# Patient Record
Sex: MALE | Race: WHITE | NOT HISPANIC OR LATINO | Employment: OTHER | URBAN - METROPOLITAN AREA
[De-identification: names, ages, dates, MRNs, and addresses within clinical notes are randomized per-mention and may not be internally consistent; named-entity substitution may affect disease eponyms.]

---

## 2017-02-03 DIAGNOSIS — Z00.00 MEDICARE ANNUAL WELLNESS VISIT, SUBSEQUENT: ICD-10-CM

## 2017-02-03 DIAGNOSIS — Z00.00 ENCOUNTER FOR ROUTINE ADULT HEALTH EXAMINATION WITHOUT ABNORMAL FINDINGS: ICD-10-CM

## 2017-02-03 DIAGNOSIS — Z12.11 SCREEN FOR COLON CANCER: ICD-10-CM

## 2017-02-05 LAB — HEMOCCULT STL QL IA: NEGATIVE

## 2017-02-05 PROCEDURE — 82274 ASSAY TEST FOR BLOOD FECAL: CPT | Performed by: FAMILY MEDICINE

## 2017-02-20 ENCOUNTER — DOCUMENTATION ONLY (OUTPATIENT)
Dept: VASCULAR SURGERY | Facility: CLINIC | Age: 72
End: 2017-02-20

## 2017-07-17 ENCOUNTER — OFFICE VISIT (OUTPATIENT)
Dept: FAMILY MEDICINE | Facility: CLINIC | Age: 72
End: 2017-07-17
Payer: COMMERCIAL

## 2017-07-17 VITALS
DIASTOLIC BLOOD PRESSURE: 66 MMHG | BODY MASS INDEX: 25.61 KG/M2 | WEIGHT: 169 LBS | HEART RATE: 62 BPM | HEIGHT: 68 IN | SYSTOLIC BLOOD PRESSURE: 128 MMHG | TEMPERATURE: 97.8 F | OXYGEN SATURATION: 94 %

## 2017-07-17 DIAGNOSIS — Z12.11 SCREEN FOR COLON CANCER: ICD-10-CM

## 2017-07-17 DIAGNOSIS — K58.9 IRRITABLE BOWEL SYNDROME WITHOUT DIARRHEA: ICD-10-CM

## 2017-07-17 DIAGNOSIS — M51.369 LUMBAR DEGENERATIVE DISC DISEASE: ICD-10-CM

## 2017-07-17 DIAGNOSIS — K20.0 EE (EOSINOPHILIC ESOPHAGITIS): ICD-10-CM

## 2017-07-17 DIAGNOSIS — Z00.00 ENCOUNTER FOR ROUTINE ADULT HEALTH EXAMINATION WITHOUT ABNORMAL FINDINGS: Primary | ICD-10-CM

## 2017-07-17 DIAGNOSIS — J33.9 NASAL POLYP: ICD-10-CM

## 2017-07-17 DIAGNOSIS — E78.5 HYPERLIPIDEMIA LDL GOAL <130: ICD-10-CM

## 2017-07-17 DIAGNOSIS — M47.816 LUMBAR FACET ARTHROPATHY: ICD-10-CM

## 2017-07-17 DIAGNOSIS — Z51.81 MEDICATION MONITORING ENCOUNTER: ICD-10-CM

## 2017-07-17 DIAGNOSIS — I10 HYPERTENSION GOAL BP (BLOOD PRESSURE) < 140/90: ICD-10-CM

## 2017-07-17 DIAGNOSIS — M48.061 SPINAL STENOSIS OF LUMBAR REGION AT MULTIPLE LEVELS: ICD-10-CM

## 2017-07-17 DIAGNOSIS — M43.16 SPONDYLOLISTHESIS OF LUMBAR REGION: ICD-10-CM

## 2017-07-17 DIAGNOSIS — G47.33 OBSTRUCTIVE SLEEP APNEA SYNDROME: ICD-10-CM

## 2017-07-17 DIAGNOSIS — Z12.5 SCREENING FOR PROSTATE CANCER: ICD-10-CM

## 2017-07-17 DIAGNOSIS — M48.061 LUMBAR FORAMINAL STENOSIS: ICD-10-CM

## 2017-07-17 DIAGNOSIS — J45.20 MILD INTERMITTENT ASTHMA WITHOUT COMPLICATION: ICD-10-CM

## 2017-07-17 LAB
ALBUMIN UR-MCNC: NEGATIVE MG/DL
APPEARANCE UR: CLEAR
BILIRUB UR QL STRIP: NEGATIVE
COLOR UR AUTO: YELLOW
ERYTHROCYTE [DISTWIDTH] IN BLOOD BY AUTOMATED COUNT: 12.6 % (ref 10–15)
GLUCOSE UR STRIP-MCNC: NEGATIVE MG/DL
HCT VFR BLD AUTO: 44 % (ref 40–53)
HGB BLD-MCNC: 15.3 G/DL (ref 13.3–17.7)
HGB UR QL STRIP: NEGATIVE
KETONES UR STRIP-MCNC: 15 MG/DL
LEUKOCYTE ESTERASE UR QL STRIP: NEGATIVE
MCH RBC QN AUTO: 33.3 PG (ref 26.5–33)
MCHC RBC AUTO-ENTMCNC: 34.8 G/DL (ref 31.5–36.5)
MCV RBC AUTO: 96 FL (ref 78–100)
NITRATE UR QL: NEGATIVE
PH UR STRIP: 5.5 PH (ref 5–7)
PLATELET # BLD AUTO: 277 10E9/L (ref 150–450)
RBC # BLD AUTO: 4.6 10E12/L (ref 4.4–5.9)
SP GR UR STRIP: 1.02 (ref 1–1.03)
URN SPEC COLLECT METH UR: ABNORMAL
UROBILINOGEN UR STRIP-ACNC: 0.2 EU/DL (ref 0.2–1)
WBC # BLD AUTO: 7.9 10E9/L (ref 4–11)

## 2017-07-17 PROCEDURE — 80061 LIPID PANEL: CPT | Performed by: FAMILY MEDICINE

## 2017-07-17 PROCEDURE — 81003 URINALYSIS AUTO W/O SCOPE: CPT | Performed by: FAMILY MEDICINE

## 2017-07-17 PROCEDURE — 82550 ASSAY OF CK (CPK): CPT | Performed by: FAMILY MEDICINE

## 2017-07-17 PROCEDURE — 36415 COLL VENOUS BLD VENIPUNCTURE: CPT | Performed by: FAMILY MEDICINE

## 2017-07-17 PROCEDURE — 82043 UR ALBUMIN QUANTITATIVE: CPT | Performed by: FAMILY MEDICINE

## 2017-07-17 PROCEDURE — 99397 PER PM REEVAL EST PAT 65+ YR: CPT | Performed by: FAMILY MEDICINE

## 2017-07-17 PROCEDURE — G0103 PSA SCREENING: HCPCS | Performed by: FAMILY MEDICINE

## 2017-07-17 PROCEDURE — 85027 COMPLETE CBC AUTOMATED: CPT | Performed by: FAMILY MEDICINE

## 2017-07-17 PROCEDURE — 80053 COMPREHEN METABOLIC PANEL: CPT | Performed by: FAMILY MEDICINE

## 2017-07-17 RX ORDER — LOSARTAN POTASSIUM AND HYDROCHLOROTHIAZIDE 12.5; 5 MG/1; MG/1
1 TABLET ORAL DAILY
Qty: 90 TABLET | Refills: 3 | Status: SHIPPED | OUTPATIENT
Start: 2017-07-17 | End: 2018-08-03

## 2017-07-17 RX ORDER — FLUTICASONE PROPIONATE 50 MCG
2 SPRAY, SUSPENSION (ML) NASAL DAILY
Qty: 48 G | Refills: 3 | Status: SHIPPED | OUTPATIENT
Start: 2017-07-17 | End: 2018-08-03

## 2017-07-17 RX ORDER — ALBUTEROL SULFATE 90 UG/1
2 AEROSOL, METERED RESPIRATORY (INHALATION) EVERY 6 HOURS PRN
Qty: 3 INHALER | Refills: 3 | Status: SHIPPED | OUTPATIENT
Start: 2017-07-17 | End: 2018-08-03

## 2017-07-17 NOTE — MR AVS SNAPSHOT
After Visit Summary   7/17/2017    Alexander Alfred    MRN: [id number]           Patient Information     Date Of Birth          1945        Visit Information        Provider Department      7/17/2017 1:40 PM Messi Saldana MD [facility]        Today's Diagnoses     Encounter for routine adult health examination without abnormal findings    -  1    Irritable bowel syndrome with diarrhea        Hypertension goal BP (blood pressure) < 140/90        Hyperlipidemia LDL goal <130        Obstructive sleep apnea syndrome        Mild intermittent asthma without complication        Nasal polyps        EE (eosinophilic esophagitis)        Spinal stenosis of lumbar region at multiple levels        Lumbar foraminal stenosis        Spondylolisthesis of lumbar region        Lumbar degenerative disc disease        Lumbar facet arthropathy        Screening for prostate cancer        Screen for colon cancer        Medication monitoring encounter          Care Instructions      [facility]                        To reach your care team during and after hours:   [phone]  To reach our pharmacy:        [phone]    Clinic Hours                        Our clinic hours are:    Monday   7:30 am to 7:00 pm                  Tuesday through Friday 7:30 am to 5:00 pm                             Saturday   8:00 am to 12:00 pm      Sunday   Closed      Pharmacy Hours                        Our pharmacy hours are:    Monday   8:30 am to 7:00 pm       Tuesday to Friday  8:30 am to 6:00 pm                       Saturday    9:00 am to 1:00 pm              Sunday    Closed              There is also information available at our web site:  [email or web address]    If your provider ordered any lab tests and you do not receive the results within 10 business days, please call the clinic.    If you need a medication refill please contact your pharmacy.  Please allow 2-3 business days for your refill to be  completed.    Our clinic offers telephone visits and e visits.  Please ask one of your team members to explain more.      Use MyChart (secure email communication and access to your chart) to send your primary care provider a message or make an appointment. Ask someone on your Team how to sign up for [employer].  Immunizations                      Immunization History   Administered Date(s) Administered     Influenza (High Dose) 3 valent vaccine 10/31/2011, 01/08/2013, 09/24/2014, 12/03/2015, 10/11/2016     Influenza (IIV3) 11/01/2013     Pneumococcal (PCV 13) 06/15/2015     Pneumococcal 23 valent 10/31/2011     TD (ADULT, 7+) 01/01/2004     TDAP Vaccine (Boostrix) 06/21/2012     Zoster vaccine, live 12/03/2015        Health Maintenance                         Health Maintenance Due   Topic Date Due     Asthma Control Test - every 6 months  02/22/2017           Services Typically covered by Medicare Recommended Completed   Vaccines    Pneumonoccol    Influenza    Hepatitis B (if medium/high risk)     Once for patients after age 65    Yearly  Medium/high risk factors:    End Stage Kidney Disease    Hemophiliacs who received Factor XIII or IX concentrates    Clients of institutions for developmentally disabled    Persons who live in same house as a Hepatitis B carrier    Homosexual men    Illicit injectable drug users    Health care workers     Mammogram Covered: One-time screen between age 35-39, annually for age 40+     Pap and Pelvic Exam Covered: Annually if  high risk,  or childbearing age with abnormal Pap in last 3 years.  Q24 months for all other women     Prostate Cancer Screening    Digital rectal exam    PSA Covered: Annually for all men > age 50     Corolrectal Cancer Screening Screening colonoscopy every 10 years, more often for high risk patients     Diabetes Self-Management Training Requires referral by treating physician for patient with diabetes     Diabetes Screening    Fasting blood sugar or glucose  tolerance test   Once yearly, twice yearly if prediabetic     Cardiovascular Screening Blood Tests    Total Cholesterol    HDL    Triglycerides Every 5 years     Medical Nutrition Therapy for Diabetes or Renal Disease Requires referral by treating physician for patient with diabetes or kidney disease     Glaucoma Screening Annually for patients with one of the following risk factors:    Diabetes Mellitus    Family history of Glaucoma    -American age 50 and over    -American age 65 and over     Bone Mass Measurement Every 24 months if one of the following risk factors:    Estrogen deficiency    Vertebral abnormalities on x-ray indicative of Osteoporosis, Osteopenia, or Vertebral fracture    Receiving/expected to receive the equivalent of at least 5 mg of Prednisone per day for > 3 months    Hyperparathyroidism    Patient being monitored for response to Osteoporosis Therapy     One-time AAA screen  Must be ordered as part of Medicare IPPE   Any patient with a family history of AAA    Males Age 65-75, with history of smoking at least 100 cigarettes in lifetime     Smoking Cessation Counseling Beneficiaries who use tobacco are eligible to receive 2 cessation attempts per year; each attempt includes maximum of 4 sessions     HIV Screening Annually for beneficiaries at increased risk:       Increased risk for HIV infection is defined in the  National Coverage Determinations (NCD) Manual,  Publication 100-03 Sections 190.14 (diagnostic) and 210.7 (screening). See http://www.cms.gov/manuals/downloads/kmg832r2_Ruth6.pdf and http://www.cms.gov/manuals/downloads/vdp254q2_Yjyy0.pdf on the Internet.  Three times per pregnancy for beneficiaries who are pregnant.     Future Annual Wellness Visit Annually, for all beneficiaries.       Preventive Health Recommendations:       Male Ages 65 and over    Yearly exam:             See your health care provider every year in order to  o   Review health changes.   o    Discuss preventive care.    o   Review your medicines if your doctor has prescribed any.    Talk with your health care provider about whether you should have a test to screen for prostate cancer (PSA).    Every 3 years, have a diabetes test (fasting glucose). If you are at risk for diabetes, you should have this test more often.    Every 5 years, have a cholesterol test. Have this test more often if you are at risk for high cholesterol or heart disease.     Every 10 years, have a colonoscopy. Or, have a yearly FIT test (stool test). These exams will check for colon cancer.    Talk to with your health care provider about screening for Abdominal Aortic Aneurysm if you have a family history of AAA or have a history of smoking.  Shots:     Get a flu shot each year.     Get a tetanus shot every 10 years.     Talk to your doctor about your pneumonia vaccines. There are now two you should receive - Pneumovax (PPSV 23) and Prevnar (PCV 13).    Talk to your doctor about a shingles vaccine.     Talk to your doctor about the hepatitis B vaccine.  Nutrition:     Eat at least 5 servings of fruits and vegetables each day.     Eat whole-grain bread, whole-wheat pasta and brown rice instead of white grains and rice.     Talk to your doctor about Calcium and Vitamin D.   Lifestyle    Exercise for at least 150 minutes a week (30 minutes a day, 5 days a week). This will help you control your weight and prevent disease.     Limit alcohol to one drink per day.     No smoking.     Wear sunscreen to prevent skin cancer.     See your dentist every six months for an exam and cleaning.     See your eye doctor every 1 to 2 years to screen for conditions such as glaucoma, macular degeneration and cataracts.          Follow-ups after your visit        Future tests that were ordered for you today     Open Future Orders        Priority Expected Expires Ordered    Fecal colorectal cancer screen (FIT) Routine 8/7/2017 10/9/2017 7/17/2017           "  Who to contact     If you have questions or need follow up information about today's clinic visit or your schedule please contact [facility] directly at [phone].  Normal or non-critical lab and imaging results will be communicated to you by MyChart, letter or phone within 4 business days after the clinic has received the results. If you do not hear from us within 7 days, please contact the clinic through [employer] or phone. If you have a critical or abnormal lab result, we will notify you by phone as soon as possible.  Submit refill requests through [employer] or call your pharmacy and they will forward the refill request to us. Please allow 3 business days for your refill to be completed.          Additional Information About Your Visit        [employer] Information     [employer] gives you secure access to your electronic health record. If you see a primary care provider, you can also send messages to your care team and make appointments. If you have questions, please call your primary care clinic.  If you do not have a primary care provider, please call [phone] and they will assist you.        Care EveryWhere ID     This is your Care EveryWhere ID. This could be used by other organizations to access your [place] medical records  [id number]        Your Vitals Were     Pulse Temperature Height Pulse Oximetry BMI (Body Mass Index)       62 97.8  F (36.6  C) (Oral) 5' 8\" (1.727 m) 94% 25.7 kg/m2        Blood Pressure from Last 3 Encounters:   07/17/17 128/66   10/11/16 130/58   08/26/16 164/82    Weight from Last 3 Encounters:   07/17/17 169 lb (76.7 kg)   10/11/16 179 lb 12.8 oz (81.6 kg)   08/22/16 177 lb (80.3 kg)              We Performed the Following     *UA reflex to Microscopic and Culture ([facility] [facility] (except [place] and [place])     Albumin Random Urine Quantitative     CBC with platelets     CK total     Comprehensive metabolic panel     Lipid panel reflex to direct " LDL     Prostate spec antigen screen          Where to get your medicines      These medications [facility] to [id number] - [place], [place] - [address] [facility]  [address] [place] [address]     Phone:  [phone]     albuterol 108 (90 BASE) MCG/ACT Inhaler    fluticasone 50 MCG/ACT spray    losartan-hydrochlorothiazide 50-12.5 MG per tablet          Primary Care Provider Office Phone # Fax #    Messi Saldana [phone]       [facility] [address] [address]        Equal Access to Services     JOSUE PARIKH : Hadii aad ku hadasho Soomaali, waaxda luqadaha, qaybta kaalmada adeegyada, summer cornejo. So [facility] [phone].    ATENCIÓN: Si habla español, tiene a yarbrough disposición servicios gratuitos de asistencia lingüística. Llame al [phone].    We comply with applicable federal civil rights laws and [place] laws. We do not discriminate on the basis of race, color, national origin, age, disability sex, sexual orientation or gender identity.            Thank you!     Thank you for choosing [facility]  for your care. Our goal is always to provide you with excellent care. Hearing back from our patients is one way we can continue to improve our services. Please take a few minutes to complete the written survey that you may receive in the mail after your visit with us. Thank you!             Your Updated Medication List - Protect others around you: Learn how to safely use, store and throw away your medicines at www.disposemymeds.org.          This list is accurate as of: 7/17/17  3:02 PM.  Always use your most recent med list.                   Brand Name Dispense Instructions for use Diagnosis    albuterol 108 (90 BASE) MCG/ACT Inhaler    PROAIR HFA/PROVENTIL HFA/VENTOLIN HFA    3 Inhaler    Inhale 2 puffs into the lungs every 6 hours as needed for shortness of breath / dyspnea or  wheezing    Mild intermittent asthma without complication       aspirin 81 MG chewable tablet     108 tablet    Take 1 tablet (81 mg) by mouth daily        CoQ-10 100 MG Caps      Take 100 mg by mouth daily        fluticasone 50 MCG/ACT spray    FLONASE    48 g    Spray 2 sprays into both nostrils daily    Nasal polyp, Obstructive sleep apnea syndrome       KRILL OIL PO      Take 1 capsule by mouth daily Taking MegaRed OTC. Strength unknown.        losartan-hydrochlorothiazide 50-12.5 MG per tablet    HYZAAR    90 tablet    Take 1 tablet by mouth daily    Hypertension goal BP (blood pressure) < 140/90       meclizine 25 MG tablet    ANTIVERT    30 tablet    Take 1 tablet (25 mg) by mouth every 6 hours as needed for dizziness    Vertigo       MULTIVITAMIN PO      Take 1 tablet by mouth daily

## 2017-07-17 NOTE — NURSING NOTE
"Chief Complaint   Patient presents with     Wellness Visit       Initial /70 (BP Location: Left arm, Patient Position: Chair, Cuff Size: Adult Regular)  Pulse 62  Temp 97.8  F (36.6  C) (Oral)  Ht 5' 8\" (1.727 m)  Wt 169 lb (76.7 kg)  SpO2 94%  BMI 25.7 kg/m2 Estimated body mass index is 25.7 kg/(m^2) as calculated from the following:    Height as of this encounter: 5' 8\" (1.727 m).    Weight as of this encounter: 169 lb (76.7 kg).  Medication Reconciliation: complete   Angela Brewer SMA        "

## 2017-07-17 NOTE — LETTER
[facility]  [address] [place] [address]                  [phone]   July 19, 2017    Alexander Alfred  [address]  [place] [address]      Dear Alexander,    Here is a summary of your recent test results:    Labs are overall quite good, except for increased CK (muscles).     We advise:     Balanced low cholesterol diet and regular exercise.   Recheck CK (labs) in the next next after 1 day with out a work out.     For additional lab test information, [email or web address] is an excellent reference.     Your test results are enclosed.      Please contact me if you have any questions.             Thank you very much for trusting [facility]..     Healthy regards,        Messi Saldana M.D.        Results for orders placed or performed in visit on 07/17/17   Comprehensive metabolic panel   Result Value Ref Range    Sodium 141 133 - 144 mmol/L    Potassium 4.3 3.4 - 5.3 mmol/L    Chloride 106 94 - 109 mmol/L    Carbon Dioxide 26 20 - 32 mmol/L    Anion Gap 9 3 - 14 mmol/L    Glucose 94 70 - 99 mg/dL    Urea Nitrogen 21 7 - 30 mg/dL    Creatinine 1.09 0.66 - 1.25 mg/dL    GFR Estimate 67 >60 mL/min/1.7m2    GFR Estimate If Black 81 >60 mL/min/1.7m2    Calcium 9.5 8.5 - 10.1 mg/dL    Bilirubin Total 0.6 0.2 - 1.3 mg/dL    Albumin 4.3 3.4 - 5.0 g/dL    Protein Total 7.3 6.8 - 8.8 g/dL    Alkaline Phosphatase 90 40 - 150 U/L    ALT 47 0 - 70 U/L    AST 36 0 - 45 U/L   Lipid panel reflex to direct LDL   Result Value Ref Range    Cholesterol 185 <200 mg/dL    Triglycerides 133 <150 mg/dL    HDL Cholesterol 66 >39 mg/dL    LDL Cholesterol Calculated 92 <100 mg/dL    Non HDL Cholesterol 119 <130 mg/dL   CK total   Result Value Ref Range    CK Total 535 (H) 30 - 300 U/L   CBC with platelets   Result Value Ref Range    WBC 7.9 4.0 - 11.0 10e9/L    RBC Count 4.60 4.4 - 5.9 10e12/L    Hemoglobin 15.3 13.3 - 17.7 g/dL    Hematocrit 44.0 40.0 - 53.0 %    MCV 96 78 - 100  fl    MCH 33.3 (H) 26.5 - 33.0 pg    MCHC 34.8 31.5 - 36.5 g/dL    RDW 12.6 10.0 - 15.0 %    Platelet Count 277 150 - 450 10e9/L   Prostate spec antigen screen   Result Value Ref Range    PSA 0.64 0 - 4 ug/L   Albumin Random Urine Quantitative   Result Value Ref Range    Creatinine Urine 153 mg/dL    Albumin Urine mg/L 18 mg/L    Albumin Urine mg/g Cr 11.76 0 - 17 mg/g Cr   *UA reflex to Microscopic and Culture ([facility] (except [place] and [place])   Result Value Ref Range    Color Urine Yellow     Appearance Urine Clear     Glucose Urine Negative NEG mg/dL    Bilirubin Urine Negative NEG    Ketones Urine 15 (A) NEG mg/dL    Specific Gravity Urine 1.025 1.003 - 1.035    Blood Urine Negative NEG    pH Urine 5.5 5.0 - 7.0 pH    Protein Albumin Urine Negative NEG mg/dL    Urobilinogen Urine 0.2 0.2 - 1.0 EU/dL    Nitrite Urine Negative NEG    Leukocyte Esterase Urine Negative NEG    Source Midstream Urine

## 2017-07-17 NOTE — PATIENT INSTRUCTIONS
[facility]                        To reach your care team during and after hours:   [phone]  To reach our pharmacy:        [phone]    Clinic Hours                        Our clinic hours are:    Monday   7:30 am to 7:00 pm                  Tuesday through Friday 7:30 am to 5:00 pm                             Saturday   8:00 am to 12:00 pm      Sunday   Closed      Pharmacy Hours                        Our pharmacy hours are:    Monday   8:30 am to 7:00 pm       Tuesday to Friday  8:30 am to 6:00 pm                       Saturday    9:00 am to 1:00 pm              Sunday    Closed              There is also information available at our web site:  [email or web address]    If your provider ordered any lab tests and you do not receive the results within 10 business days, please call the clinic.    If you need a medication refill please contact your pharmacy.  Please allow 2-3 business days for your refill to be completed.    Our clinic offers telephone visits and e visits.  Please ask one of your team members to explain more.      Use [employer] (secure email communication and access to your chart) to send your primary care provider a message or make an appointment. Ask someone on your Team how to sign up for [employer].  Immunizations                      Immunization History   Administered Date(s) Administered     Influenza (High Dose) 3 valent vaccine 10/31/2011, 01/08/2013, 09/24/2014, 12/03/2015, 10/11/2016     Influenza (IIV3) 11/01/2013     Pneumococcal (PCV 13) 06/15/2015     Pneumococcal 23 valent 10/31/2011     TD (ADULT, 7+) 01/01/2004     TDAP Vaccine (Boostrix) 06/21/2012     Zoster vaccine, live 12/03/2015        Health Maintenance                         Health Maintenance Due   Topic Date Due     Asthma Control Test - every 6 months  02/22/2017           Services Typically covered by Medicare Recommended Completed   Vaccines    Pneumonoccol    Influenza    Hepatitis B (if medium/high  risk)     Once for patients after age 65    Yearly  Medium/high risk factors:    End Stage Kidney Disease    Hemophiliacs who received Factor XIII or IX concentrates    Clients of institutions for developmentally disabled    Persons who live in same house as a Hepatitis B carrier    Homosexual men    Illicit injectable drug users    Health care workers     Mammogram Covered: One-time screen between age 35-39, annually for age 40+     Pap and Pelvic Exam Covered: Annually if  high risk,  or childbearing age with abnormal Pap in last 3 years.  Q24 months for all other women     Prostate Cancer Screening    Digital rectal exam    PSA Covered: Annually for all men > age 50     Corolrectal Cancer Screening Screening colonoscopy every 10 years, more often for high risk patients     Diabetes Self-Management Training Requires referral by treating physician for patient with diabetes     Diabetes Screening    Fasting blood sugar or glucose tolerance test   Once yearly, twice yearly if prediabetic     Cardiovascular Screening Blood Tests    Total Cholesterol    HDL    Triglycerides Every 5 years     Medical Nutrition Therapy for Diabetes or Renal Disease Requires referral by treating physician for patient with diabetes or kidney disease     Glaucoma Screening Annually for patients with one of the following risk factors:    Diabetes Mellitus    Family history of Glaucoma    -American age 50 and over    -American age 65 and over     Bone Mass Measurement Every 24 months if one of the following risk factors:    Estrogen deficiency    Vertebral abnormalities on x-ray indicative of Osteoporosis, Osteopenia, or Vertebral fracture    Receiving/expected to receive the equivalent of at least 5 mg of Prednisone per day for > 3 months    Hyperparathyroidism    Patient being monitored for response to Osteoporosis Therapy     One-time AAA screen  Must be ordered as part of Medicare IPPE   Any patient with a family history of  AAA    Males Age 65-75, with history of smoking at least 100 cigarettes in lifetime     Smoking Cessation Counseling Beneficiaries who use tobacco are eligible to receive 2 cessation attempts per year; each attempt includes maximum of 4 sessions     HIV Screening Annually for beneficiaries at increased risk:       Increased risk for HIV infection is defined in the  National Coverage Determinations (NCD) Manual,  Publication 100-03 Sections 190.14 (diagnostic) and 210.7 (screening). See http://www.cms.gov/manuals/downloads/mcj739r6_Xeob1.pdf and http://www.cms.gov/manuals/downloads/wrj919w8_Zfxp8.pdf on the Internet.  Three times per pregnancy for beneficiaries who are pregnant.     Future Annual Wellness Visit Annually, for all beneficiaries.       Preventive Health Recommendations:       Male Ages 65 and over    Yearly exam:             See your health care provider every year in order to  o   Review health changes.   o   Discuss preventive care.    o   Review your medicines if your doctor has prescribed any.    Talk with your health care provider about whether you should have a test to screen for prostate cancer (PSA).    Every 3 years, have a diabetes test (fasting glucose). If you are at risk for diabetes, you should have this test more often.    Every 5 years, have a cholesterol test. Have this test more often if you are at risk for high cholesterol or heart disease.     Every 10 years, have a colonoscopy. Or, have a yearly FIT test (stool test). These exams will check for colon cancer.    Talk to with your health care provider about screening for Abdominal Aortic Aneurysm if you have a family history of AAA or have a history of smoking.  Shots:     Get a flu shot each year.     Get a tetanus shot every 10 years.     Talk to your doctor about your pneumonia vaccines. There are now two you should receive - Pneumovax (PPSV 23) and Prevnar (PCV 13).    Talk to your doctor about a shingles vaccine.     Talk to your  doctor about the hepatitis B vaccine.  Nutrition:     Eat at least 5 servings of fruits and vegetables each day.     Eat whole-grain bread, whole-wheat pasta and brown rice instead of white grains and rice.     Talk to your doctor about Calcium and Vitamin D.   Lifestyle    Exercise for at least 150 minutes a week (30 minutes a day, 5 days a week). This will help you control your weight and prevent disease.     Limit alcohol to one drink per day.     No smoking.     Wear sunscreen to prevent skin cancer.     See your dentist every six months for an exam and cleaning.     See your eye doctor every 1 to 2 years to screen for conditions such as glaucoma, macular degeneration and cataracts.

## 2017-07-18 LAB
ALBUMIN SERPL-MCNC: 4.3 G/DL (ref 3.4–5)
ALP SERPL-CCNC: 90 U/L (ref 40–150)
ALT SERPL W P-5'-P-CCNC: 47 U/L (ref 0–70)
ANION GAP SERPL CALCULATED.3IONS-SCNC: 9 MMOL/L (ref 3–14)
AST SERPL W P-5'-P-CCNC: 36 U/L (ref 0–45)
BILIRUB SERPL-MCNC: 0.6 MG/DL (ref 0.2–1.3)
BUN SERPL-MCNC: 21 MG/DL (ref 7–30)
CALCIUM SERPL-MCNC: 9.5 MG/DL (ref 8.5–10.1)
CHLORIDE SERPL-SCNC: 106 MMOL/L (ref 94–109)
CHOLEST SERPL-MCNC: 185 MG/DL
CK SERPL-CCNC: 535 U/L (ref 30–300)
CO2 SERPL-SCNC: 26 MMOL/L (ref 20–32)
CREAT SERPL-MCNC: 1.09 MG/DL (ref 0.66–1.25)
CREAT UR-MCNC: 153 MG/DL
GFR SERPL CREATININE-BSD FRML MDRD: 67 ML/MIN/1.7M2
GLUCOSE SERPL-MCNC: 94 MG/DL (ref 70–99)
HDLC SERPL-MCNC: 66 MG/DL
LDLC SERPL CALC-MCNC: 92 MG/DL
MICROALBUMIN UR-MCNC: 18 MG/L
MICROALBUMIN/CREAT UR: 11.76 MG/G CR (ref 0–17)
NONHDLC SERPL-MCNC: 119 MG/DL
POTASSIUM SERPL-SCNC: 4.3 MMOL/L (ref 3.4–5.3)
PROT SERPL-MCNC: 7.3 G/DL (ref 6.8–8.8)
PSA SERPL-ACNC: 0.64 UG/L (ref 0–4)
SODIUM SERPL-SCNC: 141 MMOL/L (ref 133–144)
TRIGL SERPL-MCNC: 133 MG/DL

## 2017-07-18 ASSESSMENT — ASTHMA QUESTIONNAIRES: ACT_TOTALSCORE: 25

## 2017-11-09 ENCOUNTER — ALLIED HEALTH/NURSE VISIT (OUTPATIENT)
Dept: NURSING | Facility: CLINIC | Age: 72
End: 2017-11-09
Payer: COMMERCIAL

## 2017-11-09 DIAGNOSIS — Z23 NEED FOR PROPHYLACTIC VACCINATION AND INOCULATION AGAINST INFLUENZA: Primary | ICD-10-CM

## 2017-11-09 PROCEDURE — 90662 IIV NO PRSV INCREASED AG IM: CPT

## 2017-11-09 PROCEDURE — G0008 ADMIN INFLUENZA VIRUS VAC: HCPCS

## 2017-11-09 NOTE — PROGRESS NOTES

## 2017-11-09 NOTE — MR AVS SNAPSHOT
After Visit Summary   11/9/2017    Alexander Alfred    MRN: [id number]           Patient Information     Date Of Birth          1945        Visit Information        Provider Department      11/9/2017 8:30 AM CLAUDIA BURNETT/LPN [facility]        Today's Diagnoses     Need for prophylactic vaccination and inoculation against influenza    -  1       Follow-ups after your visit        Who to contact     If you have questions or need follow up information about today's clinic visit or your schedule please contact [facility] directly at [phone].  Normal or non-critical lab and imaging results will be communicated to you by VideoNot.eshart, letter or phone within 4 business days after the clinic has received the results. If you do not hear from us within 7 days, please contact the clinic through Ziptrt or phone. If you have a critical or abnormal lab result, we will notify you by phone as soon as possible.  Submit refill requests through Career[employer] or call your pharmacy and they will forward the refill request to us. Please allow 3 business days for your refill to be completed.          Additional Information About Your Visit        MyChart Information     [employer] gives you secure access to your electronic health record. If you see a primary care provider, you can also send messages to your care team and make appointments. If you have questions, please call your primary care clinic.  If you do not have a primary care provider, please call [phone] and they will assist you.        Care EveryWhere ID     This is your Care EveryWhere ID. This could be used by other organizations to access your [place] medical records  [id number]         Blood Pressure from Last 3 Encounters:   07/17/17 128/66   10/11/16 130/58   08/26/16 164/82    Weight from Last 3 Encounters:   07/17/17 169 lb (76.7 kg)   10/11/16 179 lb 12.8 oz (81.6 kg)   08/22/16 177 lb (80.3 kg)              We Performed the  Following     ADMIN INFLUENZA (For MEDICARE Patients ONLY) []     FLU VACCINE, INCREASED ANTIGEN, PRESV FREE, AGE 65+ [02299]     Vaccine Administration, Initial [61871]        Primary Care Provider Office Phone # Fax #    Messi Saldana [phone]       [address] [facility] [address]        Equal Access to Services     [facility] : Hadii aad ku hadasho Soomaali, waaxda luqadaha, qaybta kaalmada adeegyada, waxay idiin hayaan adeeg khdavidmeir lanaman . So [facility] [phone].    ATENCIÓN: Si habla español, tiene a yarbrough disposición servicios gratuitos de asistencia lingüística. Priscillaame al [phone].    We comply with applicable federal civil rights laws and [place] laws. We do not discriminate on the basis of race, color, national origin, age, disability, sex, sexual orientation, or gender identity.            Thank you!     Thank you for choosing [facility]  for your care. Our goal is always to provide you with excellent care. Hearing back from our patients is one way we can continue to improve our services. Please take a few minutes to complete the written survey that you may receive in the mail after your visit with us. Thank you!             Your Updated Medication List - Protect others around you: Learn how to safely use, store and throw away your medicines at www.disposemymeds.org.          This list is accurate as of: 11/9/17  9:36 AM.  Always use your most recent med list.                   Brand Name Dispense Instructions for use Diagnosis    albuterol 108 (90 BASE) MCG/ACT Inhaler    PROAIR HFA/PROVENTIL HFA/VENTOLIN HFA    3 Inhaler    Inhale 2 puffs into the lungs every 6 hours as needed for shortness of breath / dyspnea or wheezing    Mild intermittent asthma without complication       aspirin 81 MG chewable tablet     108 tablet    Take 1 tablet (81 mg) by mouth daily        CoQ-10 100 MG Caps      Take 100 mg by mouth daily        fluticasone 50 MCG/ACT  spray    FLONASE    48 g    Spray 2 sprays into both nostrils daily    Nasal polyp, Obstructive sleep apnea syndrome       KRILL OIL PO      Take 1 capsule by mouth daily Taking MegaRed OTC. Strength unknown.        losartan-hydrochlorothiazide 50-12.5 MG per tablet    HYZAAR    90 tablet    Take 1 tablet by mouth daily    Hypertension goal BP (blood pressure) < 140/90       meclizine 25 MG tablet    ANTIVERT    30 tablet    Take 1 tablet (25 mg) by mouth every 6 hours as needed for dizziness    Vertigo       MULTIVITAMIN PO      Take 1 tablet by mouth daily

## 2017-12-06 ENCOUNTER — TRANSFERRED RECORDS (OUTPATIENT)
Dept: HEALTH INFORMATION MANAGEMENT | Facility: CLINIC | Age: 72
End: 2017-12-06

## 2017-12-12 ENCOUNTER — TELEPHONE (OUTPATIENT)
Dept: FAMILY MEDICINE | Facility: CLINIC | Age: 72
End: 2017-12-12

## 2017-12-12 DIAGNOSIS — G47.30 SLEEP APNEA: Primary | ICD-10-CM

## 2017-12-12 NOTE — TELEPHONE ENCOUNTER
Reason for Call:  Other DME CPAP mask    Detailed comments: Pt walked in and wanted to leave a note for Dr. Saldana for a new CPAP mask, Pt would like it sent to [facility]   [phone] Fax [phone]    Phone Number Patient can be reached at: Home number on file [phone] (home)    Best Time:     Can we leave a detailed message on this number? YES    Call taken on 12/12/2017 at 4:13 PM by Usha Albarran

## 2017-12-13 NOTE — TELEPHONE ENCOUNTER
DME for CPAP ordered.  Put in North basket for TS signature.    Radha Olguin, BELINDA, RN, N  [facility]) [phone]

## 2018-08-03 ENCOUNTER — OFFICE VISIT (OUTPATIENT)
Dept: FAMILY MEDICINE | Facility: CLINIC | Age: 73
End: 2018-08-03
Payer: COMMERCIAL

## 2018-08-03 VITALS
HEIGHT: 68 IN | DIASTOLIC BLOOD PRESSURE: 72 MMHG | TEMPERATURE: 97.6 F | OXYGEN SATURATION: 97 % | HEART RATE: 53 BPM | SYSTOLIC BLOOD PRESSURE: 148 MMHG | BODY MASS INDEX: 27.43 KG/M2 | WEIGHT: 181 LBS

## 2018-08-03 DIAGNOSIS — M48.061 SPINAL STENOSIS OF LUMBAR REGION AT MULTIPLE LEVELS: ICD-10-CM

## 2018-08-03 DIAGNOSIS — M48.061 LUMBAR FORAMINAL STENOSIS: ICD-10-CM

## 2018-08-03 DIAGNOSIS — J33.9 NASAL POLYP: ICD-10-CM

## 2018-08-03 DIAGNOSIS — Z51.81 MEDICATION MONITORING ENCOUNTER: ICD-10-CM

## 2018-08-03 DIAGNOSIS — E78.5 HYPERLIPIDEMIA LDL GOAL <130: ICD-10-CM

## 2018-08-03 DIAGNOSIS — M51.369 LUMBAR DEGENERATIVE DISC DISEASE: ICD-10-CM

## 2018-08-03 DIAGNOSIS — Z12.11 SCREEN FOR COLON CANCER: ICD-10-CM

## 2018-08-03 DIAGNOSIS — Z00.00 ENCOUNTER FOR ROUTINE ADULT HEALTH EXAMINATION WITHOUT ABNORMAL FINDINGS: Primary | ICD-10-CM

## 2018-08-03 DIAGNOSIS — K20.0 EE (EOSINOPHILIC ESOPHAGITIS): ICD-10-CM

## 2018-08-03 DIAGNOSIS — I10 HYPERTENSION GOAL BP (BLOOD PRESSURE) < 140/90: ICD-10-CM

## 2018-08-03 DIAGNOSIS — G47.33 OBSTRUCTIVE SLEEP APNEA SYNDROME: ICD-10-CM

## 2018-08-03 DIAGNOSIS — J45.20 MILD INTERMITTENT ASTHMA WITHOUT COMPLICATION: ICD-10-CM

## 2018-08-03 DIAGNOSIS — M43.10 ACQUIRED SPONDYLOLISTHESIS: ICD-10-CM

## 2018-08-03 DIAGNOSIS — Z12.5 SCREENING FOR PROSTATE CANCER: ICD-10-CM

## 2018-08-03 LAB
ALBUMIN SERPL-MCNC: 4.2 G/DL (ref 3.4–5)
ALBUMIN UR-MCNC: NEGATIVE MG/DL
ALP SERPL-CCNC: 76 U/L (ref 40–150)
ALT SERPL W P-5'-P-CCNC: 55 U/L (ref 0–70)
ANION GAP SERPL CALCULATED.3IONS-SCNC: 7 MMOL/L (ref 3–14)
APPEARANCE UR: CLEAR
AST SERPL W P-5'-P-CCNC: 38 U/L (ref 0–45)
BILIRUB SERPL-MCNC: 0.6 MG/DL (ref 0.2–1.3)
BILIRUB UR QL STRIP: NEGATIVE
BUN SERPL-MCNC: 19 MG/DL (ref 7–30)
CALCIUM SERPL-MCNC: 8.8 MG/DL (ref 8.5–10.1)
CHLORIDE SERPL-SCNC: 107 MMOL/L (ref 94–109)
CHOLEST SERPL-MCNC: 174 MG/DL
CK SERPL-CCNC: 551 U/L (ref 30–300)
CO2 SERPL-SCNC: 28 MMOL/L (ref 20–32)
COLOR UR AUTO: YELLOW
CREAT SERPL-MCNC: 1.05 MG/DL (ref 0.66–1.25)
CREAT UR-MCNC: 94 MG/DL
ERYTHROCYTE [DISTWIDTH] IN BLOOD BY AUTOMATED COUNT: 13 % (ref 10–15)
GFR SERPL CREATININE-BSD FRML MDRD: 69 ML/MIN/1.7M2
GLUCOSE SERPL-MCNC: 95 MG/DL (ref 70–99)
GLUCOSE UR STRIP-MCNC: NEGATIVE MG/DL
HCT VFR BLD AUTO: 45.7 % (ref 40–53)
HDLC SERPL-MCNC: 52 MG/DL
HGB BLD-MCNC: 15 G/DL (ref 13.3–17.7)
HGB UR QL STRIP: NEGATIVE
KETONES UR STRIP-MCNC: NEGATIVE MG/DL
LDLC SERPL CALC-MCNC: 89 MG/DL
LEUKOCYTE ESTERASE UR QL STRIP: NEGATIVE
MCH RBC QN AUTO: 32.3 PG (ref 26.5–33)
MCHC RBC AUTO-ENTMCNC: 32.8 G/DL (ref 31.5–36.5)
MCV RBC AUTO: 98 FL (ref 78–100)
MICROALBUMIN UR-MCNC: 5 MG/L
MICROALBUMIN/CREAT UR: 5.83 MG/G CR (ref 0–17)
NITRATE UR QL: NEGATIVE
NONHDLC SERPL-MCNC: 122 MG/DL
PH UR STRIP: 6 PH (ref 5–7)
PLATELET # BLD AUTO: 253 10E9/L (ref 150–450)
POTASSIUM SERPL-SCNC: 3.9 MMOL/L (ref 3.4–5.3)
PROT SERPL-MCNC: 7.5 G/DL (ref 6.8–8.8)
PSA SERPL-ACNC: 1.18 UG/L (ref 0–4)
RBC # BLD AUTO: 4.65 10E12/L (ref 4.4–5.9)
SODIUM SERPL-SCNC: 142 MMOL/L (ref 133–144)
SOURCE: NORMAL
SP GR UR STRIP: 1.02 (ref 1–1.03)
TRIGL SERPL-MCNC: 166 MG/DL
TSH SERPL DL<=0.005 MIU/L-ACNC: 1.78 MU/L (ref 0.4–4)
UROBILINOGEN UR STRIP-ACNC: 0.2 EU/DL (ref 0.2–1)
WBC # BLD AUTO: 6.6 10E9/L (ref 4–11)

## 2018-08-03 PROCEDURE — 80053 COMPREHEN METABOLIC PANEL: CPT | Performed by: FAMILY MEDICINE

## 2018-08-03 PROCEDURE — 99397 PER PM REEVAL EST PAT 65+ YR: CPT | Performed by: FAMILY MEDICINE

## 2018-08-03 PROCEDURE — 82043 UR ALBUMIN QUANTITATIVE: CPT | Performed by: FAMILY MEDICINE

## 2018-08-03 PROCEDURE — 80061 LIPID PANEL: CPT | Performed by: FAMILY MEDICINE

## 2018-08-03 PROCEDURE — 82550 ASSAY OF CK (CPK): CPT | Performed by: FAMILY MEDICINE

## 2018-08-03 PROCEDURE — 84443 ASSAY THYROID STIM HORMONE: CPT | Performed by: FAMILY MEDICINE

## 2018-08-03 PROCEDURE — G0103 PSA SCREENING: HCPCS | Performed by: FAMILY MEDICINE

## 2018-08-03 PROCEDURE — 81003 URINALYSIS AUTO W/O SCOPE: CPT | Performed by: FAMILY MEDICINE

## 2018-08-03 PROCEDURE — 85027 COMPLETE CBC AUTOMATED: CPT | Performed by: FAMILY MEDICINE

## 2018-08-03 PROCEDURE — 36415 COLL VENOUS BLD VENIPUNCTURE: CPT | Performed by: FAMILY MEDICINE

## 2018-08-03 RX ORDER — TEA TREE OIL 100 %
OIL (ML) TOPICAL
COMMUNITY
Start: 2018-08-03 | End: 2019-08-08

## 2018-08-03 RX ORDER — LOSARTAN POTASSIUM AND HYDROCHLOROTHIAZIDE 12.5; 5 MG/1; MG/1
1 TABLET ORAL DAILY
Qty: 90 TABLET | Refills: 3 | Status: SHIPPED | OUTPATIENT
Start: 2018-08-03 | End: 2019-01-07

## 2018-08-03 RX ORDER — ALBUTEROL SULFATE 90 UG/1
2 AEROSOL, METERED RESPIRATORY (INHALATION) EVERY 6 HOURS PRN
Qty: 3 INHALER | Refills: 3 | Status: SHIPPED | OUTPATIENT
Start: 2018-08-03 | End: 2019-08-08

## 2018-08-03 RX ORDER — CHOLECALCIFEROL (VITAMIN D3) 50 MCG
CAPSULE ORAL
COMMUNITY
Start: 2018-08-03 | End: 2023-08-30

## 2018-08-03 RX ORDER — FLUTICASONE PROPIONATE 50 MCG
2 SPRAY, SUSPENSION (ML) NASAL DAILY
Qty: 48 G | Refills: 3 | Status: SHIPPED | OUTPATIENT
Start: 2018-08-03 | End: 2019-01-07

## 2018-08-03 NOTE — PROGRESS NOTES
SUBJECTIVE:   Alexander Alfred is a 73 year old male who presents for Preventive Visit.    Are you in the first 12 months of your Medicare Part B coverage?  No    Healthy Habits:    Do you get at least three servings of calcium containing foods daily (dairy, green leafy vegetables, etc.)? yes    Amount of exercise or daily activities, outside of work: 3 day(s) per week    Problems taking medications regularly No    Medication side effects: No    Have you had an eye exam in the past two years? yes    Do you see a dentist twice per year? yes    Do you have sleep apnea, excessive snoring or daytime drowsiness?uses CPAP      Ability to successfully perform activities of daily living: Yes, no assistance needed    Home safety:  none identified     Hearing impairment: Yes, tinitis in Rt ear    Fall risk:    Fallen 2 or more times in the past year?: No  Any fall with injury in the past year?: No    COGNITIVE SCREEN  1) Repeat 3 items (Leader, Season, Table)    2) Clock draw: NORMAL  3) 3 item recall: Recalls 1 object   Results: NORMAL clock, 1-2 items recalled: COGNITIVE IMPAIRMENT LESS LIKELY    Mini-CogTM Copyright S Carlie. Licensed by the author for use in [facility]; reprinted with permission (jean paul@[facility]). All rights reserved.      Reviewed and updated as needed this visit by clinical staff  Tobacco  Allergies  Meds  Med Hx  Surg Hx  Fam Hx  Soc Hx        Reviewed and updated as needed this visit by Provider        Social History   Substance Use Topics     Smoking status: Never Smoker     Smokeless tobacco: Never Used     Alcohol use 7.0 oz/week     14 Standard drinks or equivalent per week      Comment: 2 drinks per day avg       If you drink alcohol do you typically have >3 drinks per day or >7 drinks per week? No                        Today's PHQ-2 Score:   PHQ-2 ( 1999 Pfizer) 8/3/2018 7/17/2017   Q1: Little interest or pleasure in doing things 0 0   Q2: Feeling down, depressed or hopeless 0 0    PHQ-2 Score 0 0     Asthma    No issues, albuterol 3-4 times, ACT = 25    EE    No Issues    Htn    BP Readings from Last 3 Encounters:   08/03/18 148/72   07/17/17 128/66   10/11/16 130/58     Lab Results   Component Value Date    CR 1.09 07/17/2017     Lipids    Recent Labs   Lab Test  07/17/17   1436  08/22/16   0939  06/15/15   1002  06/26/13   1041   CHOL  185  177  191  176   HDL  66  56  58  52   LDL  92  84  105  94   TRIG  133  185*  140  150   CHOLHDLRATIO   --    --   3.3  3.4     LBP - Dr Caraballo    No issues, 2 ibuprofen daily controls well, watching scoliosis, at 11 degrees    Sleep Apnea    Using CPAP, no issues    Nasal polyps    Controlled with flonase    Do you feel safe in your environment - Yes    Do you have a Health Care Directive?: Yes: Advance Directive has been received and scanned.    Current providers sharing in care for this patient include:   Patient Care Team:  Messi Saldana MD as PCP - General    The following health maintenance items are reviewed in Epic and correct as of today:  Health Maintenance   Topic Date Due     ASTHMA ACTION PLAN Q1 YR  08/22/2017     ASTHMA CONTROL TEST Q6 MOS  01/17/2018     FIT Q1 YR  02/05/2018     BMP Q1 YR  07/17/2018     LIPID MONITORING Q1 YEAR  07/17/2018     MICROALBUMIN Q1 YEAR  07/17/2018     PSA Q1 YR  07/17/2018     WELLNESS VISIT Q1 YR  07/17/2018     PHQ-2 Q1 YR  07/17/2018     FALL RISK ASSESSMENT  07/17/2018     INFLUENZA VACCINE (1) 09/01/2018     COLONOSCOPY Q10 YR  01/01/2020     TETANUS Q10 YR  06/21/2022     ADVANCE DIRECTIVE PLANNING Q5 YRS  08/03/2023     PNEUMOCOCCAL  Completed     AORTIC ANEURYSM SCREENING (SYSTEM ASSIGNED)  Completed     HEPATITIS C SCREENING  Completed     Health Maintenance     Colonoscopy:  Due 2020?   FIT:  given              PSA:  done   DEXA:  NA    Health Maintenance Due   Topic Date Due     ASTHMA ACTION PLAN Q1 YR  08/22/2017     ASTHMA CONTROL TEST Q6 MOS  01/17/2018     FIT Q1 YR  02/05/2018     BMP Q1  YR  07/17/2018     LIPID MONITORING Q1 YEAR  07/17/2018     MICROALBUMIN Q1 YEAR  07/17/2018     PSA Q1 YR  07/17/2018     WELLNESS VISIT Q1 YR  07/17/2018     PHQ-2 Q1 YR 07/17/2018     FALL RISK ASSESSMENT  07/17/2018       Current Problem List    Patient Active Problem List   Diagnosis     Nasal polyps     Asthma, mild intermittent     Sleep apnea     Hyperlipidemia LDL goal <130     Hypertension goal BP (blood pressure) < 140/90     EE (eosinophilic esophagitis)     Osteoarthritis of left hip     Lumbar facet arthropathy     Lumbar degenerative disc disease     Lumbar pain     Left lumbosacral radiculopathy     Spondylolisthesis of lumbar region     Lumbar foraminal stenosis     Central spinal stenosis     Spinal stenosis of lumbar region at multiple levels     Acquired spondylolisthesis     Closed traumatic nondisplaced fracture of distal end of left fibula     Left ankle pain     Ankle fracture     Advanced directives, counseling/discussion       Past Medical History    Past Medical History:   Diagnosis Date     Asthma, mild intermittent     [place] asthma     EE (eosinophilic esophagitis) 8/13    Dr Ramirez     Hyperlipidemia LDL goal <130      Hypertension goal BP (blood pressure) < 140/90      Nasal polyps 2004    approx 2/year - increased sx from nasal polyps - prednisone 40 mg x 2 days, 20 mg x 2 days, 10mg x 10 days with excellent results     Other chronic pain      Sleep apnea 2006    moderate - CPAP       Past Surgical History    Past Surgical History:   Procedure Laterality Date     COLONOSCOPY  2010    normal - due 2020     DECOMPRESSION, FUSION LUMBAR POSTERIOR THREE + LEVELS, COMBINED N/A 10/7/2014    Dr Knight - COMBINED DECOMPRESSION, FUSION LUMBAR POSTERIOR THREE + LEVELS     ESOPHAGOSCOPY, GASTROSCOPY, DUODENOSCOPY (EGD), COMBINED  8/7/2013    Dr Ramirez - EE, non erosive gastropathy     ROTATOR CUFF REPAIR RT/LT Left 2005    Lt shoulder - dr armaan lazar     ROTATOR CUFF REPAIR RT/LT  Left 05/2009    Lt shoulder - dr armaan lazar     stress echo  7/11    normal     STRESS ECHO ([facility])  6/15    normal stress echo     SURGICAL HISTORY OF -   1982    Lt ankle CRIF     SURGICAL HISTORY OF -   4/08    blephoplasty     SURGICAL HISTORY OF -   11/16    T12-L1 - Decompression - Dr Knight -      TONSILLECTOMY  1950       Current Medications    Current Outpatient Prescriptions   Medication Sig Dispense Refill     albuterol (PROAIR HFA/PROVENTIL HFA/VENTOLIN HFA) 108 (90 Base) MCG/ACT Inhaler Inhale 2 puffs into the lungs every 6 hours as needed for shortness of breath / dyspnea or wheezing 3 Inhaler 3     aspirin 81 MG chewable tablet Take 1 tablet (81 mg) by mouth daily 108 tablet 3     Bacillus Coagulans-Inulin (PROBIOTIC-PREBIOTIC) 1-250 BILLION-MG CAPS        Coenzyme Q10 (COQ-10) 100 MG CAPS Take 100 mg by mouth daily       fluticasone (FLONASE) 50 MCG/ACT spray Spray 2 sprays into both nostrils daily 48 g 3     KRILL OIL PO Take 1 capsule by mouth daily Taking MegaRed OTC. Strength unknown.       losartan-hydrochlorothiazide (HYZAAR) 50-12.5 MG per tablet Take 1 tablet by mouth daily 90 tablet 3     meclizine (ANTIVERT) 25 MG tablet Take 1 tablet (25 mg) by mouth every 6 hours as needed for dizziness 30 tablet 1     Multiple Vitamin (MULTIVITAMIN OR) Take 1 tablet by mouth daily        order for DME Equipment being ordered: CPAP mask 1 Device 0     Probiotic Product (4X PROBIOTIC) TABS        [DISCONTINUED] albuterol (PROAIR HFA/PROVENTIL HFA/VENTOLIN HFA) 108 (90 BASE) MCG/ACT Inhaler Inhale 2 puffs into the lungs every 6 hours as needed for shortness of breath / dyspnea or wheezing 3 Inhaler 3     [DISCONTINUED] losartan-hydrochlorothiazide (HYZAAR) 50-12.5 MG per tablet Take 1 tablet by mouth daily 90 tablet 3       Allergies    Allergies   Allergen Reactions     Codeine GI Disturbance     Hydrocodone GI Disturbance     Oxycodone GI Disturbance       Immunizations    Immunization History    Administered Date(s) Administered     Influenza (High Dose) 3 valent vaccine 10/31/2011, 01/08/2013, 09/24/2014, 12/03/2015, 10/11/2016, 11/09/2017     Influenza (IIV3) PF 11/01/2013     Pneumo Conj 13-V (2010&after) 06/15/2015     Pneumococcal 23 valent 10/31/2011     TD (ADULT, 7+) 01/01/2004     TDAP Vaccine (Boostrix) 06/21/2012     Zoster vaccine, live 12/03/2015       Family History    Family History   Problem Relation Age of Onset     HEART DISEASE Father      Hypertension Father      C.A.D. Father      smoker     C.A.ZOEY. Other      cousin - at 45     HEART DISEASE Maternal Grandfather      Hypertension Maternal Grandfather      C.A.ZOEY. Maternal Grandfather      Coronary Artery Disease Maternal Uncle      Coronary Artery Disease Maternal Uncle        Social History    Social History     Social History     Marital status:      Spouse name: Valeria     Number of children: 3     Years of education: 16     Occupational History      [employer]     Social History Main Topics     Smoking status: Never Smoker     Smokeless tobacco: Never Used     Alcohol use 7.0 oz/week     14 Standard drinks or equivalent per week      Comment: 2 drinks per day avg     Drug use: No     Sexual activity: Yes     Partners: Female     Birth control/ protection: Rhythm, Male Surgical     Other Topics Concern     Parent/Sibling W/ Cabg, Mi Or Angioplasty Before 65f 55m? 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, landscaping, Mtn Bike riding     Seat Belt Yes     Social History Narrative       ROS:  CONSTITUTIONAL: NEGATIVE for fever, chills, change in weight  INTEGUMENTARY/SKIN: NEGATIVE for worrisome rashes, moles or lesions  EYES: NEGATIVE for vision changes or irritation  ENT/MOUTH: NEGATIVE for ear, mouth and throat problems  RESP: NEGATIVE for significant cough or SOB  CV: NEGATIVE for chest pain, palpitations or peripheral edema  GI: NEGATIVE for nausea, abdominal pain, heartburn, or change in bowel  "habits  : NEGATIVE for frequency, dysuria, or hematuria  MUSCULOSKELETAL: NEGATIVE for significant arthralgias or myalgia  NEURO: NEGATIVE for weakness, dizziness or paresthesias  ENDOCRINE: NEGATIVE for temperature intolerance, skin/hair changes  HEME: NEGATIVE for bleeding problems  PSYCHIATRIC: NEGATIVE for changes in mood or affect    OBJECTIVE:   /72  Pulse 53  Temp 97.6  F (36.4  C) (Oral)  Ht 5' 8\" (1.727 m)  Wt 181 lb (82.1 kg)  SpO2 97%  BMI 27.52 kg/m2 Estimated body mass index is 27.52 kg/(m^2) as calculated from the following:    Height as of this encounter: 5' 8\" (1.727 m).    Weight as of this encounter: 181 lb (82.1 kg).  EXAM:   GENERAL: healthy, alert and no distress  EYES: Eyes grossly normal to inspection, PERRL and conjunctivae and sclerae normal  HENT: ear canals and TM's normal, nose and mouth without ulcers or lesions  NECK: no adenopathy, no asymmetry, masses, or scars and thyroid normal to palpation  RESP: lungs clear to auscultation - no rales, rhonchi or wheezes  CV: regular rate and rhythm, normal S1 S2, no S3 or S4, no murmur, click or rub, no peripheral edema and peripheral pulses strong  ABDOMEN: soft, nontender, no hepatosplenomegaly, no masses and bowel sounds normal   (male): normal male genitalia without lesions or urethral discharge, no hernia  RECTAL: normal sphincter tone, no rectal masses, prostate normal size, smooth, nontender without nodules or masses  MS: no gross musculoskeletal defects noted, no edema  SKIN: no suspicious lesions or rashes  NEURO: Normal strength and tone, mentation intact and speech normal  PSYCH: mentation appears normal, affect normal/bright  LYMPH: no cervical, supraclavicular, axillary, or inguinal adenopathy    Diagnostic Test Results:    Labs pending    ASSESSMENT / PLAN:       ICD-10-CM    1. Encounter for routine adult health examination without abnormal findings Z00.00 Comprehensive metabolic panel     Lipid panel reflex to " direct LDL Fasting     CK total     CBC with platelets     TSH with free T4 reflex     Prostate spec antigen screen     Albumin Random Urine Quantitative with Creat Ratio     *UA reflex to Microscopic and Culture ([facility] (except [place] and [place])     Fecal colorectal cancer screen (FIT)   2. Hypertension goal BP (blood pressure) < 140/90 I10 Comprehensive metabolic panel     Albumin Random Urine Quantitative with Creat Ratio     *UA reflex to Microscopic and Culture ([facility] (except [place] and [place])     losartan-hydrochlorothiazide (HYZAAR) 50-12.5 MG per tablet   3. Hyperlipidemia LDL goal <130 E78.5 Comprehensive metabolic panel     Lipid panel reflex to direct LDL Fasting     CK total   4. Obstructive sleep apnea syndrome G47.33 TSH with free T4 reflex     fluticasone (FLONASE) 50 MCG/ACT spray   5. Mild intermittent asthma without complication J45.20 albuterol (PROAIR HFA/PROVENTIL HFA/VENTOLIN HFA) 108 (90 Base) MCG/ACT Inhaler   6. Nasal polyps J33.9 fluticasone (FLONASE) 50 MCG/ACT spray   7. EE (eosinophilic esophagitis) K20.0 Comprehensive metabolic panel     CBC with platelets   8. Lumbar degenerative disc disease M51.36    9. Lumbar foraminal stenosis M99.83    10. Spinal stenosis of lumbar region at multiple levels M48.061    11. Acquired spondylolisthesis M43.10    12. Screen for colon cancer Z12.11 Fecal colorectal cancer screen (FIT)   13. Screening for prostate cancer Z12.5 Prostate spec antigen screen   14. Medication monitoring encounter Z51.81 Comprehensive metabolic panel     Lipid panel reflex to direct LDL Fasting     CK total     CBC with platelets     TSH with free T4 reflex     Albumin Random Urine Quantitative with Creat Ratio     *UA reflex to Microscopic and Culture ([facility] (except [place] and [place])     Discussed treatment/modality options, including risk and benefits, he desires advised alcohol consumption  "1oz per day or less, advised aspirin 81 mg po daily, advised 1 multivitamin per day, advised calcium 9136-8700 mg/d and Vitamin D 800-1200 IU/d, advised dentist every 6 months, advised diet and exercise, advised opthalmologist every 1-2 years, advised self testicular exam q month, further health care maintenance, further lab(s) and observation. All diagnosis above reviewed and noted above, otherwise stable.  See [facility] orders for further details.      Return in about 1 year (around 8/3/2019), or if symptoms worsen or fail to improve, for Physical Exam.    Health Maintenance Due   Topic Date Due     ASTHMA ACTION PLAN Q1 YR  08/22/2017     ASTHMA CONTROL TEST Q6 MOS  01/17/2018     FIT Q1 YR  02/05/2018     BMP Q1 YR  07/17/2018     LIPID MONITORING Q1 YEAR  07/17/2018     MICROALBUMIN Q1 YEAR  07/17/2018     PSA Q1 YR  07/17/2018     WELLNESS VISIT Q1 YR  07/17/2018     PHQ-2 Q1 YR  07/17/2018     FALL RISK ASSESSMENT  07/17/2018       End of Life Planning:  Patient currently has an advanced directive: pending    COUNSELING:  Reviewed preventive health counseling, as reflected in patient instructions       Regular exercise       Healthy diet/nutrition       Vision screening       Hearing screening       Dental care       Colon cancer screening       Prostate cancer screening       Osteoporosis Prevention/Bone Health    BP Readings from Last 1 Encounters:   08/03/18 148/72     Estimated body mass index is 27.52 kg/(m^2) as calculated from the following:    Height as of this encounter: 5' 8\" (1.727 m).    Weight as of this encounter: 181 lb (82.1 kg).           reports that he has never smoked. He has never used smokeless tobacco.      Appropriate preventive services were discussed with this patient, including applicable screening as appropriate for cardiovascular disease, diabetes, osteopenia/osteoporosis, and glaucoma.  As appropriate for age/gender, discussed screening for colorectal cancer, prostate cancer, " breast cancer, and cervical cancer. Checklist reviewing preventive services available has been given to the patient.    Reviewed patients plan of care and provided an AVS. The Intermediate Care Plan ( asthma action plan, low back pain action plan, and migraine action plan) for Alexander meets the Care Plan requirement. This Care Plan has been established and reviewed with the Patient.    Counseling Resources:  ATP IV Guidelines  Pooled Cohorts Equation Calculator  Breast Cancer Risk Calculator  FRAX Risk Assessment  ICSI Preventive Guidelines  Dietary Guidelines for Americans, 2010  USDA's MyPlate  ASA Prophylaxis  Lung CA Screening    Messi Saldana MD  [facility]

## 2018-08-03 NOTE — MR AVS SNAPSHOT
After Visit Summary   8/3/2018    Alexander Alfred    MRN: [id number]           Patient Information     Date Of Birth          1945        Visit Information        Provider Department      8/3/2018 8:40 AM Messi Saldana MD [facility]        Today's Diagnoses     Encounter for routine adult health examination without abnormal findings    -  1    Hypertension goal BP (blood pressure) < 140/90        Hyperlipidemia LDL goal <130        Obstructive sleep apnea syndrome        Mild intermittent asthma without complication        EE (eosinophilic esophagitis)        Lumbar degenerative disc disease        Lumbar foraminal stenosis        Spinal stenosis of lumbar region at multiple levels        Acquired spondylolisthesis        Screen for colon cancer        Screening for prostate cancer        Medication monitoring encounter          Care Instructions    [facility]                        To reach your care team during and after hours:   [phone]  To reach our pharmacy:        [phone]    Clinic Hours                        Our clinic hours are:    Monday   7:30 am to 7:00 pm                  Tuesday through Friday 7:30 am to 5:00 pm                             Saturday   8:00 am to 12:00 pm      Sunday   Closed      Pharmacy Hours                        Our pharmacy hours are:    Monday   8:30 am to 7:00 pm       Tuesday to Friday  8:30 am to 6:00 pm                       Saturday    9:00 am to 1:00 pm              Sunday    Closed              There is also information available at our web site:  [email or web address]    If your provider ordered any lab tests and you do not receive the results within 10 business days, please call the clinic.    If you need a medication refill please contact your pharmacy.  Please allow 2-3 business days for your refill to be completed.    Our clinic offers telephone visits and e visits.  Please ask one of your team  members to explain more.      Use [employer] (secure email communication and access to your chart) to send your primary care provider a message or make an appointment. Ask someone on your Team how to sign up for Boast[employer].  Immunizations                      Immunization History   Administered Date(s) Administered     Influenza (High Dose) 3 valent vaccine 10/31/2011, 01/08/2013, 09/24/2014, 12/03/2015, 10/11/2016, 11/09/2017     Influenza (IIV3) PF 11/01/2013     Pneumo Conj 13-V (2010&after) 06/15/2015     Pneumococcal 23 valent 10/31/2011     TD (ADULT, 7+) 01/01/2004     TDAP Vaccine (Boostrix) 06/21/2012     Zoster vaccine, live 12/03/2015        Health Maintenance                         Health Maintenance Due   Topic Date Due     Asthma Action Plan - yearly  08/22/2017     Asthma Control Test - every 6 months  01/17/2018     Colon Cancer Screening - FIT Test - yearly  02/05/2018     Basic Metabolic Lab - yearly  07/17/2018     Cholesterol Lab - yearly  07/17/2018     Microalbumin Lab - yearly  07/17/2018     Prostate Test (PSA) - yearly  07/17/2018     Wellness Visit with your Primary Provider - yearly  07/17/2018     Depression Assessment 2 - yearly  07/17/2018     FALL RISK ASSESSMENT  07/17/2018           Preventive Health Recommendations:       Male Ages 65 and over    Yearly exam:             See your health care provider every year in order to  o   Review health changes.   o   Discuss preventive care.    o   Review your medicines if your doctor has prescribed any.    Talk with your health care provider about whether you should have a test to screen for prostate cancer (PSA).    Every 3 years, have a diabetes test (fasting glucose). If you are at risk for diabetes, you should have this test more often.    Every 5 years, have a cholesterol test. Have this test more often if you are at risk for high cholesterol or heart disease.     Every 10 years, have a colonoscopy. Or, have a yearly FIT test (stool test).  These exams will check for colon cancer.    Talk to with your health care provider about screening for Abdominal Aortic Aneurysm if you have a family history of AAA or have a history of smoking.  Shots:     Get a flu shot each year.     Get a tetanus shot every 10 years.     Talk to your doctor about your pneumonia vaccines. There are now two you should receive - Pneumovax (PPSV 23) and Prevnar (PCV 13).    Talk to your pharmacist about a shingles vaccine.     Talk to your doctor about the hepatitis B vaccine.  Nutrition:     Eat at least 5 servings of fruits and vegetables each day.     Eat whole-grain bread, whole-wheat pasta and brown rice instead of white grains and rice.     Get adequate Calcium and Vitamin D.   Lifestyle    Exercise for at least 150 minutes a week (30 minutes a day, 5 days a week). This will help you control your weight and prevent disease.     Limit alcohol to one drink per day.     No smoking.     Wear sunscreen to prevent skin cancer.     See your dentist every six months for an exam and cleaning.     See your eye doctor every 1 to 2 years to screen for conditions such as glaucoma, macular degeneration and cataracts.          Follow-ups after your visit        Future tests that were ordered for you today     Open Future Orders        Priority Expected Expires Ordered    Fecal colorectal cancer screen (FIT) Routine 8/24/2018 10/26/2018 8/3/2018            Who to contact     If you have questions or need follow up information about today's clinic visit or your schedule please contact [facility] directly at [phone].  Normal or non-critical lab and imaging results will be communicated to you by MyChart, letter or phone within 4 business days after the clinic has received the results. If you do not hear from us within 7 days, please contact the clinic through MyChart or phone. If you have a critical or abnormal lab result, we will notify you by phone as soon as  "possible.  Submit refill requests through Wote or call your pharmacy and they will forward the refill request to us. Please allow 3 business days for your refill to be completed.          Additional Information About Your Visit        [employer] Information     Wote gives you secure access to your electronic health record. If you see a primary care provider, you can also send messages to your care team and make appointments. If you have questions, please call your primary care clinic.  If you do not have a primary care provider, please call [phone] and they will assist you.        Care EveryWhere ID     This is your Care EveryWhere ID. This could be used by other organizations to access your [place] medical records  [id number]        Your Vitals Were     Pulse Temperature Height Pulse Oximetry BMI (Body Mass Index)       53 97.6  F (36.4  C) (Oral) 5' 8\" (1.727 m) 97% 27.52 kg/m2        Blood Pressure from Last 3 Encounters:   08/03/18 148/72   07/17/17 128/66   10/11/16 130/58    Weight from Last 3 Encounters:   08/03/18 181 lb (82.1 kg)   07/17/17 169 lb (76.7 kg)   10/11/16 179 lb 12.8 oz (81.6 kg)              We Performed the Following     *UA reflex to Microscopic and Culture ([place] and [facility] (except [place] and [place])     Albumin Random Urine Quantitative with Creat Ratio     CBC with platelets     CK total     Comprehensive metabolic panel     Lipid panel reflex to direct LDL Fasting     Prostate spec antigen screen     TSH with free T4 reflex        Primary Care Provider Office Phone # Fax #    Messi Saldana [phone]       [address] [place] [id number]        Equal Access to Services     [facility]: Hadii sally Cottrell, waaxda luqadaha, qaybta kaalmada summer jonas. So [facility] [phone].    ATENCIÓN: Si habla español, tiene a yarbrough disposición servicios gratuitos de asistencia lingüística. Llame al " [phone].    We comply with applicable federal civil rights laws and [place] laws. We do not discriminate on the basis of race, color, national origin, age, disability, sex, sexual orientation, or gender identity.            Thank you!     Thank you for choosing [facility]  for your care. Our goal is always to provide you with excellent care. Hearing back from our patients is one way we can continue to improve our services. Please take a few minutes to complete the written survey that you may receive in the mail after your visit with us. Thank you!             Your Updated Medication List - Protect others around you: Learn how to safely use, store and throw away your medicines at www.disposemymeds.org.          This list is accurate as of 8/3/18  9:25 AM.  Always use your most recent med list.                   Brand Name Dispense Instructions for use Diagnosis    4X PROBIOTIC Tabs           albuterol 108 (90 Base) MCG/ACT Inhaler    PROAIR HFA/PROVENTIL HFA/VENTOLIN HFA    3 Inhaler    Inhale 2 puffs into the lungs every 6 hours as needed for shortness of breath / dyspnea or wheezing    Mild intermittent asthma without complication       aspirin 81 MG chewable tablet     108 tablet    Take 1 tablet (81 mg) by mouth daily        CoQ-10 100 MG Caps      Take 100 mg by mouth daily        fluticasone 50 MCG/ACT spray    FLONASE    48 g    Spray 2 sprays into both nostrils daily    Nasal polyp, Obstructive sleep apnea syndrome       KRILL OIL PO      Take 1 capsule by mouth daily Taking MegaRed OTC. Strength unknown.        losartan-hydrochlorothiazide 50-12.5 MG per tablet    HYZAAR    90 tablet    Take 1 tablet by mouth daily    Hypertension goal BP (blood pressure) < 140/90       meclizine 25 MG tablet    ANTIVERT    30 tablet    Take 1 tablet (25 mg) by mouth every 6 hours as needed for dizziness    Vertigo       MULTIVITAMIN PO      Take 1 tablet by mouth daily        order for DME     1  Device    Equipment being ordered: CPAP mask    Sleep apnea       PROBIOTIC-PREBIOTIC 1-250 BILLION-MG Caps

## 2018-08-03 NOTE — PATIENT INSTRUCTIONS
[place]    [facility]                        To reach your care team during and after hours:   [phone]  To reach our pharmacy:        [phone]    Clinic Hours                        Our clinic hours are:    Monday   7:30 am to 7:00 pm                  Tuesday through Friday 7:30 am to 5:00 pm                             Saturday   8:00 am to 12:00 pm      Sunday   Closed      Pharmacy Hours                        Our pharmacy hours are:    Monday   8:30 am to 7:00 pm       Tuesday to Friday  8:30 am to 6:00 pm                       Saturday    9:00 am to 1:00 pm              Sunday    Closed              There is also information available at our web site:  [email or web address]    If your provider ordered any lab tests and you do not receive the results within 10 business days, please call the clinic.    If you need a medication refill please contact your pharmacy.  Please allow 2-3 business days for your refill to be completed.    Our clinic offers telephone visits and e visits.  Please ask one of your team members to explain more.      Use [employer] (secure email communication and access to your chart) to send your primary care provider a message or make an appointment. Ask someone on your Team how to sign up for [employer].  Immunizations                      Immunization History   Administered Date(s) Administered     Influenza (High Dose) 3 valent vaccine 10/31/2011, 01/08/2013, 09/24/2014, 12/03/2015, 10/11/2016, 11/09/2017     Influenza (IIV3) PF 11/01/2013     Pneumo Conj 13-V (2010&after) 06/15/2015     Pneumococcal 23 valent 10/31/2011     TD (ADULT, 7+) 01/01/2004     TDAP Vaccine (Boostrix) 06/21/2012     Zoster vaccine, live 12/03/2015        Health Maintenance                         Health Maintenance Due   Topic Date Due     Asthma Action Plan - yearly  08/22/2017     Asthma Control Test - every 6 months  01/17/2018     Colon Cancer Screening - FIT Test - yearly  02/05/2018      Basic Metabolic Lab - yearly  07/17/2018     Cholesterol Lab - yearly  07/17/2018     Microalbumin Lab - yearly  07/17/2018     Prostate Test (PSA) - yearly  07/17/2018     Wellness Visit with your Primary Provider - yearly  07/17/2018     Depression Assessment 2 - yearly  07/17/2018     FALL RISK ASSESSMENT  07/17/2018           Preventive Health Recommendations:       Male Ages 65 and over    Yearly exam:             See your health care provider every year in order to  o   Review health changes.   o   Discuss preventive care.    o   Review your medicines if your doctor has prescribed any.    Talk with your health care provider about whether you should have a test to screen for prostate cancer (PSA).    Every 3 years, have a diabetes test (fasting glucose). If you are at risk for diabetes, you should have this test more often.    Every 5 years, have a cholesterol test. Have this test more often if you are at risk for high cholesterol or heart disease.     Every 10 years, have a colonoscopy. Or, have a yearly FIT test (stool test). These exams will check for colon cancer.    Talk to with your health care provider about screening for Abdominal Aortic Aneurysm if you have a family history of AAA or have a history of smoking.  Shots:     Get a flu shot each year.     Get a tetanus shot every 10 years.     Talk to your doctor about your pneumonia vaccines. There are now two you should receive - Pneumovax (PPSV 23) and Prevnar (PCV 13).    Talk to your pharmacist about a shingles vaccine.     Talk to your doctor about the hepatitis B vaccine.  Nutrition:     Eat at least 5 servings of fruits and vegetables each day.     Eat whole-grain bread, whole-wheat pasta and brown rice instead of white grains and rice.     Get adequate Calcium and Vitamin D.   Lifestyle    Exercise for at least 150 minutes a week (30 minutes a day, 5 days a week). This will help you control your weight and prevent disease.     Limit alcohol to one  drink per day.     No smoking.     Wear sunscreen to prevent skin cancer.     See your dentist every six months for an exam and cleaning.     See your eye doctor every 1 to 2 years to screen for conditions such as glaucoma, macular degeneration and cataracts.

## 2018-08-07 DIAGNOSIS — R74.8 ELEVATED CK: Primary | ICD-10-CM

## 2018-08-21 PROCEDURE — 82274 ASSAY TEST FOR BLOOD FECAL: CPT | Performed by: FAMILY MEDICINE

## 2018-08-24 DIAGNOSIS — Z12.11 SCREEN FOR COLON CANCER: ICD-10-CM

## 2018-08-24 DIAGNOSIS — Z00.00 ENCOUNTER FOR ROUTINE ADULT HEALTH EXAMINATION WITHOUT ABNORMAL FINDINGS: ICD-10-CM

## 2018-08-24 LAB — HEMOCCULT STL QL IA: NEGATIVE

## 2018-10-03 ENCOUNTER — TRANSFERRED RECORDS (OUTPATIENT)
Dept: HEALTH INFORMATION MANAGEMENT | Facility: CLINIC | Age: 73
End: 2018-10-03

## 2018-10-29 ENCOUNTER — TELEPHONE (OUTPATIENT)
Dept: FAMILY MEDICINE | Facility: CLINIC | Age: 73
End: 2018-10-29

## 2018-10-29 NOTE — TELEPHONE ENCOUNTER
Constipation     Onset: Thursday of last week    Description:   Frequency of bowel movements: 24 hours.  Stool consistency: hard    Progression of Symptoms:  same    Accompanying Signs & Symptoms:  Abdominal pain (cramping?): YES  Blood in stool: no   Rectal pain: no   Nausea/vomiting: no   Weight loss or gain: no    History:   History of abdominal surgery: no     Precipitating factors:   Recent use of narcotics, anticholinergics, calcium channel blockers, antacids, or iron supplements: YES- tums almost every day  Chronic Laxative Use: no     Recent muscle strain and was on a strong nsaid and muscle relaxer and this may have come from this.     Therapies Tried and outcome: stool softeners senokot this am two yesterday, fleet enema did not help    [phone], able to LM. Advised pt to continue warm fluids this am. Advised per AVS for constipation:     Eat fresh vegetables and fruit every day.   Exercise regularly. For example, if you are able, walk for at least 30 minutes every day. Check with your healthcare provider before adding any new exercise.   Drink prune juice or eat stewed fruits at breakfast.   Drink enough liquids each day to keep your urine light yellow in color.   Increase the whole-grain fiber in your diet by eating cereals with 5 or more grams of fiber per serving (for example, shredded wheat or bran flakes).   Ask your healthcare provider about taking fiber products or laxatives or giving yourself an enema. You can take a fiber product like Metamucil or Citrucel once or twice a day for several days if you are constipated. Avoid overusing other laxatives, such as cathartics, which are products that will cause a liquid bowel movement. Cathartics, including Milk of magnesia or Epsom salt, irritate the lining of the intestines.   Drink more fluids.   Add more fiber to your diet, such as bran muffins, irina crackers, oatmeal, brown rice, whole wheat bread, fresh fruits and vegetables, and popcorn.    Get more exercise.   Make sure that you go to the bathroom whenever you feel that you need to go. Don t wait.      Pt wondered if they could get any other recommendations from TS.    Usha Larry RN  [facility]

## 2018-10-29 NOTE — TELEPHONE ENCOUNTER
Message handled by Nurse Triage with Huddle - provider name: MD LIS - Advise miralax. 1 capful with 8oz water daily. .        Shyla Blanchard RN  [facility]

## 2018-10-29 NOTE — TELEPHONE ENCOUNTER
The patient indicates understanding of these issues and agrees with the plan.  Usha Larry RN  [facility]

## 2018-11-02 ENCOUNTER — TRANSFERRED RECORDS (OUTPATIENT)
Dept: HEALTH INFORMATION MANAGEMENT | Facility: CLINIC | Age: 73
End: 2018-11-02

## 2018-11-05 ENCOUNTER — TRANSFERRED RECORDS (OUTPATIENT)
Dept: HEALTH INFORMATION MANAGEMENT | Facility: CLINIC | Age: 73
End: 2018-11-05

## 2018-11-28 ENCOUNTER — TRANSFERRED RECORDS (OUTPATIENT)
Dept: HEALTH INFORMATION MANAGEMENT | Facility: CLINIC | Age: 73
End: 2018-11-28

## 2019-01-04 DIAGNOSIS — J33.9 NASAL POLYP: ICD-10-CM

## 2019-01-04 DIAGNOSIS — G47.33 OBSTRUCTIVE SLEEP APNEA SYNDROME: ICD-10-CM

## 2019-01-04 DIAGNOSIS — I10 HYPERTENSION GOAL BP (BLOOD PRESSURE) < 140/90: ICD-10-CM

## 2019-01-04 NOTE — TELEPHONE ENCOUNTER
"Requested Prescriptions   Pending Prescriptions Disp Refills     losartan-hydrochlorothiazide (HYZAAR) 50-12.5 MG tablet 90 tablet 3      Last Written Prescription Date:  8.3.18  Last Fill Quantity: 90,  # refills: 3   Last Office Visit: 8/3/2018   Future Office Visit:      Sig: Take 1 tablet by mouth daily    Angiotensin-II Receptors Failed - 1/4/2019  2:39 PM       Failed - Blood pressure under 140/90 in past 12 months    BP Readings from Last 3 Encounters:   08/03/18 148/72   07/17/17 128/66   10/11/16 130/58                Passed - Recent (12 mo) or future (30 days) visit within the authorizing provider's specialty    Patient had office visit in the last 12 months or has a visit in the next 30 days with authorizing provider or within the authorizing provider's specialty.  See \"Patient Info\" tab in inbasket, or \"Choose Columns\" in Meds & Orders section of the refill encounter.             Passed - Medication is active on med list       Passed - Patient is age 18 or older       Passed - Normal serum creatinine on file in past 12 months    Recent Labs   Lab Test 08/03/18  0937   CR 1.05            Passed - Normal serum potassium on file in past 12 months    Recent Labs   Lab Test 08/03/18  0937   POTASSIUM 3.9                    fluticasone (FLONASE) 50 MCG/ACT nasal spray 48 g 3      Last Written Prescription Date:  8.3.18  Last Fill Quantity: 48 g,  # refills: 3   Last Office Visit: 8/3/2018   Future Office Visit:      Sig: Spray 2 sprays into both nostrils daily    Inhaled Steroids Protocol Failed - 1/4/2019  2:39 PM       Failed - Asthma control assessment score within normal limits in last 6 months    Please review ACT score.          Passed - Patient is age 12 or older       Passed - Medication is active on med list       Passed - Recent (6 mo) or future (30 days) visit within the authorizing provider's specialty    Patient had office visit in the last 6 months or has a visit in the next 30 days with " "authorizing provider or within the authorizing provider's specialty.  See \"Patient Info\" tab in inbasket, or \"Choose Columns\" in Meds & Orders section of the refill encounter.              "

## 2019-01-07 RX ORDER — FLUTICASONE PROPIONATE 50 MCG
2 SPRAY, SUSPENSION (ML) NASAL DAILY
Qty: 48 G | Refills: 3 | Status: SHIPPED | OUTPATIENT
Start: 2019-01-07 | End: 2019-08-08

## 2019-01-08 ENCOUNTER — ALLIED HEALTH/NURSE VISIT (OUTPATIENT)
Dept: NURSING | Facility: CLINIC | Age: 74
End: 2019-01-08
Payer: COMMERCIAL

## 2019-01-08 VITALS — SYSTOLIC BLOOD PRESSURE: 130 MMHG | DIASTOLIC BLOOD PRESSURE: 64 MMHG

## 2019-01-08 DIAGNOSIS — I10 HYPERTENSION GOAL BP (BLOOD PRESSURE) < 140/90: Primary | ICD-10-CM

## 2019-01-08 DIAGNOSIS — R74.8 ELEVATED CK: ICD-10-CM

## 2019-01-08 LAB — CK SERPL-CCNC: 299 U/L (ref 30–300)

## 2019-01-08 PROCEDURE — 36415 COLL VENOUS BLD VENIPUNCTURE: CPT | Performed by: FAMILY MEDICINE

## 2019-01-08 PROCEDURE — 99207 ZZC NO CHARGE NURSE ONLY: CPT

## 2019-01-08 PROCEDURE — 82550 ASSAY OF CK (CPK): CPT | Performed by: FAMILY MEDICINE

## 2019-01-08 NOTE — PROGRESS NOTES
Alexander Alfred is a 73 year old year old patient who comes in today for a Blood Pressure check because of ongoing blood pressure monitoring.    Vital Signs as repeated by /64  Patient is taking medication as prescribed  Patient is tolerating medications well.  Patient is not monitoring Blood Pressure at home.    Current complaints: none  Disposition:  patient to continue with the same medication      Patient eats a low salt diet and exercises 3x/week.    BELINDA White, RN, N  [facility]) [phone]

## 2019-01-09 RX ORDER — LOSARTAN POTASSIUM AND HYDROCHLOROTHIAZIDE 12.5; 5 MG/1; MG/1
1 TABLET ORAL DAILY
Qty: 90 TABLET | Refills: 1 | Status: SHIPPED | OUTPATIENT
Start: 2019-01-09 | End: 2019-08-08

## 2019-01-09 NOTE — TELEPHONE ENCOUNTER
BP Readings from Last 6 Encounters:   01/08/19 130/64   08/03/18 148/72   07/17/17 128/66   10/11/16 130/58   08/26/16 164/82   08/22/16 158/70     Refilled per RN Protocol.     Shyla Blanchard RN  [facility]

## 2019-01-10 ENCOUNTER — TELEPHONE (OUTPATIENT)
Dept: FAMILY MEDICINE | Facility: CLINIC | Age: 74
End: 2019-01-10

## 2019-01-10 DIAGNOSIS — G47.33 OBSTRUCTIVE SLEEP APNEA SYNDROME: Primary | ICD-10-CM

## 2019-01-10 NOTE — TELEPHONE ENCOUNTER
MD LIS received an email from patient requesting new Rx for CPAP replacement parts: mask and nasal pieces.   Patient to  script @  when complete.     Message handled by Nurse Triage with Huddle - provider name: MD LIS - OK to order.    Rx written, printed, signed by MD LIS  Placed at  for patient pick-up    Called patient @ [phone] - advised of above  Patient stated an understanding and agreed with plan.    Leaving encounter open for documentation after patient picks up.       Shyla Blanchard RN  [facility]

## 2019-04-29 ENCOUNTER — TRANSFERRED RECORDS (OUTPATIENT)
Dept: HEALTH INFORMATION MANAGEMENT | Facility: CLINIC | Age: 74
End: 2019-04-29

## 2019-05-09 ENCOUNTER — TRANSFERRED RECORDS (OUTPATIENT)
Dept: HEALTH INFORMATION MANAGEMENT | Facility: CLINIC | Age: 74
End: 2019-05-09

## 2019-06-03 ENCOUNTER — TRANSFERRED RECORDS (OUTPATIENT)
Dept: HEALTH INFORMATION MANAGEMENT | Facility: CLINIC | Age: 74
End: 2019-06-03

## 2019-06-13 ENCOUNTER — TRANSFERRED RECORDS (OUTPATIENT)
Dept: HEALTH INFORMATION MANAGEMENT | Facility: CLINIC | Age: 74
End: 2019-06-13

## 2019-07-22 ENCOUNTER — TRANSFERRED RECORDS (OUTPATIENT)
Dept: HEALTH INFORMATION MANAGEMENT | Facility: CLINIC | Age: 74
End: 2019-07-22

## 2019-08-08 ENCOUNTER — OFFICE VISIT (OUTPATIENT)
Dept: FAMILY MEDICINE | Facility: CLINIC | Age: 74
End: 2019-08-08
Payer: COMMERCIAL

## 2019-08-08 VITALS
TEMPERATURE: 98.1 F | DIASTOLIC BLOOD PRESSURE: 62 MMHG | WEIGHT: 178 LBS | HEIGHT: 68 IN | HEART RATE: 59 BPM | BODY MASS INDEX: 26.98 KG/M2 | OXYGEN SATURATION: 96 % | SYSTOLIC BLOOD PRESSURE: 128 MMHG

## 2019-08-08 DIAGNOSIS — M51.369 LUMBAR DEGENERATIVE DISC DISEASE: ICD-10-CM

## 2019-08-08 DIAGNOSIS — K20.0 EE (EOSINOPHILIC ESOPHAGITIS): ICD-10-CM

## 2019-08-08 DIAGNOSIS — R05.3 CHRONIC COUGH: ICD-10-CM

## 2019-08-08 DIAGNOSIS — G47.33 OBSTRUCTIVE SLEEP APNEA SYNDROME: ICD-10-CM

## 2019-08-08 DIAGNOSIS — E78.5 HYPERLIPIDEMIA LDL GOAL <130: ICD-10-CM

## 2019-08-08 DIAGNOSIS — Z00.00 ENCOUNTER FOR ROUTINE ADULT HEALTH EXAMINATION WITHOUT ABNORMAL FINDINGS: Primary | ICD-10-CM

## 2019-08-08 DIAGNOSIS — I10 HYPERTENSION GOAL BP (BLOOD PRESSURE) < 140/90: ICD-10-CM

## 2019-08-08 DIAGNOSIS — Z12.5 SCREENING FOR PROSTATE CANCER: ICD-10-CM

## 2019-08-08 DIAGNOSIS — M48.061 LUMBAR FORAMINAL STENOSIS: ICD-10-CM

## 2019-08-08 DIAGNOSIS — Z12.11 SCREEN FOR COLON CANCER: ICD-10-CM

## 2019-08-08 DIAGNOSIS — M48.061 SPINAL STENOSIS OF LUMBAR REGION AT MULTIPLE LEVELS: ICD-10-CM

## 2019-08-08 DIAGNOSIS — M43.10 ACQUIRED SPONDYLOLISTHESIS: ICD-10-CM

## 2019-08-08 DIAGNOSIS — Z51.81 MEDICATION MONITORING ENCOUNTER: ICD-10-CM

## 2019-08-08 DIAGNOSIS — J45.20 MILD INTERMITTENT ASTHMA WITHOUT COMPLICATION: ICD-10-CM

## 2019-08-08 DIAGNOSIS — J33.9 NASAL POLYP: ICD-10-CM

## 2019-08-08 LAB
ALBUMIN SERPL-MCNC: 3.6 G/DL (ref 3.4–5)
ALBUMIN UR-MCNC: NEGATIVE MG/DL
ALP SERPL-CCNC: 101 U/L (ref 40–150)
ALT SERPL W P-5'-P-CCNC: 45 U/L (ref 0–70)
ANION GAP SERPL CALCULATED.3IONS-SCNC: 9 MMOL/L (ref 3–14)
APPEARANCE UR: CLEAR
AST SERPL W P-5'-P-CCNC: 26 U/L (ref 0–45)
BILIRUB SERPL-MCNC: 0.4 MG/DL (ref 0.2–1.3)
BILIRUB UR QL STRIP: NEGATIVE
BUN SERPL-MCNC: 17 MG/DL (ref 7–30)
CALCIUM SERPL-MCNC: 8.7 MG/DL (ref 8.5–10.1)
CHLORIDE SERPL-SCNC: 108 MMOL/L (ref 94–109)
CHOLEST SERPL-MCNC: 158 MG/DL
CK SERPL-CCNC: 280 U/L (ref 30–300)
CO2 SERPL-SCNC: 25 MMOL/L (ref 20–32)
COLOR UR AUTO: YELLOW
CREAT SERPL-MCNC: 1 MG/DL (ref 0.66–1.25)
CREAT UR-MCNC: 170 MG/DL
ERYTHROCYTE [DISTWIDTH] IN BLOOD BY AUTOMATED COUNT: 12.9 % (ref 10–15)
GFR SERPL CREATININE-BSD FRML MDRD: 74 ML/MIN/{1.73_M2}
GLUCOSE SERPL-MCNC: 115 MG/DL (ref 70–99)
GLUCOSE UR STRIP-MCNC: NEGATIVE MG/DL
HCT VFR BLD AUTO: 42.8 % (ref 40–53)
HDLC SERPL-MCNC: 45 MG/DL
HGB BLD-MCNC: 14.4 G/DL (ref 13.3–17.7)
HGB UR QL STRIP: NEGATIVE
KETONES UR STRIP-MCNC: NEGATIVE MG/DL
LDLC SERPL CALC-MCNC: 45 MG/DL
LEUKOCYTE ESTERASE UR QL STRIP: NEGATIVE
MCH RBC QN AUTO: 32.5 PG (ref 26.5–33)
MCHC RBC AUTO-ENTMCNC: 33.6 G/DL (ref 31.5–36.5)
MCV RBC AUTO: 97 FL (ref 78–100)
MICROALBUMIN UR-MCNC: 8 MG/L
MICROALBUMIN/CREAT UR: 4.83 MG/G CR (ref 0–17)
NITRATE UR QL: NEGATIVE
NONHDLC SERPL-MCNC: 113 MG/DL
PH UR STRIP: 6 PH (ref 5–7)
PLATELET # BLD AUTO: 255 10E9/L (ref 150–450)
POTASSIUM SERPL-SCNC: 3.6 MMOL/L (ref 3.4–5.3)
PROT SERPL-MCNC: 6.9 G/DL (ref 6.8–8.8)
PSA SERPL-ACNC: 0.82 UG/L (ref 0–4)
RBC # BLD AUTO: 4.43 10E12/L (ref 4.4–5.9)
SODIUM SERPL-SCNC: 142 MMOL/L (ref 133–144)
SOURCE: NORMAL
SP GR UR STRIP: 1.02 (ref 1–1.03)
TRIGL SERPL-MCNC: 342 MG/DL
TSH SERPL DL<=0.005 MIU/L-ACNC: 2.16 MU/L (ref 0.4–4)
UROBILINOGEN UR STRIP-ACNC: 0.2 EU/DL (ref 0.2–1)
WBC # BLD AUTO: 6.9 10E9/L (ref 4–11)

## 2019-08-08 PROCEDURE — 80061 LIPID PANEL: CPT | Performed by: FAMILY MEDICINE

## 2019-08-08 PROCEDURE — G0439 PPPS, SUBSEQ VISIT: HCPCS | Performed by: FAMILY MEDICINE

## 2019-08-08 PROCEDURE — 82550 ASSAY OF CK (CPK): CPT | Performed by: FAMILY MEDICINE

## 2019-08-08 PROCEDURE — 99207 C PAF COMPLETED  NO CHARGE: CPT | Performed by: FAMILY MEDICINE

## 2019-08-08 PROCEDURE — 80053 COMPREHEN METABOLIC PANEL: CPT | Performed by: FAMILY MEDICINE

## 2019-08-08 PROCEDURE — 85027 COMPLETE CBC AUTOMATED: CPT | Performed by: FAMILY MEDICINE

## 2019-08-08 PROCEDURE — G0103 PSA SCREENING: HCPCS | Performed by: FAMILY MEDICINE

## 2019-08-08 PROCEDURE — 36415 COLL VENOUS BLD VENIPUNCTURE: CPT | Performed by: FAMILY MEDICINE

## 2019-08-08 PROCEDURE — 84443 ASSAY THYROID STIM HORMONE: CPT | Performed by: FAMILY MEDICINE

## 2019-08-08 PROCEDURE — 82043 UR ALBUMIN QUANTITATIVE: CPT | Performed by: FAMILY MEDICINE

## 2019-08-08 PROCEDURE — 81003 URINALYSIS AUTO W/O SCOPE: CPT | Performed by: FAMILY MEDICINE

## 2019-08-08 RX ORDER — FLUTICASONE PROPIONATE 50 MCG
2 SPRAY, SUSPENSION (ML) NASAL DAILY
Qty: 48 G | Refills: 3 | Status: SHIPPED | OUTPATIENT
Start: 2019-08-08 | End: 2020-09-09

## 2019-08-08 RX ORDER — ALBUTEROL SULFATE 90 UG/1
2 AEROSOL, METERED RESPIRATORY (INHALATION) EVERY 6 HOURS PRN
Qty: 3 INHALER | Refills: 3 | Status: SHIPPED | OUTPATIENT
Start: 2019-08-08 | End: 2020-09-09

## 2019-08-08 RX ORDER — LOSARTAN POTASSIUM AND HYDROCHLOROTHIAZIDE 12.5; 5 MG/1; MG/1
1 TABLET ORAL DAILY
Qty: 90 TABLET | Refills: 3 | Status: SHIPPED | OUTPATIENT
Start: 2019-08-08 | End: 2020-09-09

## 2019-08-08 ASSESSMENT — MIFFLIN-ST. JEOR: SCORE: 1526.9

## 2019-08-08 NOTE — PROGRESS NOTES
"SUBJECTIVE:   Alexander Alfred is a 74 year old male who presents for Preventive Visit    Are you in the first 12 months of your Medicare Part B coverage?  No    Physical Health:    In general, how would you rate your overall physical health? good    Outside of work, how many days during the week do you exercise? 2-3 days/week    Outside of work, approximately how many minutes a day do you exercise?30-45 minutes    If you drink alcohol do you typically have >3 drinks per day or >7 drinks per week? No    Do you usually eat at least 4 servings of fruit and vegetables a day, include whole grains & fiber and avoid regularly eating high fat or \"junk\" foods? Yes    Do you have any problems taking medications regularly?  No    Do you have any side effects from medications? none    Needs assistance for the following daily activities: no assistance needed    Which of the following safety concerns are present in your home?  none identified     Hearing impairment: No    In the past 6 months, have you been bothered by leaking of urine? no    Hypertension: Patient presents today as normotensive. Patient is currently prescribed 50-12.5 mg Losartan-hydrochlorothiazide daily for hypertension management.    BP Readings from Last 5 Encounters:   08/08/19 128/62   01/08/19 130/64   08/03/18 148/72   07/17/17 128/66   10/11/16 130/58     Creatinine   Date Value Ref Range Status   08/03/2018 1.05 0.66 - 1.25 mg/dL Final     Hyperlipidemia: Patient's hyperlipidemia is well controlled. Patient is not currently prescribed any medication for hyperlipidemia management.    Recent Labs   Lab Test 08/03/18  0937 07/17/17  1436  06/15/15  1002 06/26/13  1041   CHOL 174 185   < > 191 176   HDL 52 66   < > 58 52   LDL 89 92   < > 105 94   TRIG 166* 133   < > 140 150   CHOLHDLRATIO  --   --   --  3.3 3.4    < > = values in this interval not displayed.     Sleep Apnea: Stable. Patient is currently prescribed a CPAP machine for sleep apnea management. " Patient reports that he uses his machine every night.    Asthma: Stable. Patient had an ACT score of 25 today. Patient's asthma is well controlled. Patient is currently prescribed an Albuterol inhaler for asthma management.    Eosinophilic Esophagitis: Stable. No issues.     Cough: Patient reports he has been having a persistent cough over the last 2 months. Patient states he has an episode every 8-10 minutes and states that his cough is productive of clear sputum. Patient denies any SOB or wheezing.     Patient reports that he has had some decrease in the muscle mass of his left calf. Patient reports he was seen by Neurology for this issue who reported that the nerve was not working that controls his left calf. Patient was not recommended any treatment options. Patient would like to know what to do next. Patient denies any foot dropping.     After follow up with Dr Camacho, consider Dr Cochran,     Mental Health:    In general, how would you rate your overall mental or emotional health? good  PHQ-2 Score:      Do you feel safe in your environment? Yes    Do you have a Health Care Directive? Yes: Advance Directive has been received and scanned.    Additional concerns to address?  YES    Fall risk:  Fallen 2 or more times in the past year?: No  Any fall with injury in the past year?: No  click delete button to remove this line now  Cognitive Screenin) Repeat 3 items (Leader, Season, Table)      2) Clock draw:   NORMAL  3) 3 item recall:   Recalls 3 objects  Results: 3 items recalled: COGNITIVE IMPAIRMENT LESS LIKELY    Mini-CogTM Copyright S Carlie. Licensed by the author for use in [facility]; reprinted with permission (jean [email or web address]). All rights reserved.      Do you have sleep apnea, excessive snoring or daytime drowsiness?: has CPAP machine    Reviewed and updated as needed this visit by clinical staff  Tobacco  Allergies  Meds  Soc Hx      Reviewed and updated as needed this visit by  Provider        Social History     Tobacco Use     Smoking status: Never Smoker     Smokeless tobacco: Never Used   Substance Use Topics     Alcohol use: Yes     Alcohol/week: 7.0 oz     Types: 14 Standard drinks or equivalent per week     Comment: 2 drinks per day avg                           Current providers sharing in care for this patient include:   Patient Care Team:  Messi Saldana MD as PCP - General  Messi Saldana MD as Assigned PCP    The following health maintenance items are reviewed in Epic and correct as of today:  Health Maintenance   Topic Date Due     ZOSTER IMMUNIZATION (2 of 3) 01/28/2016     ASTHMA ACTION PLAN  08/22/2017     ASTHMA CONTROL TEST  01/17/2018     PHQ-2  01/01/2019     BMP  08/03/2019     LIPID  08/03/2019     MICROALBUMIN  08/03/2019     FALL RISK ASSESSMENT  08/03/2019     MEDICARE ANNUAL WELLNESS VISIT  08/03/2019     PSA  08/03/2019     FIT  08/21/2019     INFLUENZA VACCINE (1) 09/01/2019     DTAP/TDAP/TD IMMUNIZATION (3 - Td) 06/21/2022     ADVANCE CARE PLANNING  08/03/2023     HEPATITIS C SCREENING  Completed     PNEUMOCOCCAL IMMUNIZATION 65+ LOW/MEDIUM RISK  Completed     AORTIC ANEURYSM SCREENING (SYSTEM ASSIGNED)  Completed     IPV IMMUNIZATION  Aged Out     MENINGITIS IMMUNIZATION  Aged Out     Health Maintenance     Colonoscopy:  Not on file   FIT:  Last 8/21/2018, Due              PSA:  Last 8/3/2018, Due   DEXA:  N/A    Health Maintenance Due   Topic Date Due     ZOSTER IMMUNIZATION (2 of 3) 01/28/2016     ASTHMA ACTION PLAN  08/22/2017     ASTHMA CONTROL TEST  01/17/2018     PHQ-2  01/01/2019     BMP  08/03/2019     LIPID  08/03/2019     MICROALBUMIN  08/03/2019     FALL RISK ASSESSMENT  08/03/2019     MEDICARE ANNUAL WELLNESS VISIT  08/03/2019     PSA  08/03/2019     FIT  08/21/2019       Current Problem List    Patient Active Problem List   Diagnosis     Nasal polyps     Asthma, mild intermittent     Sleep apnea     Hyperlipidemia LDL goal <130     Hypertension goal BP  (blood pressure) < 140/90     EE (eosinophilic esophagitis)     Osteoarthritis of left hip     Lumbar facet arthropathy     Lumbar degenerative disc disease     Lumbar pain     Left lumbosacral radiculopathy     Spondylolisthesis of lumbar region     Lumbar foraminal stenosis     Central spinal stenosis     Spinal stenosis of lumbar region at multiple levels     Acquired spondylolisthesis     Closed traumatic nondisplaced fracture of distal end of left fibula     Left ankle pain     Ankle fracture     Advanced directives, counseling/discussion     Lumbar stenosis       Past Medical History    Past Medical History:   Diagnosis Date     Asthma, mild intermittent     [place] asthma     EE (eosinophilic esophagitis) 8/13    Dr Ramirez     Hyperlipidemia LDL goal <130      Hypertension goal BP (blood pressure) < 140/90      Lumbar stenosis     Dr Knight - Calf weakness/atrophy     Nasal polyps 2004    approx 2/year - increased sx from nasal polyps - prednisone 40 mg x 2 days, 20 mg x 2 days, 10mg x 10 days with excellent results     Other chronic pain      Sleep apnea 2006    moderate - CPAP - 5-15 cm       Past Surgical History    Past Surgical History:   Procedure Laterality Date     COLONOSCOPY  2010    normal - due 2020     DECOMPRESSION, FUSION LUMBAR POSTERIOR THREE + LEVELS, COMBINED N/A 10/7/2014    Dr Knight - COMBINED DECOMPRESSION, FUSION LUMBAR POSTERIOR THREE + LEVELS     ESOPHAGOSCOPY, GASTROSCOPY, DUODENOSCOPY (EGD), COMBINED  8/7/2013    Dr Ramirez - EE, non erosive gastropathy     ROTATOR CUFF REPAIR RT/LT Left 2005    Lt shoulder - dr armaan lazar     ROTATOR CUFF REPAIR RT/LT Left 05/2009    Lt shoulder - dr armaan lazar     stress echo  7/11    normal     STRESS ECHO (METRO)  6/15    normal stress echo     SURGICAL HISTORY OF -   1982    Lt ankle CRIF     SURGICAL HISTORY OF -   4/08    blephoplasty     SURGICAL HISTORY OF -   11/16    T12-L1 - Decompression - Dr Knight -      TONSILLECTOMY   1950       Current Medications    Current Outpatient Medications   Medication Sig Dispense Refill     albuterol (PROAIR HFA/PROVENTIL HFA/VENTOLIN HFA) 108 (90 Base) MCG/ACT inhaler Inhale 2 puffs into the lungs every 6 hours as needed for shortness of breath / dyspnea or wheezing 3 Inhaler 3     aspirin 81 MG chewable tablet Take 1 tablet (81 mg) by mouth daily 108 tablet 3     fluticasone (FLONASE) 50 MCG/ACT nasal spray Spray 2 sprays into both nostrils daily 48 g 3     losartan-hydrochlorothiazide (HYZAAR) 50-12.5 MG tablet Take 1 tablet by mouth daily 90 tablet 3     Coenzyme Q10 (COQ-10) 100 MG CAPS Take 100 mg by mouth daily       KRILL OIL PO Take 1 capsule by mouth daily Taking MegaRed OTC. Strength unknown.       Multiple Vitamin (MULTIVITAMIN OR) Take 1 tablet by mouth daily        order for DME Equipment being ordered: CPAP Replacement Parts - Mask and Nasal Pieces 3 each 0     order for DME Equipment being ordered: CPAP mask 1 Device 0     Probiotic Product (4X PROBIOTIC) TABS          Allergies    Allergies   Allergen Reactions     Codeine GI Disturbance     Hydrocodone GI Disturbance     Oxycodone GI Disturbance       Immunizations    Immunization History   Administered Date(s) Administered     Influenza (High Dose) 3 valent vaccine 10/31/2011, 01/08/2013, 09/24/2014, 12/03/2015, 10/11/2016, 11/09/2017     Influenza (IIV3) PF 11/01/2013     Influenza (intradermal) 10/10/2018     Pneumo Conj 13-V (2010&after) 06/15/2015     Pneumococcal 23 valent 10/31/2011     TD (ADULT, 7+) 01/01/2004     TDAP Vaccine (Boostrix) 06/21/2012     Zoster vaccine, live 12/03/2015       Family History    Family History   Problem Relation Age of Onset     Hypertension Father      C.A.D. Father         smoker     C.A.ZOEY. Cousin         cousin - at 45     Hypertension Maternal Grandfather      JOSEPH. Maternal Grandfather      Coronary Artery Disease Maternal Uncle      Coronary Artery Disease Maternal Uncle        Social  History    Social History     Socioeconomic History     Marital status:      Spouse name: Valeria     Number of children: 3     Years of education: 16     Highest education level: Not on file   Occupational History     Employer: [employer]   Social Needs     Financial resource strain: Not on file     Food insecurity:     Worry: Not on file     Inability: Not on file     Transportation needs:     Medical: Not on file     Non-medical: Not on file   Tobacco Use     Smoking status: Never Smoker     Smokeless tobacco: Never Used   Substance and Sexual Activity     Alcohol use: Yes     Alcohol/week: 7.0 oz     Types: 14 Standard drinks or equivalent per week     Comment: 2 drinks per day avg     Drug use: No     Sexual activity: Yes     Partners: Female     Birth control/protection: Rhythm, Male Surgical   Lifestyle     Physical activity:     Days per week: Not on file     Minutes per session: Not on file     Stress: Not on file   Relationships     Social connections:     Talks on phone: Not on file     Gets together: Not on file     Attends Confucianist service: Not on file     Active member of club or organization: Not on file     Attends meetings of clubs or organizations: Not on file     Relationship status: Not on file     Intimate partner violence:     Fear of current or ex partner: Not on file     Emotionally abused: Not on file     Physically abused: Not on file     Forced sexual activity: Not on file   Other Topics Concern     Parent/sibling w/ CABG, MI or angioplasty before 65F 55M? Yes      Service Not Asked     Blood Transfusions Not Asked     Caffeine Concern Yes     Comment: 2-3 cups daily     Occupational Exposure Not Asked     Hobby Hazards Not Asked     Sleep Concern Not Asked     Stress Concern Not Asked     Weight Concern Not Asked     Special Diet Not Asked     Back Care Not Asked     Exercise Yes     Comment: 3+/wk, landscaping, Mtn Bike riding     Bike Helmet Not Asked     Seat Belt Yes      "Self-Exams Not Asked   Social History Narrative     Not on file       ROS:  Constitutional, HEENT, cardiovascular, pulmonary, GI, , musculoskeletal, neuro, skin, endocrine and psych systems are negative, except as otherwise noted.    OBJECTIVE:   /62   Pulse 59   Temp 98.1  F (36.7  C) (Oral)   Ht 1.727 m (5' 8\")   Wt 80.7 kg (178 lb)   SpO2 96%   BMI 27.06 kg/m   Estimated body mass index is 27.06 kg/m  as calculated from the following:    Height as of this encounter: 1.727 m (5' 8\").    Weight as of this encounter: 80.7 kg (178 lb).  EXAM:   GENERAL: healthy, alert and no distress  EYES: Eyes grossly normal to inspection  HENT:ear canals and TM's normal upon viewing with otoscope, nose and mouth without ulcers or lesions upon viewing with otoscope  NECK: no adenopathy, no asymmetry, masses, or scars and thyroid normal to palpation  RESP: lungs clear to auscultation - no rales, rhonchi or wheezes  CV: regular rate and rhythm, normal S1 S2, no S3 or S4, no murmur, click or rub, no peripheral edema and peripheral pulses strong  ABDOMEN: soft, nontender, no hepatosplenomegaly, no masses and bowel sounds normal   (male): normal male genitalia without lesions or urethral discharge, no hernia  RECTAL: normal sphincter tone, no rectal masses, prostate 1+, smooth, nontender without nodules or masses  MS: no gross musculoskeletal defects noted, no edema, left calf atrophy  SKIN: no suspicious lesions or rashes  NEURO: Normal strength and tone, mentation intact and speech normal  PSYCH: mentation appears normal, affect normal/bright  BACK: no CVA tenderness, no paralumbar tenderness    Diagnostic Test Results:  Results for orders placed or performed in visit on 08/08/19 (from the past 24 hour(s))   CBC with platelets   Result Value Ref Range    WBC 6.9 4.0 - 11.0 10e9/L    RBC Count 4.43 4.4 - 5.9 10e12/L    Hemoglobin 14.4 13.3 - 17.7 g/dL    Hematocrit 42.8 40.0 - 53.0 %    MCV 97 78 - 100 fl    MCH 32.5 " 26.5 - 33.0 pg    MCHC 33.6 31.5 - 36.5 g/dL    RDW 12.9 10.0 - 15.0 %    Platelet Count 255 150 - 450 10e9/L   UA reflex to Microscopic and Culture   Result Value Ref Range    Color Urine Yellow     Appearance Urine Clear     Glucose Urine Negative NEG^Negative mg/dL    Bilirubin Urine Negative NEG^Negative    Ketones Urine Negative NEG^Negative mg/dL    Specific Gravity Urine 1.025 1.003 - 1.035    Blood Urine Negative NEG^Negative    pH Urine 6.0 5.0 - 7.0 pH    Protein Albumin Urine Negative NEG^Negative mg/dL    Urobilinogen Urine 0.2 0.2 - 1.0 EU/dL    Nitrite Urine Negative NEG^Negative    Leukocyte Esterase Urine Negative NEG^Negative    Source Midstream Urine        Labs Pending    ASSESSMENT / PLAN:       ICD-10-CM    1. Encounter for routine adult health examination without abnormal findings Z00.00 PAF COMPLETED     Comprehensive metabolic panel     Lipid panel reflex to direct LDL Fasting     CK total     CBC with platelets     TSH with free T4 reflex     UA reflex to Microscopic and Culture     Albumin Random Urine Quantitative with Creat Ratio     Prostate spec antigen screen     Fecal colorectal cancer screen FIT   2. Obstructive sleep apnea syndrome G47.33 TSH with free T4 reflex     fluticasone (FLONASE) 50 MCG/ACT nasal spray   3. Chronic cough R05 CT Chest w/o & w Contrast     General PFT Lab (Please always keep checked)     Pulmonary Function Test   4. Mild intermittent asthma without complication J45.20 TSH with free T4 reflex     albuterol (PROAIR HFA/PROVENTIL HFA/VENTOLIN HFA) 108 (90 Base) MCG/ACT inhaler     CT Chest w/o & w Contrast     General PFT Lab (Please always keep checked)     Pulmonary Function Test   5. Hypertension goal BP (blood pressure) < 140/90 I10 Comprehensive metabolic panel     TSH with free T4 reflex     UA reflex to Microscopic and Culture     Albumin Random Urine Quantitative with Creat Ratio     losartan-hydrochlorothiazide (HYZAAR) 50-12.5 MG tablet   6. EE  (eosinophilic esophagitis) K20.0 CBC with platelets     TSH with free T4 reflex   7. Hyperlipidemia LDL goal <130 E78.5 Comprehensive metabolic panel     Lipid panel reflex to direct LDL Fasting     CK total     TSH with free T4 reflex   8. Lumbar degenerative disc disease M51.36 TSH with free T4 reflex   9. Lumbar foraminal stenosis M99.83 TSH with free T4 reflex   10. Spinal stenosis of lumbar region at multiple levels M48.061 TSH with free T4 reflex   11. Acquired spondylolisthesis M43.10 TSH with free T4 reflex   12. Nasal polyps J33.9 fluticasone (FLONASE) 50 MCG/ACT nasal spray   13. Screen for colon cancer Z12.11 TSH with free T4 reflex     Fecal colorectal cancer screen FIT   14. Screening for prostate cancer Z12.5 TSH with free T4 reflex     Prostate spec antigen screen   15. Medication monitoring encounter Z51.81 PAF COMPLETED     Comprehensive metabolic panel     Lipid panel reflex to direct LDL Fasting     CK total     CBC with platelets     TSH with free T4 reflex     UA reflex to Microscopic and Culture     Albumin Random Urine Quantitative with Creat Ratio     Discussed treatment/modality options, including risk and benefits, he desires advised aspirin 81 mg po daily, advised 1 multivitamin per day, advised dentist every 6 months, advised diet and exercise and advised opthalmologist every 1-2 years. All diagnosis above reviewed and noted above, otherwise stable.  See [facility] orders for further details.      1) Patient presents today as normotensive. Patient is currently prescribed 50-12.5 mg Losartan-hydrochlorothiazide daily for hypertension management. Advised continued use.     2) Patient is currently prescribed a CPAP machine for sleep apnea management. Advised continued use.     3) Patient had an ACT score of 25 today. Patient's asthma is well controlled. Patient is currently prescribed an Albuterol inhaler for asthma management. Advised continued use.     4) Prescriptions refilled today.     5)  "Recommend Shingrix vaccine.     6) Recommend follow up with Neurology for further management of decreased calf muscle mass of left calf, Dr Camacho, Consider Dr Cochran,     7) Chest CT and Pulmonary Function test ordered today for further evaluation of persistent cough, consider pulmonary.     8) Follow up in 1 year for complete physical exam.     Health Maintenance Due   Topic Date Due     ZOSTER IMMUNIZATION (2 of 3) 01/28/2016     ASTHMA ACTION PLAN  08/22/2017     ASTHMA CONTROL TEST  01/17/2018     PHQ-2  01/01/2019     BMP  08/03/2019     LIPID  08/03/2019     MICROALBUMIN  08/03/2019     FALL RISK ASSESSMENT  08/03/2019     MEDICARE ANNUAL WELLNESS VISIT  08/03/2019     PSA  08/03/2019     FIT  08/21/2019       End of Life Planning:  Patient currently has an advanced directive: Yes.  Practitioner is supportive of decision.    COUNSELING:  Reviewed preventive health counseling, as reflected in patient instructions    Estimated body mass index is 27.06 kg/m  as calculated from the following:    Height as of this encounter: 1.727 m (5' 8\").    Weight as of this encounter: 80.7 kg (178 lb).     reports that he has never smoked. He has never used smokeless tobacco.    Appropriate preventive services were discussed with this patient, including applicable screening as appropriate for cardiovascular disease, diabetes, osteopenia/osteoporosis, and glaucoma.  As appropriate for age/gender, discussed screening for colorectal cancer, prostate cancer, breast cancer, and cervical cancer. Checklist reviewing preventive services available has been given to the patient.    Reviewed patients plan of care and provided an AVS. The Basic Care Plan (routine screening as documented in Health Maintenance) for Alexander meets the Care Plan requirement. This Care Plan has been established and reviewed with the Patient.    Counseling Resources:  ATP IV Guidelines  Pooled Cohorts Equation Calculator  Breast Cancer Risk Calculator  FRAX " Risk Assessment  ICSI Preventive Guidelines  Dietary Guidelines for Americans, 2010  USDA's MyPlate  ASA Prophylaxis  Lung CA Screening    This document serves as a record of the services and decisions personally performed and made by Messi Saldana MD. It was created on his behalf by Omi Zambrano, a trained medical scribe. The creation of this document is based on the provider's statements to the medical scribe.  Omi Zambrano August 8, 2019 8:01 AM     The information in this document, created by the medical scribe for me, accurately reflects the services I personally performed and the decisions made by me. I have reviewed and approved this document for accuracy prior to leaving the patient care area.  August 8, 2019          Messi Saldana MD, FAAFP    [address]  [address] [phone] Fax

## 2019-08-08 NOTE — PATIENT INSTRUCTIONS
Recommend Shingrix vaccine for shingles. Check with insurance to see where the Shingrix vaccine is the cheapest. Follow up 2-6 months after receiving the first shot for the second shot.    [facility]                        To reach your care team during and after hours:   [phone]  To reach our pharmacy:        [phone]    Clinic Hours                        Our clinic hours are:    Monday   7:30 am to 7:00 pm                  Tuesday through Friday 7:30 am to 5:00 pm                             Saturday   8:00 am to 12:00 pm      Sunday   Closed      Pharmacy Hours                        Our pharmacy hours are:    Monday   8:30 am to 7:00 pm       Tuesday to Friday  8:30 am to 6:00 pm                       Saturday    9:00 am to 1:00 pm              Sunday    Closed              There is also information available at our web site:  [email or web address]    If your provider ordered any lab tests and you do not receive the results within 10 business days, please call the clinic.    If you need a medication refill please contact your pharmacy.  Please allow 2-3 business days for your refill to be completed.    Our clinic offers telephone visits and e visits.  Please ask one of your team members to explain more.      Use [employer] (secure email communication and access to your chart) to send your primary care provider a message or make an appointment. Ask someone on your Team how to sign up for [employer].  Immunizations                      Immunization History   Administered Date(s) Administered     Influenza (High Dose) 3 valent vaccine 10/31/2011, 01/08/2013, 09/24/2014, 12/03/2015, 10/11/2016, 11/09/2017     Influenza (IIV3) PF 11/01/2013     Influenza (intradermal) 10/10/2018     Pneumo Conj 13-V (2010&after) 06/15/2015     Pneumococcal 23 valent 10/31/2011     TD (ADULT, 7+) 01/01/2004     TDAP Vaccine (Boostrix) 06/21/2012     Zoster vaccine, live 12/03/2015        Health Maintenance                          Health Maintenance Due   Topic Date Due     Zoster (Shingles) Vaccine (2 of 3) 01/28/2016     Asthma Action Plan - yearly  08/22/2017     Asthma Control Test  01/17/2018     PHQ-2  01/01/2019     Basic Metabolic Panel  08/03/2019     Cholesterol Lab  08/03/2019     Kidney Microalbumin Urine Test  08/03/2019     FALL RISK ASSESSMENT  08/03/2019     Annual Wellness Visit  08/03/2019     Prostate Test  08/03/2019     FIT Test  08/21/2019       Patient Education   Personalized Prevention Plan  You are due for the preventive services outlined below.  Your care team is available to assist you in scheduling these services.  If you have already completed any of these items, please share that information with your care team to update in your medical record.  Health Maintenance Due   Topic Date Due     Zoster (Shingles) Vaccine (2 of 3) 01/28/2016     Asthma Action Plan - yearly  08/22/2017     Asthma Control Test  01/17/2018     PHQ-2  01/01/2019     Basic Metabolic Panel  08/03/2019     Cholesterol Lab  08/03/2019     Kidney Microalbumin Urine Test  08/03/2019     FALL RISK ASSESSMENT  08/03/2019     Annual Wellness Visit  08/03/2019     Prostate Test  08/03/2019     FIT Test  08/21/2019

## 2019-08-09 ASSESSMENT — ASTHMA QUESTIONNAIRES: ACT_TOTALSCORE: 25

## 2019-08-16 DIAGNOSIS — E78.5 HYPERLIPIDEMIA LDL GOAL <130: Primary | ICD-10-CM

## 2019-08-16 DIAGNOSIS — R73.03 PREDIABETES: ICD-10-CM

## 2019-08-20 DIAGNOSIS — Z12.11 SCREEN FOR COLON CANCER: ICD-10-CM

## 2019-08-20 DIAGNOSIS — Z00.00 ENCOUNTER FOR ROUTINE ADULT HEALTH EXAMINATION WITHOUT ABNORMAL FINDINGS: ICD-10-CM

## 2019-08-20 PROCEDURE — 82274 ASSAY TEST FOR BLOOD FECAL: CPT | Performed by: FAMILY MEDICINE

## 2019-08-22 ENCOUNTER — HOSPITAL ENCOUNTER (OUTPATIENT)
Dept: RESPIRATORY THERAPY | Facility: CLINIC | Age: 74
Discharge: HOME | End: 2019-08-22
Attending: FAMILY MEDICINE | Admitting: FAMILY MEDICINE
Payer: COMMERCIAL

## 2019-08-22 DIAGNOSIS — R05.3 CHRONIC COUGH: ICD-10-CM

## 2019-08-22 DIAGNOSIS — J45.20 MILD INTERMITTENT ASTHMA WITHOUT COMPLICATION: ICD-10-CM

## 2019-08-22 LAB
DLCOCOR-%PRED-PRE: 126 %
DLCOCOR-PRE: 28.91 ML/MIN/MMHG
DLCOUNC-%PRED-PRE: 126 %
DLCOUNC-PRE: 28.74 ML/MIN/MMHG
DLCOUNC-PRED: 22.79 ML/MIN/MMHG
ERV-%PRED-PRE: 44 %
ERV-PRE: 0.33 L
ERV-PRED: 0.74 L
EXPTIME-PRE: 7.3 SEC
FEF2575-%PRED-PRE: 91 %
FEF2575-PRE: 1.95 L/SEC
FEF2575-PRED: 2.12 L/SEC
FEFMAX-%PRED-PRE: 99 %
FEFMAX-PRE: 7.25 L/SEC
FEFMAX-PRED: 7.28 L/SEC
FEV1-%PRED-PRE: 100 %
FEV1-PRE: 2.78 L
FEV1FEV6-PRE: 72 %
FEV1FEV6-PRED: 77 %
FEV1FVC-PRE: 72 %
FEV1FVC-PRED: 76 %
FEV1SVC-PRE: 65 %
FEV1SVC-PRED: 68 %
FIFMAX-PRE: 6.09 L/SEC
FRCPLETH-%PRED-PRE: 99 %
FRCPLETH-PRE: 3.49 L
FRCPLETH-PRED: 3.52 L
FVC-%PRED-PRE: 105 %
FVC-PRE: 3.86 L
FVC-PRED: 3.66 L
GAW-%PRED-PRE: 55 %
GAW-PRE: 0.57 L/S/CMH2O
GAW-PRED: 1.03 L/S/CMH2O
HEMOCCULT STL QL IA: NEGATIVE
IC-%PRED-PRE: 118 %
IC-PRE: 3.95 L
IC-PRED: 3.34 L
RVPLETH-%PRED-PRE: 122 %
RVPLETH-PRE: 3.16 L
RVPLETH-PRED: 2.59 L
SGAW-%PRED-PRE: 180 %
SGAW-PRE: 0.15 1/CMH2O*S
SGAW-PRED: 0.08 1/CMH2O*S
SRAW-%PRED-PRE: 140 %
SRAW-PRE: 6.68 CMH2O*S
SRAW-PRED: 4.76 CMH2O*S
TLCPLETH-%PRED-PRE: 115 %
TLCPLETH-PRE: 7.44 L
TLCPLETH-PRED: 6.42 L
VA-%PRED-PRE: 112 %
VA-PRE: 6.3 L
VC-%PRED-PRE: 104 %
VC-PRE: 4.28 L
VC-PRED: 4.08 L

## 2019-08-22 PROCEDURE — 94729 DIFFUSING CAPACITY: CPT

## 2019-08-22 PROCEDURE — 94726 PLETHYSMOGRAPHY LUNG VOLUMES: CPT

## 2019-08-22 PROCEDURE — 94010 BREATHING CAPACITY TEST: CPT

## 2019-08-22 NOTE — RESULT ENCOUNTER NOTE
Rupert  I have reviewed your recent labs. Here are the results:    -FIT test (screening test for colon cancer) was normal. ADVISE: rechecking this test in 1 year.     If you have any questions please do not hesitate to contact our office via phone ([phone]) or [employer].    Toshia Saba, MS, PA-C  [facility]

## 2019-08-22 NOTE — PROGRESS NOTES
PFT Note: Patient completed pulmonary function testing with spirometry, lung volumes and diffusion. Good patient effort and cooperation. The results of this test met the ATS standards for acceptability and repeatability. Hgb result of 14.4 was used from 8/8/2019 for DLCO.

## 2019-08-24 NOTE — TELEPHONE ENCOUNTER
Reason for Disposition   General information question, no triage required and triager able to answer question    Protocols used: INFORMATION ONLY CALL-A-AH    Question regarding scheduled procedure. Pt confirming instructions given to her by Nurse/Staff today.   Pt Is not on Flonase for asthma ok to fill     Pt due for BP check for further refills     Please call pt to schedule this     Thank you       Dianna Geronimo RN, BSN  [place] [facility]

## 2019-08-26 NOTE — PROCEDURES
Procedure Date: 2019      Please see medical chart for graphs and statistics related to this report.       REFERRING PHYSICIAN:   Messi Saldana                                        TECHNICIAN:   Heike Lim      DIAGNOSIS:   Chronic cough, AMBER                                                                 HEIGHT:   66.50 inches                                                                    WEIGHT:   177.00 Lbs.      DYSPNEA:   After any exertion                                                                COUGH:   Non-Productive   WHEEZE:   Rare                                                                      TOBACCO PROD:   Never smoked      MEDICATIONS:   Albuterol prn       POST-TEST COMMENTS:   Good patient effort and cooperation.  The results of testing meet ATS criteria for acceptability and repeatability.  Current hemoglobin unavailable for correction of the DLCO, results from 2019 used for DLCO.         INTERPRETATION:      1.  Normal mechanics   2.  Air trapping   3.  Hyperinflation   4.  Normal gas transfer         LANA GOODRICH MD             D: 2019   T: 2019   MT: GALILEA      Name:     TONY LEI   MRN:      [id number]        Account:        [id number]   :      1945           Procedure Date: 2019      Document: [id number]       [facility]        Messi Saldana MD

## 2019-08-27 DIAGNOSIS — R05.3 CHRONIC COUGH: Primary | ICD-10-CM

## 2019-08-27 NOTE — PROGRESS NOTES
Pulmonary medicine referral placed for follow up, eval/treat chronic cough per   Message handled by Nurse Triage with [facility] - provider name: ERIC Gibson, SHEKHAR   [place] [facility]

## 2019-11-06 ENCOUNTER — TRANSFERRED RECORDS (OUTPATIENT)
Dept: HEALTH INFORMATION MANAGEMENT | Facility: CLINIC | Age: 74
End: 2019-11-06

## 2019-11-20 ENCOUNTER — TRANSFERRED RECORDS (OUTPATIENT)
Dept: HEALTH INFORMATION MANAGEMENT | Facility: CLINIC | Age: 74
End: 2019-11-20

## 2019-12-04 ENCOUNTER — TRANSFERRED RECORDS (OUTPATIENT)
Dept: HEALTH INFORMATION MANAGEMENT | Facility: CLINIC | Age: 74
End: 2019-12-04

## 2019-12-04 ENCOUNTER — TRANSCRIBE ORDERS (OUTPATIENT)
Dept: OTHER | Age: 74
End: 2019-12-04

## 2019-12-04 DIAGNOSIS — R29.898 LEFT LEG WEAKNESS: Primary | ICD-10-CM

## 2019-12-10 ENCOUNTER — PRE VISIT (OUTPATIENT)
Dept: NEUROLOGY | Facility: CLINIC | Age: 74
End: 2019-12-10

## 2019-12-10 NOTE — TELEPHONE ENCOUNTER
FUTURE VISIT INFORMATION      FUTURE VISIT INFORMATION:    Date: 1/27/2020    Time: 8AM    [facility]  REFERRAL INFORMATION:    Referring provider:  Dr. Camacho     Referring providers clinic:  [facility]     Reason for visit/diagnosis  Left leg Weakness     RECORDS REQUESTED FROM:       Clinic name Comments Records Status Imaging Status   [facility]  Care Everywhere N/A    [facility]  Requested most recent EMG and Imaging to be faxed ASAP  Scanned to Media Tab  Requested                              Action 2/4/20 MV 10am   Action Taken EMG report received from N via mail. Sent to scanning

## 2020-01-27 ENCOUNTER — OFFICE VISIT (OUTPATIENT)
Dept: NEUROLOGY | Facility: CLINIC | Age: 75
End: 2020-01-27
Payer: COMMERCIAL

## 2020-01-27 VITALS
BODY MASS INDEX: 27.34 KG/M2 | DIASTOLIC BLOOD PRESSURE: 91 MMHG | HEIGHT: 68 IN | SYSTOLIC BLOOD PRESSURE: 161 MMHG | HEART RATE: 63 BPM | WEIGHT: 180.4 LBS | RESPIRATION RATE: 16 BRPM | OXYGEN SATURATION: 96 %

## 2020-01-27 DIAGNOSIS — M54.10 POLYRADICULOPATHY: ICD-10-CM

## 2020-01-27 DIAGNOSIS — R29.898 LEFT LEG WEAKNESS: Primary | ICD-10-CM

## 2020-01-27 ASSESSMENT — MIFFLIN-ST. JEOR: SCORE: 1537.79

## 2020-01-27 ASSESSMENT — PAIN SCALES - GENERAL: PAINLEVEL: NO PAIN (0)

## 2020-01-27 NOTE — PROGRESS NOTES
Service Date: 2020      Víctor Camacho MD   [facility]   [address], [facility] [address]   [place]  [address]      RE: Alexander Alfred   MRN: [id number]   : 1945      Dear Dr. Camacho:      Thank you for referring Alexander Alfred.  He is a 74-year-old male here for an opinion concerning the possibility of motor neuron disease.  His major complaint is inability to plantar flex his left foot.      The patient in  developed low back pain and noted at that time he was unable to plantar flex his left foot.  He describes having weakness in his left leg and calf.  He did see Dr. Knight concerning this and ultimately had lumbar spinal surgery done.  This was a fusion at L3-4.  He tells me the plantar flexor weakness improved after the surgery as did his back pain.      In , he again began experiencing low back pain but not a lot of pain in his leg.  Following this he had a decompression from T12-L1 and he indicates his symptoms did improve.      In 2018, he began experiencing more low back pain but no leg pain per se.  He again noted weakness with left ankle plantar flexion.      I did have access to a lumbar MRI scan done on 2018.  It looked like Dr. Ramin Knight ordered this.  It was done at [facility].  There are no specific findings that might account for left S1 and S2 radiculopathy.  There was some foraminal stenosis of a moderate degree on the left at L5-S1.  There was no high-grade central canal stenosis that I can see.  There is evidence of prior spinal fusion at L3-L4.      He subsequently had EMG examination done on 2019 that I reviewed.  It did show multilevel lower extremity changes, more pronounced on the left than on the right.  He had normal sensory nerve potentials.  He had chronic and active denervation in LEFT  L5 and S1 innervated muscles, although the gluteus medius was spared.  There were also some chronic changes in the left vastus lateralis and  tibialis anterior.  There were also some chronic changes in the right lower extremity muscles that were studied but no active denervation.  A limited study of the left upper extremity (abductor pollicis brevis and first dorsal interosseous) did not reveal any abnormalities.      Overall, the findings were most suggestive of a polyradiculopathy, which was worse on the left.  It was not indicative of a polyneuropathy.  It was noted that similar findings could be seen in a primary motor process such as motor neuron disease.      The patient tells me he has since had a brain MRI scan, possibly another lumbar MRI scan and MRI of his left leg done at your clinic.  I do not have those results but I am trying to get them.      I do note that one of your concerns was the possibility of motor neuron disease accounting for this isolated left leg weakness.  He really does not describe more widespread symptomatology.  Specifically, he denies any right lower extremity or upper extremity symptoms.  He denies any bulbar symptoms.  He has not appreciated any fasciculations.  He has not appreciated any cramps.  He reports some low back stiffness but no pain in the legs any longer.  He denies any sensory symptoms.      PAST MEDICAL HISTORY:  Notable for hypertension and sleep apnea.      CURRENT MEDICATIONS:   1.  Albuterol.   2.  Aspirin 81 mg.   3.  Coenzyme Q.   4.  Flonase.   5.  Losartan/hydrochlorothiazide.   6.  Probiotic.      ALLERGIES:  Codeine, hydrocodone and oxycodone.      SOCIAL HISTORY:  He is retired.  He does not smoke and has 1 alcoholic drink a day.      FAMILY HISTORY:  There is no family history of neuromuscular disease that he is aware of.      PHYSICAL EXAMINATION:  Examination reveals the patient's heart rate is 63.  Blood pressure 161/91.      Pupils are equal, round and react well to light.  He has full extraocular motility.  Facial strength is normal.  His speech is clear.  Palate moves in the midline.   Tongue is normal.  No atrophy or fasciculation of the tongue is appreciated.      Motor examination reveals normal neck strength, upper extremity strength and right lower extremity strength.      He has restricted weakness of left ankle plantar flexion in the 4 range.  He has difficulty toe walking on the left.  Otherwise, he has excellent left lower extremity strength.  He does have atrophy of the left calf.  No fasciculations are appreciated.      Sensory examination is notable only for a relative decrease in vibratory sense of the great toe on the left.  Cerebellar function is intact.  Gait is notable for a slight difficulty pushing off of the left foot.      He is generally hyporeflexic with absent ankle jerks.  Plantar responses are flexor.      IMPRESSION:   1.  Left calf atrophy with plantar flexion weakness.   2.  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   3.  No findings or symptoms suggesting more widespread motor neuron disease on examination today.      PLAN:  I advised the patient I suspect the weakness in his left ankle plantar flexion is part of a polyradiculopathy.  He has had 2 spinal surgeries.  I do not think he has motor neuron disease.      I did tell him I would attempt to get imaging studies done in your clinic for my review.  I will contact him when I have had a chance to do so.     ADDENDUM 1/31/20: Received additional imaging reports from [facility] and discussed with patient. He had MRI s of lumbosacral plexus and thighs/sciatic nerves. Both studies negative. He also had normal head MRI. At this point nothing additional to recommend other than follow up with either Dr Camacho or me if worsening or new weakness.      Sincerely,      Aquiles Zuñiga MD      cc:   [place] [address]         AQUILES ZUÑIGA MD             D: 01/27/2020   T: 01/27/2020   MT: nic      Name:     TONY LEI   MRN:       [id number]        Account:      [id number]   :      1945           Service Date: 2020      Document: [id number]

## 2020-01-27 NOTE — LETTER
2020       RE: Alexander Alfred  [address] [facility] [address]     Dear Colleague,    Thank you for referring your patient, Alexander Alfred, to the [facility] at [facility] Please see a copy of my visit note below.    Service Date: 2020      Víctor Camacho MD   [facility]   [address] [facility]   [place]  [address]      RE: Alexander Alfred   MRN: [id number]   : 1945      Dear Dr. Camacho:      Thank you for referring Alexander Alfred.  He is a 74-year-old male here for an opinion concerning the possibility of motor neuron disease.  His major complaint is inability to plantar flex his left foot.      The patient in  developed low back pain and noted at that time he was unable to plantar flex his left foot.  He describes having weakness in his left leg and calf.  He did see Dr. Knight concerning this and ultimately had lumbar spinal surgery done.  This was a fusion at L3-4.  He tells me the plantar flexor weakness improved after the surgery as did his back pain.      In , he again began experiencing low back pain but not a lot of pain in his leg.  Following this he had a decompression from T12-L1 and he indicates his symptoms did improve.      In 2018, he began experiencing more low back pain but no leg pain per se.  He again noted weakness with left ankle plantar flexion.      I did have access to a lumbar MRI scan done on 2018.  It looked like Dr. Ramin Knight ordered this.  It was done at [facility].  There are no specific findings that might account for left S1 and S2 radiculopathy.  There was some foraminal stenosis of a moderate degree on the left at L5-S1.  There was no high-grade central canal stenosis that I can see.  There is evidence of prior spinal fusion at L3-L4.      He subsequently had EMG examination done on 2019 that I reviewed.  It did show multilevel lower extremity changes, more  pronounced on the left than on the right.  He had normal sensory nerve potentials.  He had chronic and active denervation in LEFT  L5 and S1 innervated muscles, although the gluteus medius was spared.  There were also some chronic changes in the left vastus lateralis and tibialis anterior.  There were also some chronic changes in the right lower extremity muscles that were studied but no active denervation.  A limited study of the left upper extremity (abductor pollicis brevis and first dorsal interosseous) did not reveal any abnormalities.      Overall, the findings were most suggestive of a polyradiculopathy, which was worse on the left.  It was not indicative of a polyneuropathy.  It was noted that similar findings could be seen in a primary motor process such as motor neuron disease.      The patient tells me he has since had a brain MRI scan, possibly another lumbar MRI scan and MRI of his left leg done at your clinic.  I do not have those results but I am trying to get them.      I do note that one of your concerns was the possibility of motor neuron disease accounting for this isolated left leg weakness.  He really does not describe more widespread symptomatology.  Specifically, he denies any right lower extremity or upper extremity symptoms.  He denies any bulbar symptoms.  He has not appreciated any fasciculations.  He has not appreciated any cramps.  He reports some low back stiffness but no pain in the legs any longer.  He denies any sensory symptoms.      PAST MEDICAL HISTORY:  Notable for hypertension and sleep apnea.      CURRENT MEDICATIONS:   1.  Albuterol.   2.  Aspirin 81 mg.   3.  Coenzyme Q.   4.  Flonase.   5.  Losartan/hydrochlorothiazide.   6.  Probiotic.      ALLERGIES:  Codeine, hydrocodone and oxycodone.      SOCIAL HISTORY:  He is retired.  He does not smoke and has 1 alcoholic drink a day.      FAMILY HISTORY:  There is no family history of neuromuscular disease that he is aware of.       PHYSICAL EXAMINATION:  Examination reveals the patient's heart rate is 63.  Blood pressure 161/91.      Pupils are equal, round and react well to light.  He has full extraocular motility.  Facial strength is normal.  His speech is clear.  Palate moves in the midline.  Tongue is normal.  No atrophy or fasciculation of the tongue is appreciated.      Motor examination reveals normal neck strength, upper extremity strength and right lower extremity strength.      He has restricted weakness of left ankle plantar flexion in the 4 range.  He has difficulty toe walking on the left.  Otherwise, he has excellent left lower extremity strength.  He does have atrophy of the left calf.  No fasciculations are appreciated.      Sensory examination is notable only for a relative decrease in vibratory sense of the great toe on the left.  Cerebellar function is intact.  Gait is notable for a slight difficulty pushing off of the left foot.      He is generally hyporeflexic with absent ankle jerks.  Plantar responses are flexor.      IMPRESSION:   1.  Left calf atrophy with plantar flexion weakness.   2.  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   3.  No findings or symptoms suggesting more widespread motor neuron disease on examination today.      PLAN:  I advised the patient I suspect the weakness in his left ankle plantar flexion is part of a polyradiculopathy.  He has had 2 spinal surgeries.  I do not think he has motor neuron disease.      I did tell him I would attempt to get imaging studies done in your clinic for my review.  I will contact him when I have had a chance to do so.      Sincerely,      Aquiles Ortiz MD      cc:   Messi Saldana MD   [address]  [address]                  D: 2020   T: 2020   MT: nic      Name:     TONY LEI   MRN:      [id number]        Account:      [id number]   :      1945           Service Date: 2020      Document:  [id number]

## 2020-02-07 NOTE — PROGRESS NOTES
SUBJECTIVE:   Alexander Alfred is a 72 year old male who presents for Preventive Visit.    Are you in the first 12 months of your Medicare Part B coverage?  No    Healthy Habits:    Do you get at least three servings of calcium containing foods daily (dairy, green leafy vegetables, etc.)? yes    Amount of exercise or daily activities, outside of work: 6 day(s) per week    Problems taking medications regularly No    Medication side effects: No    Have you had an eye exam in the past two years? yes    Do you see a dentist twice per year? yes    Do you have sleep apnea, excessive snoring or daytime drowsiness?yes-- CPAP machine     COGNITIVE SCREEN  1) Repeat 3 items (Banana, Sunrise, Chair)    2) Clock draw: NORMAL  3) 3 item recall: Recalls 3 objects  Results: 3 items recalled: COGNITIVE IMPAIRMENT LESS LIKELY    Mini-CogTM Copyright S Carlie. Licensed by the author for use in [facility]; reprinted with permission ([email or web address]). All rights reserved.      Hypertension Follow-up    Outpatient blood pressures are not being checked.    Low Salt Diet: no added salt    Losartan-HCTZ 50-12.5 mg.     BP Readings from Last 3 Encounters:   07/17/17 128/66   10/11/16 130/58   08/26/16 164/82       Nasal Polyps -- Present in sinuses, he feels improved with use of Flonase.     Back Pain -- No concerns.     Asthma -- Albuterol inhaler every 6 hours as needed, Flonase 2 sprays each nostril daily. ACT score 25.     Eosinophilic Esophagitis -- He reported that he periodic issues with sensations of food getting caught, but this is unchanged since onset.     Lipids -- Aspirin 81 mg.     Recent Labs   Lab Test  08/22/16   0939  06/15/15   1002  06/26/13   1041   CHOL  177  191  176   HDL  56  58  52   LDL  84  105  94   TRIG  185*  140  150   CHOLHDLRATIO   --   3.3  3.4     Stool urgency once daily - normal brown stool - has to go within a few minutes - then good for the rest of the day.    Past/recent records reviewed and  discussed for -- family hx, social hx, surgical hx, medications, immunizations, allergies.      Reviewed and updated as needed this visit by clinical staff  Tobacco  Allergies  Meds  Med Hx  Surg Hx  Fam Hx  Soc Hx      Reviewed and updated as needed this visit by Provider  Allergies        Social History   Substance Use Topics     Smoking status: Never Smoker     Smokeless tobacco: Never Used     Alcohol use 7.0 oz/week     14 Standard drinks or equivalent per week      Comment: 2 drinks per day avg       The patient does not drink >3 drinks per day nor >7 drinks per week.    Today's PHQ-2 Score:   PHQ-2 ( 1999 Pfizer) 7/17/2017 8/22/2016   Q1: Little interest or pleasure in doing things 0 0   Q2: Feeling down, depressed or hopeless 0 0   PHQ-2 Score 0 0       Do you feel safe in your environment - Yes    Do you have a Health Care Directive?: No: Advance care planning reviewed with patient; information given to patient to review.    Current providers sharing in care for this patient include:   Patient Care Team:  Messi Saldana MD as PCP - General      Hearing impairment: Yes, has ringing in right ear    Ability to successfully perform activities of daily living: Yes, no assistance needed     Fall risk:  Fallen 2 or more times in the past year?: No  Any fall with injury in the past year?: No    Home safety:  none identified      The following health maintenance items are reviewed in Epic and correct as of today:  Health Maintenance   Topic Date Due     ASTHMA CONTROL TEST Q6 MOS  02/22/2017     ASTHMA ACTION PLAN Q1 YR  08/22/2017     FALL RISK ASSESSMENT  08/22/2017     LIPID MONITORING Q1 YEAR  08/22/2017     MICROALBUMIN Q1 YEAR  08/22/2017     PSA Q1 YR  08/22/2017     INFLUENZA VACCINE (SYSTEM ASSIGNED)  09/01/2017     BMP Q1 YR  10/11/2017     WELLNESS VISIT Q1 YR  02/03/2018     COLONOSCOPY Q10 YR  01/01/2020     ADVANCE DIRECTIVE PLANNING Q5 YRS  08/22/2021     TETANUS Q10 YR  06/21/2022      PNEUMOCOCCAL  Completed     AORTIC ANEURYSM SCREENING (SYSTEM ASSIGNED)  Completed     HEPATITIS C SCREENING  Completed     Health Maintenance     Colonoscopy:  10 years, due 8/23 (last 8/13)   FIT:  Yearly, due              PSA:  Yearly, due (last 8/16)   DEXA:  n/a    Health Maintenance Due   Topic Date Due     ASTHMA CONTROL TEST Q6 MOS  02/22/2017       Current Problem List    Patient Active Problem List   Diagnosis     Nasal polyps     Asthma, mild intermittent     Sleep apnea     Hyperlipidemia LDL goal <130     Hypertension goal BP (blood pressure) < 140/90     EE (eosinophilic esophagitis)     Osteoarthritis of left hip     Lumbar facet arthropathy     Lumbar degenerative disc disease     Lumbar pain     Left lumbosacral radiculopathy     Spondylolisthesis of lumbar region     Lumbar foraminal stenosis     Central spinal stenosis     Spinal stenosis of lumbar region at multiple levels     Acquired spondylolisthesis     Closed traumatic nondisplaced fracture of distal end of left fibula     Left ankle pain     Ankle fracture     Advanced directives, counseling/discussion       Past Medical History    Past Medical History:   Diagnosis Date     Asthma, mild intermittent     [place] asthma     EE (eosinophilic esophagitis) 8/13    Dr Ramirez     Hyperlipidemia LDL goal <130      Hypertension goal BP (blood pressure) < 140/90      Nasal polyps 2004    approx 2/year - increased sx from nasal polyps - prednisone 40 mg x 2 days, 20 mg x 2 days, 10mg x 10 days with excellent results     Other chronic pain      Sleep apnea 2006    moderate - CPAP       Past Surgical History    Past Surgical History:   Procedure Laterality Date     COLONOSCOPY  2010    normal - due 2020     DECOMPRESSION, FUSION LUMBAR POSTERIOR THREE + LEVELS, COMBINED N/A 10/7/2014    Dr Knight - COMBINED DECOMPRESSION, FUSION LUMBAR POSTERIOR THREE + LEVELS     ESOPHAGOSCOPY, GASTROSCOPY, DUODENOSCOPY (EGD), COMBINED  8/7/2013    Dr Ramirez  - EE, non erosive gastropathy     ROTATOR CUFF REPAIR RT/LT  2005    Lt shoulder - dr armaan lazar     ROTATOR CUFF REPAIR RT/LT  5/09    Lt shoulder - dr armaan lazar     stress echo  7/11    normal     STRESS ECHO ([facility])  6/15    normal stress echo     SURGICAL HISTORY OF -   1982    Lt ankle CRIF     SURGICAL HISTORY OF -   4/08    blephoplasty     SURGICAL HISTORY OF -   11/16    T12-L1 - Decompression - Dr Knight -      TONSILLECTOMY  1950       Current Medications    Current Outpatient Prescriptions   Medication Sig Dispense Refill     losartan-hydrochlorothiazide (HYZAAR) 50-12.5 MG per tablet Take 1 tablet by mouth daily 90 tablet 3     albuterol (PROAIR HFA/PROVENTIL HFA/VENTOLIN HFA) 108 (90 BASE) MCG/ACT Inhaler Inhale 2 puffs into the lungs every 6 hours as needed for shortness of breath / dyspnea or wheezing 3 Inhaler 3     fluticasone (FLONASE) 50 MCG/ACT spray Spray 2 sprays into both nostrils daily 48 g 3     aspirin 81 MG chewable tablet Take 1 tablet (81 mg) by mouth daily 108 tablet 3     meclizine (ANTIVERT) 25 MG tablet Take 1 tablet (25 mg) by mouth every 6 hours as needed for dizziness 30 tablet 1     Coenzyme Q10 (COQ-10) 100 MG CAPS Take 100 mg by mouth daily       KRILL OIL PO Take 1 capsule by mouth daily Taking MegaRed OTC. Strength unknown.       Multiple Vitamin (MULTIVITAMIN OR) Take 1 tablet by mouth daily        [DISCONTINUED] losartan-hydrochlorothiazide (HYZAAR) 50-12.5 MG per tablet Take 1 tablet by mouth daily 90 tablet 3     [DISCONTINUED] albuterol (PROAIR HFA, PROVENTIL HFA, VENTOLIN HFA) 108 (90 BASE) MCG/ACT inhaler Inhale 2 puffs into the lungs every 6 hours as needed for shortness of breath / dyspnea or wheezing 1 Inhaler 1       Allergies    No Known Allergies    Immunizations    Immunization History   Administered Date(s) Administered     Influenza (High Dose) 3 valent vaccine 10/31/2011, 01/08/2013, 09/24/2014, 12/03/2015, 10/11/2016     Influenza (IIV3) 11/01/2013  "    Pneumococcal (PCV 13) 06/15/2015     Pneumococcal 23 valent 10/31/2011     TD (ADULT, 7+) 01/01/2004     TDAP Vaccine (Boostrix) 06/21/2012     Zoster vaccine, live 12/03/2015       Family History    Family History   Problem Relation Age of Onset     HEART DISEASE Father      Hypertension Father      C.A.D. Father      smoker     C.A.D. Other      cousin - at 45     HEART DISEASE Maternal Grandfather      Hypertension Maternal Grandfather      C.A.D. Maternal Grandfather      Coronary Artery Disease Maternal Uncle      Coronary Artery Disease Maternal Uncle        Social History    Social History     Social History     Marital status:      Spouse name: Valeria     Number of children: 3     Years of education: 16     Occupational History      [employer]     Social History Main Topics     Smoking status: Never Smoker     Smokeless tobacco: Never Used     Alcohol use 7.0 oz/week     14 Standard drinks or equivalent per week      Comment: 2 drinks per day avg     Drug use: No     Sexual activity: Yes     Partners: Female     Birth control/ protection: Rhythm, Male Surgical     Other Topics Concern     Parent/Sibling W/ Cabg, Mi Or Angioplasty Before 65f 55m? 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    This document serves as a record of the services and decisions personally performed and made by Messi Saldana MD [facility]. It was created on their behalf by Corona Marion, a trained medical scribe. The creation of this document is based the provider's statements to the medical scribe.  Corona Marion July 17, 2017 2:10 PM    OBJECTIVE:   /66  Pulse 62  Temp 97.8  F (36.6  C) (Oral)  Ht 5' 8\" (1.727 m)  Wt 169 lb (76.7 kg)  SpO2 94%  BMI 25.7 " "kg/m2 Estimated body mass index is 25.7 kg/(m^2) as calculated from the following:    Height as of this encounter: 5' 8\" (1.727 m).    Weight as of this encounter: 169 lb (76.7 kg).  EXAM:   GENERAL: healthy, alert and no distress  EYES: Eyes grossly normal to inspection, PERRL and conjunctivae and sclerae normal  HENT: ear canals and TM's normal upon viewing with otoscope, nose and mouth without ulcers or lesions upon viewing with otoscope  RESP: lungs clear to auscultation - no rales, rhonchi or wheezes  CV: regular rate and rhythm, normal S1 S2, no S3 or S4, no murmur, click or rub, no peripheral edema and peripheral pulses strong  ABDOMEN: soft, nontender, no hepatosplenomegaly, no masses and bowel sounds normal   (male): normal male genitalia without lesions or urethral discharge, no hernia  RECTAL: normal sphincter tone, no rectal masses, prostate 1-2+ in size, smooth, nontender without nodules or masses  MS: no gross musculoskeletal defects noted, no edema  SKIN: no suspicious lesions or rashes to visible skin  NEURO: mentation intact and speech normal  PSYCH: mentation appears normal, affect normal/bright    ASSESSMENT / PLAN:       ICD-10-CM    1. Encounter for routine adult health examination without abnormal findings Z00.00 Comprehensive metabolic panel     Lipid panel reflex to direct LDL     CK total     CBC with platelets     Prostate spec antigen screen     Albumin Random Urine Quantitative     *UA reflex to Microscopic and Culture ([place] [facility] (except [place] and [place])     Fecal colorectal cancer screen (FIT)   2. Irritable bowel syndrome without diarrhea K58.9    3. Hypertension goal BP (blood pressure) < 140/90 I10 Comprehensive metabolic panel     Albumin Random Urine Quantitative     *UA reflex to Microscopic and Culture ([place] [facility] (except [place] and [place])     losartan-hydrochlorothiazide (HYZAAR) 50-12.5 MG per tablet   4. Hyperlipidemia LDL goal " <130 E78.5 Comprehensive metabolic panel     Lipid panel reflex to direct LDL     CK total   5. Obstructive sleep apnea syndrome G47.33 fluticasone (FLONASE) 50 MCG/ACT spray   6. Mild intermittent asthma without complication J45.20 albuterol (PROAIR HFA/PROVENTIL HFA/VENTOLIN HFA) 108 (90 BASE) MCG/ACT Inhaler   7. Nasal polyps J33.9 fluticasone (FLONASE) 50 MCG/ACT spray   8. EE (eosinophilic esophagitis) K20.0 CBC with platelets   9. Spinal stenosis of lumbar region at multiple levels M48.06    10. Lumbar foraminal stenosis M99.83    11. Spondylolisthesis of lumbar region M43.16    12. Lumbar degenerative disc disease M51.36    13. Lumbar facet arthropathy M12.88    14. Screening for prostate cancer Z12.5 Prostate spec antigen screen   15. Screen for colon cancer Z12.11 Fecal colorectal cancer screen (FIT)   16. Medication monitoring encounter Z51.81 Comprehensive metabolic panel     Lipid panel reflex to direct LDL     CK total     CBC with platelets     Albumin Random Urine Quantitative     *UA reflex to Microscopic and Culture ([place] [facility] (except [place] and [place])     Discussed treatment/modality options, including risk and benefits, he desires advised alcohol consumption 1oz per day or less, advised aspirin 81 mg po daily, advised 1 multivitamin per day, advised calcium 2934-3723 mg/d and Vitamin D 800-1200 IU/d, advised dentist every 6 months, advised diet and exercise, advised opthalmologist every 1-2 years, advised self testicular exam q month, further diagnostic(s), further health care maintenance, further lab(s), medication refill(s), OTC meds and observation. All diagnosis above reviewed and noted above, otherwise stable.  [facility] for further details.  Follow up in 1 year(s) and as needed.    Health Maintenance Due   Topic Date Due     ASTHMA CONTROL TEST Q6 MOS  02/22/2017     End of Life Planning:  Patient currently has an advanced directive: none on  "file    COUNSELING:  Reviewed preventive health counseling, as reflected in patient instructions    Estimated body mass index is 25.7 kg/(m^2) as calculated from the following:    Height as of this encounter: 5' 8\" (1.727 m).    Weight as of this encounter: 169 lb (76.7 kg).     reports that he has never smoked. He has never used smokeless tobacco.    Appropriate preventive services were discussed with this patient, including applicable screening as appropriate for cardiovascular disease, diabetes, osteopenia/osteoporosis, and glaucoma.  As appropriate for age/gender, discussed screening for colorectal cancer, prostate cancer, breast cancer, and cervical cancer. Checklist reviewing preventive services available has been given to the patient.    Reviewed patients plan of care and provided an AVS. The Basic Care Plan (routine screening as documented in Health Maintenance) for Alexander meets the Care Plan requirement. This Care Plan has been established and reviewed with the Patient.    Counseling Resources:  ATP IV Guidelines  Pooled Cohorts Equation Calculator  Breast Cancer Risk Calculator  FRAX Risk Assessment  ICSI Preventive Guidelines  Dietary Guidelines for Americans, 2010  USDA's MyPlate  ASA Prophylaxis  Lung CA Screening    The information in this document, created by the medical scribe for me, accurately reflects the services I personally performed and the decisions made by me. I have reviewed and approved this document for accuracy.   Messi Saldana MD FAAFP            Messi Saldana MD, FAAFP    [address]  [phone] Fax    " Not Applicable

## 2020-02-16 ENCOUNTER — HEALTH MAINTENANCE LETTER (OUTPATIENT)
Age: 75
End: 2020-02-16

## 2020-06-03 ENCOUNTER — VIRTUAL VISIT (OUTPATIENT)
Dept: FAMILY MEDICINE | Facility: CLINIC | Age: 75
End: 2020-06-03
Payer: COMMERCIAL

## 2020-06-03 DIAGNOSIS — Z20.828 CONTACT WITH AND (SUSPECTED) EXPOSURE TO OTHER VIRAL COMMUNICABLE DISEASES: Primary | ICD-10-CM

## 2020-06-03 PROCEDURE — 99213 OFFICE O/P EST LOW 20 MIN: CPT | Mod: 95 | Performed by: PHYSICIAN ASSISTANT

## 2020-06-03 NOTE — PATIENT INSTRUCTIONS
Patient Education     Understanding COVID-19 (Coronavirus Disease 2019)  COVID-19 is an illness of the lungs. It causes fever, coughing and trouble breathing. The illness is caused by a new virus in the coronavirus family called SARS-CoV-2.  First seen in late 2019, this virus has spread to many cities and countries around the world. Scientists are working to understand it better.   For the latest information, visit the CDC website at www.cdc.gov/coronavirus/2019-ncov. Or call 600-EYK-ZTBV ([phone]).  How does COVID-19 spread?   The virus seems to spread and infect people fairly easily. It may spread by:    Breathing in droplets of fluid that someone coughs or sneezes into the air.    Touching your eyes, nose or mouth after touching an infected surface. (The virus can live on handles, objects, and other surfaces.)  What are the symptoms of COVID-19?  Some people have no symptoms or only mild symptoms. Symptoms can vary from person to person. As experts learn more about COVID-19, new symptoms are being reported.  Symptoms may appear 2 to 14 days after contact with the virus. They include:    Fever    Coughing    Sore throat    Runny nose    Trouble breathing or feeling short of breath    Headache and body aches    Chills or repeated shaking with chills    Fatigue (feeling very tired)    Loss of appetite    Nausea or vomiting (feeling sick to your stomach or throwing up)    Diarrhea (loose, watery poops)    Abdominal (belly) pain    Loss of smell and taste  You can check your symptoms with the [facility] s Coronavirus Self-.  What are possible problems from COVID-19?  In many cases, this virus can cause infection (pneumonia) in both lungs. This can make a person very ill, and it can even cause death.  Your chances of severe illness are higher if:    You re an older adult    You have a serious health issue, such as heart or lung disease, diabetes or kidney disease    You have a health problem (or take a  medicine) that suppresses the immune system  Rarely, some children develop a severe problem called MIS-C. This is similar to [place] disease, which causes blood vessels and body organs to be inflamed. We don t yet know if MIS-C happens only in children, or if adults are also at risk. We also don t know if it's related to COVID-19--many children with MIS-C test positive for the virus, but not all.  Experts continue to study MIS-C. The [facility] has asked hospitals to report any person under 21 who has all of the following:    A fever over 100.4 F (38.0 C) for more than 24 hours and either a positive COVID-19 test or exposure to the virus in the last 4 weeks    Inflammation in at least 2 organs such as the heart, lungs or kidneys, with lab tests that show inflammation    No other diagnoses besides COVID-19 explain the child s symptoms  How is COVID-19 diagnosed?  Your care team will ask about your symptoms, recent travel and contact with sick people.  Not everyone will be tested for the virus. If you need to be tested, we will use a cotton swab to take a sample of cells from your nose and throat.    Antibody test (blood test). This test may show if you ve had the virus in the past--it won t tell us if you have it right now. (It takes a few weeks for the antibodies to show up in your blood). If you ve had the virus, you may have immunity (protection from the virus) in the future. We don t know yet how long you would be immune. Antibody tests aren t always accurate.    Sputum test (we may collect mucus that you have coughed up from your lungs)    Chest X-ray or CT scan  How is COVID-19 treated?  At this time, there is no medicine to prevent or treat COVID-19. Experts are testing different medicines, trying to find one that works.  So far, the most proven treatment is to support your body while it fights the virus.    Get extra rest to help your body fight the illness.    Drink extra fluids (6 to 8 glasses of liquids each  day), unless a doctor has told you not to. Ask your care team which fluids are best for you. Avoid fluids that contain caffeine or alcohol.    Take over-the-counter (OTC) medicine to reduce pain and fever. Your care team will tell you which medicine to use.  If you are very sick, you may need to stay in the hospital.    We may give you fluids through a vein to keep you hydrated (IV fluids).    To make sure your body gets enough oxygen, we may give you an oxygen mask or a breathing machine (ventilator).    We may turn you onto your stomach (called  prone positioning ). This will increase the oxygen in your lungs.  Those who have recovered from COVID-19 may be asked to donate plasma. (This is called COVID-19 convalescent plasma donation.) The plasma may contain antibodies to help fight the virus in others. Scientists are testing whether this might be a treatment in the future. For more information, talk to your care team.  Are you at risk for COVID-19?  Some studies suggest that people without symptoms may spread COVID-19.  You re at risk for getting COVID-19 if:    You live in (or recently traveled to) an area with a COVID-19 outbreak.    You had close contact with someone who has or may have COVID-19. Close contact means being within 6 feet, living in the same house, or visiting.  Last modified date: 5/20/2020  This information has been modified by your health care provider with permission from the publisher.    3430-3346 [address], [place] [address]. All rights reserved. This information is not intended as a substitute for professional medical care. Always follow your healthcare professional's instructions. This information has been modified by your health care provider with permission from the publisher.           Patient Education     Coronavirus Disease 2019 (COVID-19): Prevention  The best way to prevent COVID-19 is to avoid contact with the virus. There is no vaccine yet.  Cancel travel  "and other outings  Stay informed about COVID-19 in your area. School, sporting events and other activities may be cancelled. Follow local instructions. For example, you may need to:     Stay at least 6 feet away from others. This is called \"social distancing.\"    Stay at home and isolate yourself. You may hear terms like \"self-isolate, \"quarantine,\"  stay at home,   shelter in place,  and  lockdown.   Don t travel to areas with a COVID-19 outbreak. Don t go on a cruise. It s best to cancel any non-essential travel right now.  For the most up-to-date travel advisories, visit the CDC website at www.cdc.gov/coronavirus/2019-ncov/travelers.  When you re at home    Wash your hands often. Use soap and clean, running water for at least 20 seconds. Or, use hand  that has at least 60% alcohol.    Don't touch your eyes, nose, or mouth unless you have clean hands.    Don t kiss someone who is sick.    If you need to cough or sneeze, do it into a tissue. Then, throw the tissue into the trash. If you don't have tissues, cough or sneeze into the bend of your elbow.    Try to avoid \"high-touch\" surfaces, like doorknobs, handles, light switches, desks, printers, phones, kitchen counters, tables and bathroom surfaces. Clean these often with disinfectant (read the label for instructions). For cleaning tips, go to www.cdc.gov/coronavirus/2019-ncov/prepare/cleaning-disinfection.html.    Check your home supplies. Try to keep a 2-week supply of medicine, food, and household items.    Make a plan for childcare, work, and ways to stay in touch with others. Know who will help you if you get sick.    Don't be around people who are sick.    Don t share eating or drinking utensils with sick people.    Wash your hands after touching animals. Don't touch animals that may be sick.  If you leave home    Stay at least 6 feet away from all people.    Try to avoid \"high-touch\" public surfaces, like doorknobs, handles, and light switches. If " you need to touch these, clean them first with a disinfecting wipe. Or, touch them using a tissue or paper towel.    Use hand  often. Make sure it has at least 60% alcohol.    Don't touch your eyes, nose, or mouth unless you have clean hands.    If you need to cough or sneeze, do it into a tissue. Then throw the tissue into the trash. If you don't have tissues, cough or sneeze into the bend of your elbow.    Avoid public gatherings.    Wear a cloth face mask in public. It should cover both your nose and mouth. You may need to make your own mask using a bandana, T-shirt, or other cloth. See the CDC s instructions on how to make a mask.  If you re at a work site    Follow all of the instructions above.    If you feel sick in any way, go home and stay home.    Tell your manager if you are well but live with someone who has COVID-19.    Wash your hands often with soap and clean, running water for at least 20 seconds. Or, use hand  with at least 60% alcohol.    Don't shake hands. Don t have in-person meetings. (Meet over phone or video.)    Don't use other people's desks, offices, phones or equipment (pens, keyboards, eating or drinking utensils, etc.), if possible.    Use office glo one person at a time. Don t share coffee, tea or food in the office. Don t have meals in groups.    Wear a mask over your nose and mouth.    Clean work surfaces often with disinfectant. These include desks, photocopiers, printers, phones, kitchen counters, fridge door handles, bathroom surfaces, and others.  If you ve been around someone with COVID-19  In the past 14 days, if you've been around someone who has (or may have) COVID-19:    Contact your care team to ask for advice. Follow all instructions. You may need to stay home and limit your activities for up to 2 weeks.    Take your temperature every morning and evening for at least 14 days. This is to check for fever. Keep a record of the readings.    Watch for  symptoms of the virus. (See the CDC's symptom .) If you have symptoms, contact your care team.  When to contact your care team  Call your care team if you think you have COVID-19 symptoms. These can include fever, cough, trouble breathing, body aches, headaches, chills or repeated shaking with chills, sore throat, loss of tasted or smell, or diarrhea (loose, watery poops).  Don t go to a clinic or hospital before speaking to your care team.  Last modified date: 5/8/2020  This information has been modified by your health care provider with permission from the publisher.      1418-7107 [facility], [address] [place] [address], [place] [address]. All rights reserved. This information is not intended as a substitute for professional medical care. Always follow your healthcare professional's instructions. This information has been modified by your health care provider with permission from the publisher.

## 2020-06-03 NOTE — PROGRESS NOTES
"Alexander Alfred is a 74 year old male who is being evaluated via a billable telephone visit.      The patient has been notified of following:     \"This telephone visit will be conducted via a call between you and your physician/provider. We have found that certain health care needs can be provided without the need for a physical exam.  This service lets us provide the care you need with a short phone conversation.  If a prescription is necessary we can send it directly to your pharmacy.  If lab work is needed we can place an order for that and you can then stop by our lab to have the test done at a later time.    Telephone visits are billed at different rates depending on your insurance coverage. During this emergency period, for some insurers they may be billed the same as an in-person visit.  Please reach out to your insurance provider with any questions.    If during the course of the call the physician/provider feels a telephone visit is not appropriate, you will not be charged for this service.\"    Patient has given verbal consent for Telephone visit?  Yes    What phone number would you like to be contacted at? [phone]    How would you like to obtain your AVS?     Subjective     Alexander Alfred is a 74 year old male who presents via phone visit today for the following health issues:    HPI  Concern - States he was with a friend on Saturday who tested positive today.   Exposure was just over 72 hours ago (not within 48 hours of friend's positive test).  Friend was asymptomatic.  [place] Was not within 6 feet for more than 15 minutes. Not household contact. Neither was wearing a mask.  Denies any symptoms.  Co-morbidities include asthma (estimates inhaler use only once every few months; does not bother him regularly and currently not causing any issues), HTN.  Sochx: \"wife has a compromised immune system\", but he does not state what this is. Is concerned because wife is also in her 70's " as he is.  Just wants to make sure he shouldn't be tested.    Onset:     Description:    C/O No Smptoms  What action should he take ?    Intensity:     Progression of Symptoms:  none    Accompanying Signs & Symptoms:none      Previous history of similar problem:   none    Precipitating factors:   Worsened by: none    Alleviating factors:  Improved by: none    Therapies Tried and outcome: none           Patient Active Problem List   Diagnosis     Nasal polyps     Asthma, mild intermittent     Sleep apnea     Hyperlipidemia LDL goal <130     Hypertension goal BP (blood pressure) < 140/90     EE (eosinophilic esophagitis)     Osteoarthritis of left hip     Lumbar facet arthropathy     Lumbar degenerative disc disease     Lumbar pain     Left lumbosacral radiculopathy     Spondylolisthesis of lumbar region     Lumbar foraminal stenosis     Central spinal stenosis     Spinal stenosis of lumbar region at multiple levels     Acquired spondylolisthesis     Closed traumatic nondisplaced fracture of distal end of left fibula     Left ankle pain     Ankle fracture     Advanced directives, counseling/discussion     Lumbar stenosis     Abnormal joint on examination     Allergic rhinitis     Carpal tunnel syndrome     HNP (herniated nucleus pulposus), thoracic     Memory loss     Ostium secundum type atrial septal defect     Past Surgical History:   Procedure Laterality Date     COLONOSCOPY  2010    normal - due 2020     DECOMPRESSION, FUSION LUMBAR POSTERIOR THREE + LEVELS, COMBINED N/A 10/7/2014    Dr Knight - COMBINED DECOMPRESSION, FUSION LUMBAR POSTERIOR THREE + LEVELS     ESOPHAGOSCOPY, GASTROSCOPY, DUODENOSCOPY (EGD), COMBINED  8/7/2013    Dr Ramirez - EE, non erosive gastropathy     ROTATOR CUFF REPAIR RT/LT Left 2005    Lt shoulder - dr armaan lazar     ROTATOR CUFF REPAIR RT/LT Left 05/2009    Lt shoulder - dr armaan lazar     stress echo  7/11    normal     STRESS ECHO (METRO)  6/15    normal stress echo      SURGICAL HISTORY OF -   1982    Lt ankle CRIF     SURGICAL HISTORY OF -   4/08    blephoplasty     SURGICAL HISTORY OF -   11/16    T12-L1 - Decompression - Dr Knight -      TONSILLECTOMY  1950       Social History     Tobacco Use     Smoking status: Never Smoker     Smokeless tobacco: Never Used   Substance Use Topics     Alcohol use: Yes     Alcohol/week: 11.7 standard drinks     Types: 14 Standard drinks or equivalent per week     Comment: 2 drinks per day avg     Family History   Problem Relation Age of Onset     Hypertension Father      C.A.D. Father         smoker     C.A.D. Cousin         cousin - at 45     Hypertension Maternal Grandfather      C.A.D. Maternal Grandfather      Coronary Artery Disease Maternal Uncle      Coronary Artery Disease Maternal Uncle          Current Outpatient Medications   Medication Sig Dispense Refill     aspirin 81 MG chewable tablet Take 1 tablet (81 mg) by mouth daily 108 tablet 3     fluticasone (FLONASE) 50 MCG/ACT nasal spray Spray 2 sprays into both nostrils daily 48 g 3     losartan-hydrochlorothiazide (HYZAAR) 50-12.5 MG tablet Take 1 tablet by mouth daily 90 tablet 3     Multiple Vitamin (MULTIVITAMIN OR) Take 1 tablet by mouth daily        albuterol (PROAIR HFA/PROVENTIL HFA/VENTOLIN HFA) 108 (90 Base) MCG/ACT inhaler Inhale 2 puffs into the lungs every 6 hours as needed for shortness of breath / dyspnea or wheezing 3 Inhaler 3     Coenzyme Q10 (COQ-10) 100 MG CAPS Take 100 mg by mouth daily       KRILL OIL PO Take 1 capsule by mouth daily Taking MegaRed OTC. Strength unknown.       order for DME Equipment being ordered: CPAP mask 1 Device 0     Probiotic Product (4X PROBIOTIC) TABS        Allergies   Allergen Reactions     Codeine GI Disturbance     Hydrocodone GI Disturbance     Oxycodone GI Disturbance       Reviewed and updated as needed this visit by Provider  Tobacco  Allergies  Meds  Problems  Med Hx  Surg Hx  Fam Hx  Soc Hx          Review of Systems    Constitutional, HEENT, cardiovascular, pulmonary, gi and gu systems are negative, except as otherwise noted.       Objective   Reported vitals:  There were no vitals taken for this visit.   alert and cooperative  PSYCH: Alert and oriented times 3; coherent speech, normal   rate and volume, able to articulate logical thoughts, able   to abstract reason, no tangential thoughts, no hallucinations   or delusions  His affect is normal  RESP: No cough, no audible wheezing, able to talk in full sentences  Remainder of exam unable to be completed due to telephone visits    Diagnostic Test Results:  none        Assessment/Plan:  1. Contact with and (suspected) exposure to other viral communicable diseases  Pt reported exposure to friend with + COVID19 testing.  Consulted with [facility]. Does not meet criteria for screening as he is asymptomatic and there was no exposure of <6ft for >15 min within 48 hours of his friend's test.  Educated on sign/sx of COVID and advised to follow current guidelines per [facility]/CDC. Resources provided.    Return if you develop symptoms of COVID-19 or have further concerns.    Phone call duration:  20 minutes    Britta García PA-C

## 2020-06-05 ENCOUNTER — TELEPHONE (OUTPATIENT)
Dept: FAMILY MEDICINE | Facility: CLINIC | Age: 75
End: 2020-06-05

## 2020-06-05 NOTE — TELEPHONE ENCOUNTER
Called # [phone]     Pt advised of COVID 19 Nurse Triage Plan/Patient Instructions    Please be aware that novel coronavirus (COVID-19) may be circulating in the community. If you develop symptoms such as fever, cough, or SOB or if you have concerns about the presence of another infection including coronavirus (COVID-19), please contact your health care provider or visit [email or web address].     Disposition/Instructions    Patient to stay at home and follow home care protocol based instructions.     Thank you for taking steps to prevent the spread of this virus.  o Limit your contact with others.  o Wear a simple mask to cover your cough.  o Wash your hands well and often.    Resources    [facility]: About COVID-19: [email or web address]    CDC: What to Do If You're Sick: www.cdc.gov/coronavirus/2019-ncov/about/steps-when-sick.html    CDC: Ending Home Isolation: www.cdc.gov/coronavirus/2019-ncov/hcp/disposition-in-home-patients.html     CDC: Caring for Someone: www.cdc.gov/coronavirus/2019-ncov/if-you-are-sick/care-for-someone.html     [facility]: Interim Guidance for Hospital Discharge to Home: www.health.[facility].[place]/diseases/coronavirus/hcp/hospdischarge.pdf    [facility] clinical trials (COVID-19 research studies): clinicalaffairs.[facility]-clinical-trials     Below are the COVID-19 hotlines at the [facility] ([facility]). Interpreters are available.   o For health questions: Call 031-618-6428 or 1-986.350.8708 (7 a.m. to 7 p.m.)  o For questions about schools and childcare: Call [phone] or 1-263.102.3493 (7 a.m. to 7 p.m.)         Patient stated an understanding and agreed with plan.  Pt advised to call back if symptoms do develop      Farhat Gibson RN   [facility] Triage

## 2020-06-05 NOTE — TELEPHONE ENCOUNTER
Reason for Call:  Other call back    Detailed comments: Patient calling, states he did a virtual visit 6/3/2020 regarding coronavirus exposure. Patient would like to mention that the friend who tested positive did have symptoms of a dry cough and scratchy throat starting from 06/01/2020. Patient would like to know if this changes anything.    Phone Number Patient can be reached at: Home number on file [phone] (home)    Best Time: anytime    Can we leave a detailed message on this number? NO    Call taken on 6/5/2020 at 10:31 AM by Alejandrina GARCIA

## 2020-09-09 ENCOUNTER — OFFICE VISIT (OUTPATIENT)
Dept: FAMILY MEDICINE | Facility: CLINIC | Age: 75
End: 2020-09-09
Payer: COMMERCIAL

## 2020-09-09 VITALS
SYSTOLIC BLOOD PRESSURE: 150 MMHG | TEMPERATURE: 96.7 F | OXYGEN SATURATION: 97 % | BODY MASS INDEX: 26.67 KG/M2 | HEIGHT: 68 IN | WEIGHT: 176 LBS | DIASTOLIC BLOOD PRESSURE: 72 MMHG | HEART RATE: 57 BPM

## 2020-09-09 DIAGNOSIS — G47.33 OBSTRUCTIVE SLEEP APNEA SYNDROME: ICD-10-CM

## 2020-09-09 DIAGNOSIS — R73.03 PREDIABETES: ICD-10-CM

## 2020-09-09 DIAGNOSIS — M51.369 LUMBAR DEGENERATIVE DISC DISEASE: ICD-10-CM

## 2020-09-09 DIAGNOSIS — Z51.81 MEDICATION MONITORING ENCOUNTER: ICD-10-CM

## 2020-09-09 DIAGNOSIS — E78.5 HYPERLIPIDEMIA LDL GOAL <130: ICD-10-CM

## 2020-09-09 DIAGNOSIS — J45.20 MILD INTERMITTENT ASTHMA WITHOUT COMPLICATION: ICD-10-CM

## 2020-09-09 DIAGNOSIS — M43.16 SPONDYLOLISTHESIS OF LUMBAR REGION: ICD-10-CM

## 2020-09-09 DIAGNOSIS — M48.00 CENTRAL SPINAL STENOSIS: ICD-10-CM

## 2020-09-09 DIAGNOSIS — Z00.00 ENCOUNTER FOR MEDICARE ANNUAL WELLNESS EXAM: ICD-10-CM

## 2020-09-09 DIAGNOSIS — J33.9 NASAL POLYP: ICD-10-CM

## 2020-09-09 DIAGNOSIS — M48.061 LUMBAR FORAMINAL STENOSIS: ICD-10-CM

## 2020-09-09 DIAGNOSIS — Z12.11 SCREEN FOR COLON CANCER: ICD-10-CM

## 2020-09-09 DIAGNOSIS — Z00.00 ENCOUNTER FOR ROUTINE ADULT HEALTH EXAMINATION WITHOUT ABNORMAL FINDINGS: Primary | ICD-10-CM

## 2020-09-09 DIAGNOSIS — Z12.5 SCREENING FOR PROSTATE CANCER: ICD-10-CM

## 2020-09-09 DIAGNOSIS — M47.816 LUMBAR FACET ARTHROPATHY: ICD-10-CM

## 2020-09-09 DIAGNOSIS — I10 HYPERTENSION GOAL BP (BLOOD PRESSURE) < 140/90: ICD-10-CM

## 2020-09-09 DIAGNOSIS — M54.17 LEFT LUMBOSACRAL RADICULOPATHY: ICD-10-CM

## 2020-09-09 DIAGNOSIS — M48.061 SPINAL STENOSIS OF LUMBAR REGION AT MULTIPLE LEVELS: ICD-10-CM

## 2020-09-09 LAB
ALBUMIN SERPL-MCNC: 3.8 G/DL (ref 3.4–5)
ALBUMIN UR-MCNC: NEGATIVE MG/DL
ALP SERPL-CCNC: 79 U/L (ref 40–150)
ALT SERPL W P-5'-P-CCNC: 38 U/L (ref 0–70)
ANION GAP SERPL CALCULATED.3IONS-SCNC: 6 MMOL/L (ref 3–14)
APPEARANCE UR: CLEAR
AST SERPL W P-5'-P-CCNC: 26 U/L (ref 0–45)
BILIRUB SERPL-MCNC: 0.6 MG/DL (ref 0.2–1.3)
BILIRUB UR QL STRIP: NEGATIVE
BUN SERPL-MCNC: 20 MG/DL (ref 7–30)
CALCIUM SERPL-MCNC: 9 MG/DL (ref 8.5–10.1)
CHLORIDE SERPL-SCNC: 107 MMOL/L (ref 94–109)
CHOLEST SERPL-MCNC: 161 MG/DL
CK SERPL-CCNC: 259 U/L (ref 30–300)
CO2 SERPL-SCNC: 25 MMOL/L (ref 20–32)
COLOR UR AUTO: YELLOW
CREAT SERPL-MCNC: 1.15 MG/DL (ref 0.66–1.25)
CREAT UR-MCNC: 81 MG/DL
ERYTHROCYTE [DISTWIDTH] IN BLOOD BY AUTOMATED COUNT: 13 % (ref 10–15)
GFR SERPL CREATININE-BSD FRML MDRD: 62 ML/MIN/{1.73_M2}
GLUCOSE SERPL-MCNC: 93 MG/DL (ref 70–99)
GLUCOSE UR STRIP-MCNC: NEGATIVE MG/DL
HBA1C MFR BLD: 5.3 % (ref 0–5.6)
HCT VFR BLD AUTO: 43.2 % (ref 40–53)
HDLC SERPL-MCNC: 58 MG/DL
HGB BLD-MCNC: 14.3 G/DL (ref 13.3–17.7)
HGB UR QL STRIP: NEGATIVE
KETONES UR STRIP-MCNC: NEGATIVE MG/DL
LDLC SERPL CALC-MCNC: 79 MG/DL
LEUKOCYTE ESTERASE UR QL STRIP: NEGATIVE
MCH RBC QN AUTO: 31.9 PG (ref 26.5–33)
MCHC RBC AUTO-ENTMCNC: 33.1 G/DL (ref 31.5–36.5)
MCV RBC AUTO: 96 FL (ref 78–100)
MICROALBUMIN UR-MCNC: 5 MG/L
MICROALBUMIN/CREAT UR: 6.51 MG/G CR (ref 0–17)
NITRATE UR QL: NEGATIVE
NONHDLC SERPL-MCNC: 103 MG/DL
PH UR STRIP: 5.5 PH (ref 5–7)
PLATELET # BLD AUTO: 250 10E9/L (ref 150–450)
POTASSIUM SERPL-SCNC: 3.8 MMOL/L (ref 3.4–5.3)
PROT SERPL-MCNC: 7.3 G/DL (ref 6.8–8.8)
RBC # BLD AUTO: 4.48 10E12/L (ref 4.4–5.9)
SODIUM SERPL-SCNC: 138 MMOL/L (ref 133–144)
SOURCE: NORMAL
SP GR UR STRIP: 1.02 (ref 1–1.03)
TRIGL SERPL-MCNC: 120 MG/DL
TSH SERPL DL<=0.005 MIU/L-ACNC: 2.61 MU/L (ref 0.4–4)
UROBILINOGEN UR STRIP-ACNC: 0.2 EU/DL (ref 0.2–1)
WBC # BLD AUTO: 6.8 10E9/L (ref 4–11)

## 2020-09-09 PROCEDURE — 82550 ASSAY OF CK (CPK): CPT | Performed by: FAMILY MEDICINE

## 2020-09-09 PROCEDURE — 84443 ASSAY THYROID STIM HORMONE: CPT | Performed by: FAMILY MEDICINE

## 2020-09-09 PROCEDURE — 90662 IIV NO PRSV INCREASED AG IM: CPT | Performed by: FAMILY MEDICINE

## 2020-09-09 PROCEDURE — 99397 PER PM REEVAL EST PAT 65+ YR: CPT | Mod: 25 | Performed by: FAMILY MEDICINE

## 2020-09-09 PROCEDURE — 80053 COMPREHEN METABOLIC PANEL: CPT | Performed by: FAMILY MEDICINE

## 2020-09-09 PROCEDURE — 80061 LIPID PANEL: CPT | Performed by: FAMILY MEDICINE

## 2020-09-09 PROCEDURE — 83036 HEMOGLOBIN GLYCOSYLATED A1C: CPT | Performed by: FAMILY MEDICINE

## 2020-09-09 PROCEDURE — G0103 PSA SCREENING: HCPCS | Performed by: FAMILY MEDICINE

## 2020-09-09 PROCEDURE — 85027 COMPLETE CBC AUTOMATED: CPT | Performed by: FAMILY MEDICINE

## 2020-09-09 PROCEDURE — 81003 URINALYSIS AUTO W/O SCOPE: CPT | Performed by: FAMILY MEDICINE

## 2020-09-09 PROCEDURE — 82043 UR ALBUMIN QUANTITATIVE: CPT | Performed by: FAMILY MEDICINE

## 2020-09-09 PROCEDURE — 36415 COLL VENOUS BLD VENIPUNCTURE: CPT | Performed by: FAMILY MEDICINE

## 2020-09-09 PROCEDURE — G0008 ADMIN INFLUENZA VIRUS VAC: HCPCS | Performed by: FAMILY MEDICINE

## 2020-09-09 RX ORDER — ALBUTEROL SULFATE 90 UG/1
2 AEROSOL, METERED RESPIRATORY (INHALATION) EVERY 6 HOURS PRN
Qty: 3 INHALER | Refills: 3 | Status: SHIPPED | OUTPATIENT
Start: 2020-09-09 | End: 2021-08-16

## 2020-09-09 RX ORDER — LOSARTAN POTASSIUM AND HYDROCHLOROTHIAZIDE 12.5; 5 MG/1; MG/1
1 TABLET ORAL DAILY
Qty: 90 TABLET | Refills: 3 | Status: SHIPPED | OUTPATIENT
Start: 2020-09-09 | End: 2021-08-16

## 2020-09-09 RX ORDER — FLUTICASONE PROPIONATE 50 MCG
2 SPRAY, SUSPENSION (ML) NASAL DAILY
Qty: 48 G | Refills: 3 | Status: SHIPPED | OUTPATIENT
Start: 2020-09-09 | End: 2021-08-16

## 2020-09-09 ASSESSMENT — MIFFLIN-ST. JEOR: SCORE: 1512.83

## 2020-09-09 ASSESSMENT — ACTIVITIES OF DAILY LIVING (ADL): CURRENT_FUNCTION: NO ASSISTANCE NEEDED

## 2020-09-09 NOTE — LETTER
[facility]  [address] [place] [address]  [phone]                    September 11, 2020    Alexander Alfred  [address]  [place] [address]      Dear Alexander,    Here is a summary of your recent test results:    They showed:     Labs are overall quite good     We advise:     Continue current cares.   Balanced low cholesterol diet.   Regular exercise.       For additional lab test information, [email or web address] is an excellent reference.     Your test results are enclosed.      Please contact me if you have any questions.    In addition, here is a list of due or overdue Health Maintenance reminders.    Health Maintenance Due   Topic Date Due     Zoster (Shingles) Vaccine (2 of 3) 01/28/2016     Asthma Action Plan - yearly  08/22/2017     Colorectal Cancer Screening  08/20/2020       Please call us at [phone] (or use [employer]) to address the above recommendations.            Thank you very much for trusting [facility]..     Healthy regards,          Messi Saldana M.D.        Results for orders placed or performed in visit on 09/09/20   Comprehensive metabolic panel     Status: None   Result Value Ref Range    Sodium 138 133 - 144 mmol/L    Potassium 3.8 3.4 - 5.3 mmol/L    Chloride 107 94 - 109 mmol/L    Carbon Dioxide 25 20 - 32 mmol/L    Anion Gap 6 3 - 14 mmol/L    Glucose 93 70 - 99 mg/dL    Urea Nitrogen 20 7 - 30 mg/dL    Creatinine 1.15 0.66 - 1.25 mg/dL    GFR Estimate 62 >60 mL/min/[1.73_m2]    GFR Estimate If Black 72 >60 mL/min/[1.73_m2]    Calcium 9.0 8.5 - 10.1 mg/dL    Bilirubin Total 0.6 0.2 - 1.3 mg/dL    Albumin 3.8 3.4 - 5.0 g/dL    Protein Total 7.3 6.8 - 8.8 g/dL    Alkaline Phosphatase 79 40 - 150 U/L    ALT 38 0 - 70 U/L    AST 26 0 - 45 U/L   Lipid panel reflex to direct LDL Fasting     Status: None   Result Value Ref Range    Cholesterol 161 <200 mg/dL    Triglycerides 120 <150 mg/dL    HDL Cholesterol 58 >39 mg/dL    LDL  Cholesterol Calculated 79 <100 mg/dL    Non HDL Cholesterol 103 <130 mg/dL   CK total     Status: None   Result Value Ref Range    CK Total 259 30 - 300 U/L   CBC with platelets     Status: None   Result Value Ref Range    WBC 6.8 4.0 - 11.0 10e9/L    RBC Count 4.48 4.4 - 5.9 10e12/L    Hemoglobin 14.3 13.3 - 17.7 g/dL    Hematocrit 43.2 40.0 - 53.0 %    MCV 96 78 - 100 fl    MCH 31.9 26.5 - 33.0 pg    MCHC 33.1 31.5 - 36.5 g/dL    RDW 13.0 10.0 - 15.0 %    Platelet Count 250 150 - 450 10e9/L   TSH with free T4 reflex     Status: None   Result Value Ref Range    TSH 2.61 0.40 - 4.00 mU/L   UA reflex to Microscopic and Culture     Status: None    Specimen: Midstream Urine   Result Value Ref Range    Color Urine Yellow     Appearance Urine Clear     Glucose Urine Negative NEG^Negative mg/dL    Bilirubin Urine Negative NEG^Negative    Ketones Urine Negative NEG^Negative mg/dL    Specific Gravity Urine 1.020 1.003 - 1.035    Blood Urine Negative NEG^Negative    pH Urine 5.5 5.0 - 7.0 pH    Protein Albumin Urine Negative NEG^Negative mg/dL    Urobilinogen Urine 0.2 0.2 - 1.0 EU/dL    Nitrite Urine Negative NEG^Negative    Leukocyte Esterase Urine Negative NEG^Negative    Source Midstream Urine    Albumin Random Urine Quantitative with Creat Ratio     Status: None   Result Value Ref Range    Creatinine Urine 81 mg/dL    Albumin Urine mg/L 5 mg/L    Albumin Urine mg/g Cr 6.51 0 - 17 mg/g Cr   Prostate spec antigen screen     Status: None   Result Value Ref Range    PSA 0.83 0 - 4 ug/L   Hemoglobin A1c     Status: None   Result Value Ref Range    Hemoglobin A1C 5.3 0 - 5.6 %

## 2020-09-09 NOTE — PROGRESS NOTES
"[facility]    Alexander Alfred is a 75 year old male who presents for Preventive Visit.    Are you in the first 12 months of your Medicare coverage?  No    Healthy Habits:    In general, how would you rate your overall health?  Very good    Frequency of exercise:  2-3 days/week    Duration of exercise:  30-45 minutes    Do you usually eat at least 4 servings of fruit and vegetables a day, include whole grains    & fiber and avoid regularly eating high fat or \"junk\" foods?  Yes    Taking medications regularly:  Yes    Barriers to taking medications:  None    Medication side effects:  None    Ability to successfully perform activities of daily living:  No assistance needed    Home Safety:  No safety concerns identified    Hearing impairment symptoms: right ear has ringing.    In the past 6 months, have you been bothered by leaking of urine?  No    In general, how would you rate your overall mental or emotional health?  Very good      PHQ-2 Total Score:    Additional concerns today:  No    Do you feel safe in your environment? Yes    Have you ever done Advance Care Planning? (For example, a Health Directive, POLST, or a discussion with a medical provider or your loved ones about your wishes): No, advance care planning information given to patient to review.  Advanced care planning was discussed at today's visit.      Fall risk  Fallen 2 or more times in the past year?: No  Any fall with injury in the past year?: No    Cognitive Screening   1) Repeat 3 items (Leader, Season, Table)    2) Clock draw: NORMAL  3) 3 item recall: Recalls 3 objects  Results: 3 items recalled: COGNITIVE IMPAIRMENT LESS LIKELY    Mini-CogTM Copyright ANITHA Sexton. Licensed by the author for use in [facility]; reprinted with permission ([email or web address] [email or web address]). All rights reserved.      Do you have sleep apnea, excessive snoring or daytime drowsiness?: yes uses cpap machine    Reviewed and updated as needed this " visit by clinical staff  Tobacco  Allergies  Meds  Med Hx  Surg Hx  Fam Hx  Soc Hx        Reviewed and updated as needed this visit by Provider        Social History     Tobacco Use     Smoking status: Never Smoker     Smokeless tobacco: Never Used   Substance Use Topics     Alcohol use: Yes     Alcohol/week: 11.7 standard drinks     Types: 14 Standard drinks or equivalent per week     Comment: 2 drinks per day avg     If you drink alcohol do you typically have >3 drinks per day or >7 drinks per week? No    Alcohol Use 9/9/2020   Prescreen: >3 drinks/day or >7 drinks/week? No     Hypertension Follow-up      Do you check your blood pressure regularly outside of the clinic? Yes     Are you following a low salt diet? Yes    Are your blood pressures ever more than 140 on the top number (systolic) OR more   than 90 on the bottom number (diastolic), for example 140/90? Yes     BP Readings from Last 3 Encounters:   09/09/20 (!) 150/72   01/27/20 (!) 161/91   08/08/19 128/62     Creatinine   Date Value Ref Range Status   08/08/2019 1.00 0.66 - 1.25 mg/dL Final     Asthma    ACT = 25    Prediabetes/Htn/Lipids    Glucose   Date Value Ref Range Status   08/08/2019 115 (H) 70 - 99 mg/dL Final     Comment:     Fasting specimen   08/03/2018 95 70 - 99 mg/dL Final     Comment:     Fasting specimen   07/17/2017 94 70 - 99 mg/dL Final   10/11/2016 119 (H) 70 - 99 mg/dL Final   08/22/2016 91 70 - 99 mg/dL Final     Lab Results   Component Value Date    A1C 5.3 09/09/2020    A1C 5.3 06/15/2015     BP Readings from Last 3 Encounters:   09/09/20 (!) 150/72   01/27/20 (!) 161/91   08/08/19 128/62     Creatinine   Date Value Ref Range Status   08/08/2019 1.00 0.66 - 1.25 mg/dL Final     AMBER    Using CPAP    Cervical and Lumbar spine     Doing well with ibuprofen/stretching ion am, residual loss of left calf strength    Current providers sharing in care for this patient include:   Patient Care Team:  Messi Saldana MD as PCP -  Britta Allen PA-C as Assigned PCP    The following health maintenance items are reviewed in Epic and correct as of today:  Health Maintenance   Topic Date Due     ZOSTER IMMUNIZATION (2 of 3) 01/28/2016     ASTHMA ACTION PLAN  08/22/2017     ASTHMA CONTROL TEST  02/08/2020     MEDICARE ANNUAL WELLNESS VISIT  08/08/2020     BMP  08/08/2020     LIPID  08/08/2020     MICROALBUMIN  08/08/2020     PSA  08/08/2020     FALL RISK ASSESSMENT  08/08/2020     COLORECTAL CANCER SCREENING  08/20/2020     INFLUENZA VACCINE (1) 09/01/2020     DTAP/TDAP/TD IMMUNIZATION (3 - Td) 06/21/2022     ADVANCE CARE PLANNING  09/09/2025     HEPATITIS C SCREENING  Completed     PHQ-2  Completed     PNEUMOCOCCAL IMMUNIZATION 65+ LOW/MEDIUM RISK  Completed     AORTIC ANEURYSM SCREENING (SYSTEM ASSIGNED)  Completed     IPV IMMUNIZATION  Aged Out     MENINGITIS IMMUNIZATION  Aged Out     HEPATITIS B IMMUNIZATION  Aged Out       Health Maintenance     Colonoscopy:  due   FIT:  given              PSA:  pending   DEXA:  NA    Health Maintenance Due   Topic Date Due     ZOSTER IMMUNIZATION (2 of 3) 01/28/2016     ASTHMA ACTION PLAN  08/22/2017     ASTHMA CONTROL TEST  02/08/2020     MEDICARE ANNUAL WELLNESS VISIT  08/08/2020     BMP  08/08/2020     LIPID  08/08/2020     MICROALBUMIN  08/08/2020     PSA  08/08/2020     FALL RISK ASSESSMENT  08/08/2020     COLORECTAL CANCER SCREENING  08/20/2020     INFLUENZA VACCINE (1) 09/01/2020       Current Problem List    Patient Active Problem List   Diagnosis     Nasal polyps     Asthma, mild intermittent     Sleep apnea     Hyperlipidemia LDL goal <130     Hypertension goal BP (blood pressure) < 140/90     EE (eosinophilic esophagitis)     Osteoarthritis of left hip     Lumbar facet arthropathy     Lumbar degenerative disc disease     Lumbar pain     Left lumbosacral radiculopathy     Spondylolisthesis of lumbar region     Lumbar foraminal stenosis     Central spinal stenosis     Spinal  stenosis of lumbar region at multiple levels     Acquired spondylolisthesis     Closed traumatic nondisplaced fracture of distal end of left fibula     Left ankle pain     Ankle fracture     Advanced directives, counseling/discussion     Lumbar stenosis     Abnormal joint on examination     Allergic rhinitis     Carpal tunnel syndrome     HNP (herniated nucleus pulposus), thoracic     Memory loss     Ostium secundum type atrial septal defect       Past Medical History    Past Medical History:   Diagnosis Date     Asthma, mild intermittent     [place] asthma     EE (eosinophilic esophagitis) 8/13    Dr Ramirez     Hyperlipidemia LDL goal <130      Hypertension goal BP (blood pressure) < 140/90      Lumbar stenosis     Dr Knight - Calf weakness/atrophy     Nasal polyps 2004    approx 2/year - increased sx from nasal polyps - prednisone 40 mg x 2 days, 20 mg x 2 days, 10mg x 10 days with excellent results     Other chronic pain      Sleep apnea 2006    moderate - CPAP - 5-15 cm       Past Surgical History    Past Surgical History:   Procedure Laterality Date     COLONOSCOPY  2010    normal - due 2020     DECOMPRESSION, FUSION LUMBAR POSTERIOR THREE + LEVELS, COMBINED N/A 10/7/2014    Dr Knight - COMBINED DECOMPRESSION, FUSION LUMBAR POSTERIOR THREE + LEVELS     ESOPHAGOSCOPY, GASTROSCOPY, DUODENOSCOPY (EGD), COMBINED  8/7/2013    Dr Ramirez - EE, non erosive gastropathy     ROTATOR CUFF REPAIR RT/LT Left 2005    Lt shoulder - dr armaan lazar     ROTATOR CUFF REPAIR RT/LT Left 05/2009    Lt shoulder - dr armaan lazar     stress echo  7/11    normal     STRESS ECHO (METRO)  6/15    normal stress echo     SURGICAL HISTORY OF -   1982    Lt ankle CRIF     SURGICAL HISTORY OF -   4/08    blephoplasty     SURGICAL HISTORY OF -   11/16    T12-L1 - Decompression - Dr Knight -      TONSILLECTOMY  1950       Current Medications    Current Outpatient Medications   Medication Sig Dispense Refill     albuterol (PROAIR  HFA/PROVENTIL HFA/VENTOLIN HFA) 108 (90 Base) MCG/ACT inhaler Inhale 2 puffs into the lungs every 6 hours as needed for shortness of breath / dyspnea or wheezing 3 Inhaler 3     aspirin 81 MG chewable tablet Take 1 tablet (81 mg) by mouth daily 108 tablet 3     Coenzyme Q10 (COQ-10) 100 MG CAPS Take 100 mg by mouth daily       fluticasone (FLONASE) 50 MCG/ACT nasal spray Spray 2 sprays into both nostrils daily 48 g 3     KRILL OIL PO Take 1 capsule by mouth daily Taking MegaRed OTC. Strength unknown.       losartan-hydrochlorothiazide (HYZAAR) 50-12.5 MG tablet Take 1 tablet by mouth daily 90 tablet 3     Multiple Vitamin (MULTIVITAMIN OR) Take 1 tablet by mouth daily        Probiotic Product (4X PROBIOTIC) TABS          Allergies    Allergies   Allergen Reactions     Codeine GI Disturbance     Hydrocodone GI Disturbance     Oxycodone GI Disturbance       Immunizations    Immunization History   Administered Date(s) Administered     Influenza (High Dose) 3 valent vaccine 10/31/2011, 01/08/2013, 09/24/2014, 12/03/2015, 10/11/2016, 11/09/2017     Influenza (IIV3) PF 11/01/2013     Influenza (intradermal) 10/10/2018     Influenza, Quad, High Dose, Pf, 65yr + 09/09/2020     Pneumo Conj 13-V (2010&after) 06/15/2015     Pneumococcal 23 valent 10/31/2011     TD (ADULT, 7+) 01/01/2004     TDAP Vaccine (Boostrix) 06/21/2012     Zoster vaccine, live 12/03/2015       Family History    Family History   Problem Relation Age of Onset     Hypertension Father      C.A.D. Father         smoker     C.A.ZOEY. Cousin         cousin - at 45     Hypertension Maternal Grandfather      SHAYLA.A.ZOEY. Maternal Grandfather      Coronary Artery Disease Maternal Uncle      Coronary Artery Disease Maternal Uncle        Social History    Social History     Socioeconomic History     Marital status:      Spouse name: Valeria     Number of children: 3     Years of education: 16     Highest education level: Not on file   Occupational History     Employer:  [employer]   Social Needs     Financial resource strain: Not on file     Food insecurity     Worry: Not on file     Inability: Not on file     Transportation needs     Medical: Not on file     Non-medical: Not on file   Tobacco Use     Smoking status: Never Smoker     Smokeless tobacco: Never Used   Substance and Sexual Activity     Alcohol use: Yes     Alcohol/week: 11.7 standard drinks     Types: 14 Standard drinks or equivalent per week     Comment: 2 drinks per day avg     Drug use: No     Sexual activity: Yes     Partners: Female     Birth control/protection: Rhythm, Male Surgical   Lifestyle     Physical activity     Days per week: Not on file     Minutes per session: Not on file     Stress: Not on file   Relationships     Social connections     Talks on phone: Not on file     Gets together: Not on file     Attends [employer] service: Not on file     Active member of club or organization: Not on file     Attends meetings of clubs or organizations: Not on file     Relationship status: Not on file     Intimate partner violence     Fear of current or ex partner: Not on file     Emotionally abused: Not on file     Physically abused: Not on file     Forced sexual activity: Not on file   Other Topics Concern     Parent/sibling w/ CABG, MI or angioplasty before 65F 55M? Yes      Service Not Asked     Blood Transfusions Not Asked     Caffeine Concern Yes     Comment: 2-3 cups daily     Occupational Exposure Not Asked     Hobby Hazards Not Asked     Sleep Concern Not Asked     Stress Concern Not Asked     Weight Concern Not Asked     Special Diet Not Asked     Back Care Not Asked     Exercise Yes     Comment: 3+/wk, landscaping, Mtn Bike riding     Bike Helmet Not Asked     Seat Belt Yes     Self-Exams Not Asked   Social History Narrative     Not on file       ROS    CONSTITUTIONAL: NEGATIVE for fever, chills, change in weight  INTEGUMENTARY/SKIN: NEGATIVE for worrisome rashes, moles or lesions  EYES: NEGATIVE for  "vision changes or irritation  ENT/MOUTH: NEGATIVE for ear, mouth and throat problems  RESP: NEGATIVE for significant cough or SOB  CV: NEGATIVE for chest pain, palpitations or peripheral edema  GI: NEGATIVE for nausea, abdominal pain, heartburn, or change in bowel habits  : NEGATIVE for frequency, dysuria, or hematuria  MUSCULOSKELETAL: NEGATIVE for significant arthralgias or myalgia  NEURO: NEGATIVE for weakness, dizziness or paresthesias  ENDOCRINE: NEGATIVE for temperature intolerance, skin/hair changes  HEME: NEGATIVE for bleeding problems  PSYCHIATRIC: NEGATIVE for changes in mood or affect    OBJECTIVE    BP (!) 150/72   Pulse 57   Temp 96.7  F (35.9  C) (Tympanic)   Ht 1.727 m (5' 8\")   Wt 79.8 kg (176 lb)   SpO2 97%   BMI 26.76 kg/m   Estimated body mass index is 26.76 kg/m  as calculated from the following:    Height as of this encounter: 1.727 m (5' 8\").    Weight as of this encounter: 79.8 kg (176 lb).  EXAM:   GENERAL: healthy, alert and no distress  EYES: Eyes grossly normal to inspection, PERRL and conjunctivae and sclerae normal  HENT: ear canals and TM's normal, nose and mouth without ulcers or lesions  NECK: no adenopathy, no asymmetry, masses, or scars and thyroid normal to palpation  RESP: lungs clear to auscultation - no rales, rhonchi or wheezes  CV: regular rate and rhythm, normal S1 S2, no S3 or S4, no murmur, click or rub, no peripheral edema and peripheral pulses strong  ABDOMEN: soft, nontender, no hepatosplenomegaly, no masses and bowel sounds normal   (male): testicles normal without atrophy or masses, no hernias and penis normal without urethral discharge  RECTAL: normal sphincter tone, no rectal masses and prostate of normal size for age, smooth, nontender without masses/nodules  MS: no gross musculoskeletal defects noted, no edema  NEURO: Normal strength and tone, mentation intact and speech normal  PSYCH: mentation appears normal, affect normal/bright  LYMPH: no cervical, " supraclavicular, axillary, or inguinal adenopathy    DIAGNOSTICS/PROCEDURES    Pending    ASSESSMENT      ICD-10-CM    1. Encounter for routine adult health examination without abnormal findings  Z00.00 Comprehensive metabolic panel     Lipid panel reflex to direct LDL Fasting     CK total     CBC with platelets     TSH with free T4 reflex     UA reflex to Microscopic and Culture     Albumin Random Urine Quantitative with Creat Ratio     Prostate spec antigen screen     Fecal colorectal cancer screen FIT     Hemoglobin A1c     HC FLU VACCINE, INCREASED ANTIGEN, PRESV FREE [[id number]]     GASTROENTEROLOGY ADULT REF PROCEDURE ONLY   2. Encounter for Medicare annual wellness exam  Z00.00 Comprehensive metabolic panel     Lipid panel reflex to direct LDL Fasting     CK total     CBC with platelets     TSH with free T4 reflex     UA reflex to Microscopic and Culture     Albumin Random Urine Quantitative with Creat Ratio     Prostate spec antigen screen     Fecal colorectal cancer screen FIT     Hemoglobin A1c     HC FLU VACCINE, INCREASED ANTIGEN, PRESV FREE [[id number]]     GASTROENTEROLOGY ADULT REF PROCEDURE ONLY   3. Mild intermittent asthma without complication  J45.20 albuterol (PROAIR HFA/PROVENTIL HFA/VENTOLIN HFA) 108 (90 Base) MCG/ACT inhaler   4. Nasal polyps  J33.9 fluticasone (FLONASE) 50 MCG/ACT nasal spray   5. Prediabetes  R73.03 Comprehensive metabolic panel     Lipid panel reflex to direct LDL Fasting     TSH with free T4 reflex     UA reflex to Microscopic and Culture     Albumin Random Urine Quantitative with Creat Ratio     Hemoglobin A1c   6. Hypertension goal BP (blood pressure) < 140/90  I10 Comprehensive metabolic panel     UA reflex to Microscopic and Culture     Albumin Random Urine Quantitative with Creat Ratio     losartan-hydrochlorothiazide (HYZAAR) 50-12.5 MG tablet   7. Hyperlipidemia LDL goal <130  E78.5 Comprehensive metabolic panel     Lipid panel reflex to direct LDL Fasting     CK total   8.  Obstructive sleep apnea syndrome  G47.33 TSH with free T4 reflex     fluticasone (FLONASE) 50 MCG/ACT nasal spray   9. Spinal stenosis of lumbar region at multiple levels  M48.061    10. Lumbar degenerative disc disease  M51.36    11. Lumbar facet arthropathy  M47.816    12. Left lumbosacral radiculopathy  M54.17    13. Spondylolisthesis of lumbar region  M43.16    14. Lumbar foraminal stenosis  M48.061    15. Central spinal stenosis  M48.00    16. Screen for colon cancer  Z12.11 Fecal colorectal cancer screen FIT     GASTROENTEROLOGY ADULT REF PROCEDURE ONLY   17. Screening for prostate cancer  Z12.5 Prostate spec antigen screen   18. Medication monitoring encounter  Z51.81 Comprehensive metabolic panel     Lipid panel reflex to direct LDL Fasting     CK total     CBC with platelets     TSH with free T4 reflex     UA reflex to Microscopic and Culture     Albumin Random Urine Quantitative with Creat Ratio     Hemoglobin A1c     HC FLU VACCINE, INCREASED ANTIGEN, PRESV FREE [[id number]]     GASTROENTEROLOGY ADULT REF PROCEDURE ONLY       PLAN    Discussed treatment/modality options, including risk and benefits, he desires:    advised alcohol consumption 1oz per day or less, advised aspirin 81 mg po daily, advised 1 multivitamin per day, advised calcium 5597-0172 mg/d and Vitamin D 800-1200 IU/d, advised dentist every 6 months, advised diet and exercise, advised opthalmologist every 1-2 years, advised self testicular exam q month, further health care maintenance, further lab(s), immunization(s), ACT, Asthma Action Plan, completed and explained, SIMÓN 7, completed and reviewed, PHQ-9, Depression Action Plan, completed and reviewed and observation    All diagnosis above reviewed and noted above, otherwise stable.      See [facility] orders for further details.      1) med refill    2) labs pending    3) fku vaccine    4) consider shingrix    Return in about 6 months (around 3/9/2021), or if symptoms worsen or fail to improve,  "for Annual Wellness Visit, Medication Recheck Visit, Follow Up Chronic, Follow Up Acute.    Health Maintenance Due   Topic Date Due     ZOSTER IMMUNIZATION (2 of 3) 01/28/2016     ASTHMA ACTION PLAN  08/22/2017     ASTHMA CONTROL TEST  02/08/2020     MEDICARE ANNUAL WELLNESS VISIT  08/08/2020     BMP  08/08/2020     LIPID  08/08/2020     MICROALBUMIN  08/08/2020     PSA  08/08/2020     FALL RISK ASSESSMENT  08/08/2020     COLORECTAL CANCER SCREENING  08/20/2020     INFLUENZA VACCINE (1) 09/01/2020       End of Life Planning:  Patient currently has an advanced directive: pending    COUNSELING    Reviewed preventive health counseling, as reflected in patient instructions    Estimated body mass index is 26.76 kg/m  as calculated from the following:    Height as of this encounter: 1.727 m (5' 8\").    Weight as of this encounter: 79.8 kg (176 lb).         reports that he has never smoked. He has never used smokeless tobacco.      Appropriate preventive services were discussed with this patient, including applicable screening as appropriate for cardiovascular disease, diabetes, osteopenia/osteoporosis, and glaucoma.  As appropriate for age/gender, discussed screening for colorectal cancer, prostate cancer, breast cancer, and cervical cancer. Checklist reviewing preventive services available has been given to the patient.    Reviewed patients plan of care and provided an AVS. The Intermediate Care Plan ( asthma action plan, low back pain action plan, and migraine action plan) for Alexander meets the Care Plan requirement. This Care Plan has been established and reviewed with the Patient.         Messi Saldana MD, FAAFP     [facility]  [address] [place] [id number]  [email or web address]   Office: [phone]  Fax: [phone]  Pager: [phone]       "

## 2020-09-09 NOTE — PATIENT INSTRUCTIONS
Patient Education   Personalized Prevention Plan  You are due for the preventive services outlined below.  Your care team is available to assist you in scheduling these services.  If you have already completed any of these items, please share that information with your care team to update in your medical record.  Health Maintenance Due   Topic Date Due     Zoster (Shingles) Vaccine (2 of 3) 01/28/2016     Asthma Action Plan - yearly  08/22/2017     Asthma Control Test  02/08/2020     Annual Wellness Visit  08/08/2020     Basic Metabolic Panel  08/08/2020     Cholesterol Lab  08/08/2020     Kidney Microalbumin Urine Test  08/08/2020     Prostate Test  08/08/2020     FALL RISK ASSESSMENT  08/08/2020     Colorectal Cancer Screening  08/20/2020     Flu Vaccine (1) 09/01/2020     Preventive Health Recommendations  See your health care provider every year to    Review health changes.     Discuss preventive care.      Review your medicines if your doctor has prescribed any.    Talk with your health care provider about whether you should have a test to screen for prostate cancer (PSA).    Every 3 years, have a diabetes test (fasting glucose). If you are at risk for diabetes, you should have this test more often.    Every 5 years, have a cholesterol test. Have this test more often if you are at risk for high cholesterol or heart disease.     Every 10 years, have a colonoscopy. Or, have a yearly FIT test (stool test). These exams will check for colon cancer.    Talk to with your health care provider about screening for Abdominal Aortic Aneurysm if you have a family history of AAA or have a history of smoking.    Shots:     Get a flu shot each year.     Get a tetanus shot every 10 years.     Talk to your doctor about your pneumonia vaccines. There are now two you should receive - Pneumovax (PPSV 23) and Prevnar (PCV 13).    Talk to your pharmacist about a shingles vaccine.     Talk to your doctor about the hepatitis B  vaccine.    Nutrition:     Eat at least 5 servings of fruits and vegetables each day.     Eat whole-grain bread, whole-wheat pasta and brown rice instead of white grains and rice.     Get adequate Calcium and Vitamin D.     Lifestyle    Exercise for at least 150 minutes a week (30 minutes a day, 5 days a week). This will help you control your weight and prevent disease.     Limit alcohol to one drink per day.     No smoking.     Wear sunscreen to prevent skin cancer.     See your dentist every six months for an exam and cleaning.     See your eye doctor every 1 to 2 years to screen for conditions such as glaucoma, macular degeneration and cataracts.    Personalized Prevention Plan  You are due for the preventive services outlined below.  Your care team is available to assist you in scheduling these services.  If you have already completed any of these items, please share that information with your care team to update in your medical record.  Health Maintenance   Topic Date Due     ZOSTER IMMUNIZATION (2 of 3) 01/28/2016     ASTHMA ACTION PLAN  08/22/2017     ASTHMA CONTROL TEST  02/08/2020     MEDICARE ANNUAL WELLNESS VISIT  08/08/2020     BMP  08/08/2020     LIPID  08/08/2020     MICROALBUMIN  08/08/2020     PSA  08/08/2020     FALL RISK ASSESSMENT  08/08/2020     COLORECTAL CANCER SCREENING  08/20/2020     INFLUENZA VACCINE (1) 09/01/2020     DTAP/TDAP/TD IMMUNIZATION (3 - Td) 06/21/2022     ADVANCE CARE PLANNING  09/09/2025     HEPATITIS C SCREENING  Completed     PHQ-2  Completed     PNEUMOCOCCAL IMMUNIZATION 65+ LOW/MEDIUM RISK  Completed     AORTIC ANEURYSM SCREENING (SYSTEM ASSIGNED)  Completed     IPV IMMUNIZATION  Aged Out     MENINGITIS IMMUNIZATION  Aged Out     HEPATITIS B IMMUNIZATION  Aged Out

## 2020-09-10 LAB — PSA SERPL-ACNC: 0.83 UG/L (ref 0–4)

## 2020-09-10 ASSESSMENT — ASTHMA QUESTIONNAIRES: ACT_TOTALSCORE: 25

## 2020-09-29 ENCOUNTER — ALLIED HEALTH/NURSE VISIT (OUTPATIENT)
Dept: NURSING | Facility: CLINIC | Age: 75
End: 2020-09-29
Payer: COMMERCIAL

## 2020-09-29 VITALS
HEART RATE: 60 BPM | OXYGEN SATURATION: 96 % | DIASTOLIC BLOOD PRESSURE: 52 MMHG | RESPIRATION RATE: 18 BRPM | SYSTOLIC BLOOD PRESSURE: 128 MMHG

## 2020-09-29 DIAGNOSIS — Z00.00 ENCOUNTER FOR ROUTINE ADULT HEALTH EXAMINATION WITHOUT ABNORMAL FINDINGS: ICD-10-CM

## 2020-09-29 DIAGNOSIS — Z12.11 SCREEN FOR COLON CANCER: ICD-10-CM

## 2020-09-29 DIAGNOSIS — Z01.30 BP CHECK: ICD-10-CM

## 2020-09-29 DIAGNOSIS — Z00.00 ENCOUNTER FOR MEDICARE ANNUAL WELLNESS EXAM: ICD-10-CM

## 2020-09-29 DIAGNOSIS — I10 HYPERTENSION GOAL BP (BLOOD PRESSURE) < 140/90: Primary | ICD-10-CM

## 2020-09-29 PROCEDURE — 82274 ASSAY TEST FOR BLOOD FECAL: CPT | Performed by: FAMILY MEDICINE

## 2020-09-29 NOTE — PROGRESS NOTES
Alexander Alfred is being followed for Blood Pressure management.      BP Readings from Last 3 Encounters:   09/29/20 128/52   09/09/20 (!) 150/72   01/27/20 (!) 161/91       Wt Readings from Last 2 Encounters:   09/09/20 79.8 kg (176 lb)   01/27/20 81.8 kg (180 lb 6.4 oz)       Is pulse 55 or greater? - Yes    Pulse Readings from Last 1 Encounters:   09/29/20 60       Current blood pressure medication(s):  Current Outpatient Medications   Medication Sig Dispense Refill     albuterol (PROAIR HFA/PROVENTIL HFA/VENTOLIN HFA) 108 (90 Base) MCG/ACT inhaler Inhale 2 puffs into the lungs every 6 hours as needed for shortness of breath / dyspnea or wheezing 3 Inhaler 3     aspirin 81 MG chewable tablet Take 1 tablet (81 mg) by mouth daily 108 tablet 3     Coenzyme Q10 (COQ-10) 100 MG CAPS Take 100 mg by mouth daily       fluticasone (FLONASE) 50 MCG/ACT nasal spray Spray 2 sprays into both nostrils daily 48 g 3     KRILL OIL PO Take 1 capsule by mouth daily Taking MegaRed OTC. Strength unknown.       losartan-hydrochlorothiazide (HYZAAR) 50-12.5 MG tablet Take 1 tablet by mouth daily 90 tablet 3     Multiple Vitamin (MULTIVITAMIN OR) Take 1 tablet by mouth daily        Probiotic Product (4X PROBIOTIC) TABS             1. Follow up instructions include:     Next Provider visit: Follow up in 6 months..      SUBJECTIVE:                                                    The patient is taking medication as prescribed and is tolerating well.   Patient is monitoring Blood Pressure at home.   Last 3 home readings   Has not done At home BP in a while.        Out of the following complicating factors: Cough, Headache, Lightheadedness, Shortness of breath, Fatigue, Nausea, Sexual Dysfunction, New onset of swelling or edema, Weakness and New onset of Chest Pain, the patient reports:  None    OBJECTIVE:                                                      Today's BP completed using cuff size: regular on left side  arm.      Potassium    Date Value Ref Range Status   09/09/2020 3.8 3.4 - 5.3 mmol/L Final     Creatinine   Date Value Ref Range Status   09/09/2020 1.15 0.66 - 1.25 mg/dL Final     Urea Nitrogen   Date Value Ref Range Status   09/09/2020 20 7 - 30 mg/dL Final     GFR Estimate   Date Value Ref Range Status   09/09/2020 62 >60 mL/min/[1.73_m2] Final     Comment:     Non  GFR Calc  Starting 12/18/2018, serum creatinine based estimated GFR (eGFR) will be   calculated using the Chronic Kidney Disease Epidemiology Collaboration   (CKD-EPI) equation.                 Education:  general discussion/verbal explanation  Ways to help improve BP/HTN:   Medications  Lose weight  Diet low in fat and rich in fruits, vegetables and low fat dairy products  Reduce salt in diet  Do something active for at least 30 minutes a day on most days of the week  Cut down on alcohol (if you drink more than 2 drinks per day)  Decrease stress (exercise, read, yoga, meditation, time for self, etc.)   Patient was given an opportunity to ask questions.    Patient verbalized understanding of this plan and is agreeable.    Farhat Gibson RN

## 2020-10-04 LAB — HEMOCCULT STL QL IA: NEGATIVE

## 2020-10-05 DIAGNOSIS — Z11.59 ENCOUNTER FOR SCREENING FOR OTHER VIRAL DISEASES: Primary | ICD-10-CM

## 2020-10-19 ENCOUNTER — AMBULATORY - HEALTHEAST (OUTPATIENT)
Dept: LAB | Facility: CLINIC | Age: 75
End: 2020-10-19

## 2020-10-19 DIAGNOSIS — Z11.59 SCREENING FOR VIRAL DISEASE: ICD-10-CM

## 2020-10-21 ENCOUNTER — HOSPITAL ENCOUNTER (OUTPATIENT)
Facility: CLINIC | Age: 75
Discharge: HOME | End: 2020-10-21
Attending: INTERNAL MEDICINE | Admitting: INTERNAL MEDICINE
Payer: COMMERCIAL

## 2020-10-21 ENCOUNTER — COMMUNICATION - HEALTHEAST (OUTPATIENT)
Dept: SCHEDULING | Facility: CLINIC | Age: 75
End: 2020-10-21

## 2020-10-21 VITALS
DIASTOLIC BLOOD PRESSURE: 69 MMHG | RESPIRATION RATE: 14 BRPM | SYSTOLIC BLOOD PRESSURE: 140 MMHG | OXYGEN SATURATION: 94 % | WEIGHT: 170 LBS | BODY MASS INDEX: 25.76 KG/M2 | HEIGHT: 68 IN | HEART RATE: 48 BPM

## 2020-10-21 LAB — COLONOSCOPY: NORMAL

## 2020-10-21 PROCEDURE — 45378 DIAGNOSTIC COLONOSCOPY: CPT | Performed by: INTERNAL MEDICINE

## 2020-10-21 PROCEDURE — 250N000011 HC RX IP 250 OP 636: Performed by: INTERNAL MEDICINE

## 2020-10-21 PROCEDURE — G0500 MOD SEDAT ENDO SERVICE >5YRS: HCPCS | Performed by: INTERNAL MEDICINE

## 2020-10-21 PROCEDURE — G0121 COLON CA SCRN NOT HI RSK IND: HCPCS | Performed by: INTERNAL MEDICINE

## 2020-10-21 RX ORDER — FLUMAZENIL 0.1 MG/ML
0.2 INJECTION, SOLUTION INTRAVENOUS
Status: DISCONTINUED | OUTPATIENT
Start: 2020-10-21 | End: 2020-10-21 | Stop reason: HOSPADM

## 2020-10-21 RX ORDER — NALOXONE HYDROCHLORIDE 0.4 MG/ML
.1-.4 INJECTION, SOLUTION INTRAMUSCULAR; INTRAVENOUS; SUBCUTANEOUS
Status: DISCONTINUED | OUTPATIENT
Start: 2020-10-21 | End: 2020-10-21 | Stop reason: HOSPADM

## 2020-10-21 RX ORDER — ONDANSETRON 2 MG/ML
4 INJECTION INTRAMUSCULAR; INTRAVENOUS EVERY 6 HOURS PRN
Status: DISCONTINUED | OUTPATIENT
Start: 2020-10-21 | End: 2020-10-21 | Stop reason: HOSPADM

## 2020-10-21 RX ORDER — PROCHLORPERAZINE MALEATE 5 MG
5 TABLET ORAL EVERY 6 HOURS PRN
Status: DISCONTINUED | OUTPATIENT
Start: 2020-10-21 | End: 2020-10-21 | Stop reason: HOSPADM

## 2020-10-21 RX ORDER — ONDANSETRON 4 MG/1
4 TABLET, ORALLY DISINTEGRATING ORAL EVERY 6 HOURS PRN
Status: DISCONTINUED | OUTPATIENT
Start: 2020-10-21 | End: 2020-10-21 | Stop reason: HOSPADM

## 2020-10-21 RX ORDER — LIDOCAINE 40 MG/G
CREAM TOPICAL
Status: DISCONTINUED | OUTPATIENT
Start: 2020-10-21 | End: 2020-10-21 | Stop reason: HOSPADM

## 2020-10-21 RX ORDER — ONDANSETRON 2 MG/ML
4 INJECTION INTRAMUSCULAR; INTRAVENOUS
Status: DISCONTINUED | OUTPATIENT
Start: 2020-10-21 | End: 2020-10-21 | Stop reason: HOSPADM

## 2020-10-21 RX ORDER — FENTANYL CITRATE 50 UG/ML
INJECTION, SOLUTION INTRAMUSCULAR; INTRAVENOUS PRN
Status: DISCONTINUED | OUTPATIENT
Start: 2020-10-21 | End: 2020-10-21 | Stop reason: HOSPADM

## 2020-10-21 ASSESSMENT — MIFFLIN-ST. JEOR: SCORE: 1485.61

## 2020-10-21 NOTE — LETTER
October 6, 2020      Alexander Alfred  [address]  [place] [address]         Please be aware that coverage of these services is subject to the terms and limitations of your health insurance plan.  Call member services at your health plan with any benefit or coverage questions.    Thank you for choosing [facility]. You are scheduled for the following service(s):    Date:  Wednesday October 21, 2020             Procedure:  COLONOSCOPY  Doctor:        Dr Ramirez   Arrival Time:  1100  *Enter and check in at the Main Hospital Entrance*  Procedure Time:  1130      Location:   [facility]        Endoscopy Department, First Floor         [address] [place], [place] [address]      [phone] or [phone] () to reschedule      MIRALAX -GATORADE  PREP  Colonoscopy is the most accurate test to detect colon polyps and colon cancer; and the only test where polyps can be removed. During this procedure, a doctor examines the lining of your large intestine and rectum through a flexible tube.   Transportation  You must arrange for a ride for the day of your procedure with a responsible adult. A taxi , Uber, etc, is not an option unless you are accompanied by a responsible adult. If you fail to arrange transportation with a responsible adult, your procedure will be cancelled and rescheduled.    Purchase the  following supplies at your local pharmacy:  - 2 (two) bisacodyl tablets: each tablet contains 5 mg.  (Dulcolax  laxative NOT Dulcolax  stool softener)   - 1 (one) 8.3 oz bottle of Polyethylene Glycol (PEG) 3350 Powder   (MiraLAX , Smooth LAX , ClearLAX  or equivalent)  - 64 oz Gatorade    Regular Gatorade, Gatorade G2 , Powerade , Powerade Zero  or Pedialyte  is acceptable. Red colored flavors are not allowed; all other colors (yellow, green, orange, purple and blue) are okay. It is also okay to buy two 2.12 oz packets of powdered Gatorade that can be mixed  with water to a total volume of 64 oz of liquid.  - 1 (one) 10 oz bottle of Magnesium Citrate (Red colored flavors are not allowed)  It is also okay for you to use a 0.5 oz package of powdered magnesium citrate (17 g) mixed with 10 oz of water.      PREPARATION FOR COLONOSCOPY    7 days before:    Discontinue fiber supplements and medications containing iron. This includes Metamucil  and Fibercon ; and multivitamins with iron.    3 days before:    Begin a low-fiber diet. A low-fiber diet helps making the cleanout more effective.     Examples of a low-fiber diet include (but are not limited to): white bread, white rice, pasta, crackers, fish, chicken, eggs, ground beef, creamy peanut butter, cooked/steamed/boiled vegetables, canned fruit, bananas, melons, milk, plain yogurt cheese, salad dressing and other condiments.     The following are not allowed on a low-fiber diet: seeds, nuts, popcorn, bran, whole wheat, corn, quinoa, raw fruits and vegetables, berries and dried fruit, beans and lentils.    For additional details on low-fiber diet, please refer to the table on the last page.    2 days before:    Continue the low-fiber diet.     Drink at least 8 glasses of water throughout the day.     Stop eating solid foods at 11:45 pm.    1 day before:    In the morning: begin a clear liquid diet (liquids you can see through).     Examples of a clear liquid diet include: water, clear broth or bouillon, Gatorade, Pedialyte or Powerade, carbonated and non-carbonated soft drinks (Sprite , 7-Up , ginger ale), strained fruit juices without pulp (apple, white grape, white cranberry), Jell-O  and popsicles.     The following are not allowed on a clear liquid diet: red liquids, alcoholic beverages, dairy products (milk, creamer, and yogurt), protein shakes, creamy broths, juice with pulp and chewing tobacco.    At noon: take 2 (two) bisacodyl tablets     At 4 (and no later than 6pm): start drinking the Miralax-Gatorade preparation  (8.3 oz of Miralax mixed with 64 oz of Gatorade in a large pitcher). Drink 1(one) 8 oz glass every 15 minutes thereafter, until the mixture is gone.    COLON CLEANSING TIPS: drink adequate amounts of fluids before and after your colon cleansing to prevent dehydration. Stay near a toilet because you will have diarrhea. Even if you are sitting on the toilet, continue to drink the cleansing solution every 15 minutes. If you feel nauseous or vomit, rinse your mouth with water, take a 15 to 30-minute-break and then continue drinking the solution. You will be uncomfortable until the stool has flushed from your colon (in about 2 to 4 hours). You may feel chilled.    Day of your procedure  You may take all of your morning medications including blood pressure medications, blood thinners (if you have not been instructed to stop these by our office), methadone, anti-seizure medications with sips of water 3 hours prior to your procedure or earlier. Do not take insulin or vitamins prior to your procedure. Continue the clear liquid diet.       4 hours prior: drink 10 oz of magnesium citrate. It may be easier to drink it with a straw.    STOP consuming all liquids after that.     Do not take anything by mouth during this time.     Allow extra time to travel to your procedure as you may need to stop and use a restroom along the way.    You are ready for the procedure, if you followed all instructions and your stool is no longer formed, but clear or yellow liquid. If you are unsure whether your colon is clean, please call our office at [phone] before you leave for your appointment.    Bring the following to your procedure:  - Insurance Card/Photo ID.   - List of current medications including over-the-counter medications and supplements.   - Your rescue inhaler if you currently use one to control asthma.    Canceling or rescheduling your appointment:   If you must cancel or reschedule your appointment, please call [phone]  as soon as possible.      COLONOSCOPY PRE-PROCEDURE CHECKLIST    If you have diabetes, ask your regular doctor for diet and medication restrictions.  If you take an anticoagulant or anti-platelet medication (such as Coumadin , Lovenox , Pradaxa , Xarelto , Eliquis , etc.), please call your primary doctor for advice on holding this medication.  If you take aspirin you may continue to do so.  If you are or may be pregnant, please discuss the risks and benefits of this procedure with your doctor.        What happens during a colonoscopy?    Plan to spend up to two hours, starting at registration time, at the endoscopy center the day of your procedure. The colonoscopy takes an average of 15 to 30 minutes. Recovery time is about 30 minutes.      Before the exam:    You will change into a gown.    Your medical history and medication list will be reviewed with you, unless that has been done over the phone prior to the procedure.     A nurse will insert an intravenous (IV) line into your hand or arm.    The doctor will meet with you and will give you a consent form to sign.  During the exam:     Medicine will be given through the IV line to help you relax.     Your heart rate and oxygen levels will be monitored. If your blood pressure is low, you may be given fluids through the IV line.     The doctor will insert a flexible hollow tube, called a colonoscope, into your rectum. The scope will be advanced slowly through the large intestine (colon).    You may have a feeling of fullness or pressure.     If an abnormal tissue or a polyp is found, the doctor may remove it through the endoscope for closer examination, or biopsy. Tissue removal is painless    After the exam:           Any tissue samples removed during the exam will be sent to a lab for evaluation. It may take 5-7 working days for you to be notified of the results.     A nurse will provide you with complete discharge instructions before you leave the endoscopy  center. Be sure to ask the nurse for specific instructions if you take blood thinners such as Aspirin, Coumadin or Plavix.     The doctor will prepare a full report for you and for the physician who referred you for the procedure.     Your doctor will talk with you about the initial results of your exam.      Medication given during the exam will prohibit you from driving for the rest of the day.     Following the exam, you may resume your normal diet. Your first meal should be light, no greasy foods. Avoid alcohol until the next day.     You may resume your regular activities the day after the procedure.         LOW-FIBER DIET    Foods RECOMMENDED Foods to AVOID   Breads, Cereal, Rice and Pasta:   White bread, rolls, biscuits, croissant and jose a toast.   Waffles, Bengali toast and pancakes.   White rice, noodles, pasta, macaroni and peeled cooked potatoes.   Plain crackers and saltines.   Cooked cereals: farina, cream of rice.   Cold cereals: Puffed Rice , Rice Krispies , Corn Flakes  and Special K    Breads, Cereal, Rice and Pasta:   Breads or rolls with nuts, seeds or fruit.   Whole wheat, pumpernickel, rye breads and cornbread.   Potatoes with skin, brown or wild rice, and kasha (buckwheat).     Vegetables:   Tender cooked and canned vegetables without seeds: carrots, asparagus tips, green or wax beans, pumpkin, spinach, lima beans. Vegetables:   Raw or steamed vegetables.   Vegetables with seeds.   Sauerkraut.   Winter squash, peas, broccoli, Brussel sprouts, cabbage, onions, cauliflower, baked beans, peas and corn.   Fruits:   Strained fruit juice.   Canned fruit, except pineapple.   Ripe bananas and melon. Fruits:   Prunes and prune juice.   Raw fruits.   Dried fruits: figs, dates and raisins.   Milk/Dairy:   Milk: plain or flavored.   Yogurt, custard and ice cream.   Cheese and cottage cheese Milk/Dairy:     Meat and other proteins:   ground, well-cooked tender beef, lamb, ham, veal, pork, fish, poultry and  organ meats.   Eggs.   Peanut butter without nuts. Meat and other proteins:   Tough, fibrous meats with gristle.   Dry beans, peas and lentils.   Peanut butter with nuts.   Tofu.   Fats, Snack, Sweets, Condiments and Beverages:   Margarine, butter, oils, mayonnaise, sour cream and salad dressing, plain gravy.   Sugar, hard candy, clear jelly, honey and syrup.   Spices, cooked herbs, bouillon, broth and soups made with allowed vegetable, ketchup and mustard.   Coffee, tea and carbonated drinks.   Plain cakes, cookies and pretzels.   Gelatin, plain puddings, custard, ice cream, sherbet and popsicles. Fats, Snack, Sweets, Condiments and Beverages:   Nuts, seeds and coconut.   Jam, marmalade and preserves.   Pickles, olives, relish and horseradish.   All desserts containing nuts, seeds, dried fruit and coconut; or made from whole grains or bran.   Candy made with nuts or seeds.   Popcorn.         DIRECTIONS TO THE ENDOSCOPY DEPARTMENT    From the north ([facility])  Take [address], exit on [address] [address]. Get into the left hand paco, turn left (east), go one-half mile to [address] and turn left. Go north to the second stoplight, take a right on [address] and follow it to the Main Hospital entrance.    From the south ([facility])  Take 35N to the [address] split and exit on [address] [address]. On [address] [address], turn left (west) to [address]. Turn right (north) on [address]. Go north to the second stoplight, take a right on [address] and follow it to the Main Hospital entrance.    From the east via 35E ([facility])  Take [address] south to [address] [address] exit. Turn right on [address] [address]. Go west to [address]. Turn right (north) on [address]. Go to the second stoplight, take a right on [address] to the Main Hospital entrance.    From the east via [address] ([facility])  Take [address] to [address]. Turn left (south) on  [address] to [address], turn left (east) on [address] and follow it to the Main Hospital entrance.    From the west via [address] ([place])  Take [address] east to [address]. Turn right (south) on [address] to [address], turn left (east) on [address] and follow it to the Main Hospital entrance.2

## 2020-10-21 NOTE — H&P
Pre-Endoscopy History and Physical     Alexander Alfred MRN# [id number]   YOB: 1945 Age: 75 year old     Date of Procedure: 10/21/2020  Primary care provider: Messi Saldana  Type of Endoscopy: Colonoscopy with possible biopsy, possible polypectomy  Reason for Procedure: screen  Type of Anesthesia Anticipated: Conscious Sedation    HPI:    Alexander is a 75 year old male who will be undergoing the above procedure.      A history and physical has been performed. The patient's medications and allergies have been reviewed. The risks and benefits of the procedure and the sedation options and risks were discussed with the patient.  All questions were answered and informed consent was obtained.      He denies a personal or family history of anesthesia complications or bleeding disorders.     Patient Active Problem List   Diagnosis     Nasal polyps     Asthma, mild intermittent     Sleep apnea     Hyperlipidemia LDL goal <130     Hypertension goal BP (blood pressure) < 140/90     EE (eosinophilic esophagitis)     Osteoarthritis of left hip     Lumbar facet arthropathy     Lumbar degenerative disc disease     Lumbar pain     Left lumbosacral radiculopathy     Spondylolisthesis of lumbar region     Lumbar foraminal stenosis     Central spinal stenosis     Spinal stenosis of lumbar region at multiple levels     Acquired spondylolisthesis     Closed traumatic nondisplaced fracture of distal end of left fibula     Left ankle pain     Ankle fracture     Advanced directives, counseling/discussion     Lumbar stenosis     Abnormal joint on examination     Allergic rhinitis     Carpal tunnel syndrome     HNP (herniated nucleus pulposus), thoracic     Memory loss     Ostium secundum type atrial septal defect        Past Medical History:   Diagnosis Date     Asthma, mild intermittent     [place] asthma     EE (eosinophilic esophagitis) 8/13    Dr Ramirez     Hyperlipidemia LDL goal <130      Hypertension goal BP (blood  pressure) < 140/90      Lumbar stenosis     Dr Knight - Calf weakness/atrophy     Nasal polyps 2004    approx 2/year - increased sx from nasal polyps - prednisone 40 mg x 2 days, 20 mg x 2 days, 10mg x 10 days with excellent results     Other chronic pain      Sleep apnea 2006    moderate - CPAP - 5-15 cm        Past Surgical History:   Procedure Laterality Date     COLONOSCOPY  2010    normal - due 2020     DECOMPRESSION, FUSION LUMBAR POSTERIOR THREE + LEVELS, COMBINED N/A 10/7/2014    Dr Knight - COMBINED DECOMPRESSION, FUSION LUMBAR POSTERIOR THREE + LEVELS     ESOPHAGOSCOPY, GASTROSCOPY, DUODENOSCOPY (EGD), COMBINED  8/7/2013    Dr Ramirez - EE, non erosive gastropathy     ROTATOR CUFF REPAIR RT/LT Left 2005    Lt shoulder - dr armaan lazar     ROTATOR CUFF REPAIR RT/LT Left 05/2009    Lt shoulder - dr armaan lazar     stress echo  7/11    normal     STRESS ECHO ([facility])  6/15    normal stress echo     SURGICAL HISTORY OF -   1982    Lt ankle CRIF     SURGICAL HISTORY OF -   4/08    blephoplasty     SURGICAL HISTORY OF -   11/16    T12-L1 - Decompression - Dr Knight -      TONSILLECTOMY  1950       Social History     Tobacco Use     Smoking status: Never Smoker     Smokeless tobacco: Never Used   Substance Use Topics     Alcohol use: Yes     Alcohol/week: 11.7 standard drinks     Types: 14 Standard drinks or equivalent per week     Comment: 2 drinks per day avg       Family History   Problem Relation Age of Onset     Hypertension Father      C.A.D. Father         smoker     C.A.D. Cousin         cousin - at 45     Hypertension Maternal Grandfather      C.A.D. Maternal Grandfather      Coronary Artery Disease Maternal Uncle      Coronary Artery Disease Maternal Uncle      Colon Cancer No family hx of        Prior to Admission medications    Medication Sig Start Date End Date Taking? Authorizing Provider   albuterol (PROAIR HFA/PROVENTIL HFA/VENTOLIN HFA) 108 (90 Base) MCG/ACT inhaler Inhale 2 puffs into the  "lungs every 6 hours as needed for shortness of breath / dyspnea or wheezing 9/9/20   Messi Saldana MD   aspirin 81 MG chewable tablet Take 1 tablet (81 mg) by mouth daily 7/1/16   Messi Saldana MD   Coenzyme Q10 (COQ-10) 100 MG CAPS Take 100 mg by mouth daily    Unknown, Entered By History   fluticasone (FLONASE) 50 MCG/ACT nasal spray Spray 2 sprays into both nostrils daily 9/9/20   Messi Saldana MD   KRILL OIL PO Take 1 capsule by mouth daily Taking MegaRed OTC. Strength unknown.    Unknown, Entered By History   losartan-hydrochlorothiazide (HYZAAR) 50-12.5 MG tablet Take 1 tablet by mouth daily 9/9/20   Messi Saldana MD   Multiple Vitamin (MULTIVITAMIN OR) Take 1 tablet by mouth daily     Reported, Patient   Probiotic Product (4X PROBIOTIC) TABS  8/3/18   Messi Saldana MD       Allergies   Allergen Reactions     Codeine GI Disturbance     Hydrocodone GI Disturbance     Oxycodone GI Disturbance        REVIEW OF SYSTEMS:   5 point ROS negative except as noted above in HPI, including Gen., Resp., CV, GI &  system review.    PHYSICAL EXAM:   There were no vitals taken for this visit. Estimated body mass index is 26.76 kg/m  as calculated from the following:    Height as of 9/9/20: 1.727 m (5' 8\").    Weight as of 9/9/20: 79.8 kg (176 lb).   GENERAL APPEARANCE: alert, and oriented  MENTAL STATUS: alert  AIRWAY EXAM: Mallampatti Class I (visualization of the soft palate, fauces, uvula, anterior and posterior pillars)  RESP: lungs clear to auscultation - no rales, rhonchi or wheezes  CV: regular rates and rhythm  DIAGNOSTICS:    Not indicated    IMPRESSION   ASA Class 2 - Mild systemic disease    PLAN:   Plan for Colonoscopy with possible biopsy, possible polypectomy. We discussed the risks, benefits and alternatives and the patient wished to proceed.    The above has been forwarded to the consulting provider.      Signed Electronically by: Teo Ramirez MD  October 21, 2020          "

## 2020-11-24 ENCOUNTER — TRANSFERRED RECORDS (OUTPATIENT)
Dept: HEALTH INFORMATION MANAGEMENT | Facility: CLINIC | Age: 75
End: 2020-11-24

## 2020-12-01 ENCOUNTER — OFFICE VISIT (OUTPATIENT)
Dept: PODIATRY | Facility: CLINIC | Age: 75
End: 2020-12-01
Payer: COMMERCIAL

## 2020-12-01 VITALS
BODY MASS INDEX: 25.76 KG/M2 | SYSTOLIC BLOOD PRESSURE: 130 MMHG | WEIGHT: 170 LBS | DIASTOLIC BLOOD PRESSURE: 80 MMHG | HEIGHT: 68 IN

## 2020-12-01 DIAGNOSIS — M21.611 BILATERAL BUNIONS: Primary | ICD-10-CM

## 2020-12-01 DIAGNOSIS — M21.612 BILATERAL BUNIONS: Primary | ICD-10-CM

## 2020-12-01 PROCEDURE — 99203 OFFICE O/P NEW LOW 30 MIN: CPT | Performed by: PODIATRIST

## 2020-12-01 ASSESSMENT — MIFFLIN-ST. JEOR: SCORE: 1485.61

## 2020-12-01 NOTE — LETTER
"    12/1/2020         RE: Alexander Alfred  [address] [facility] [address]        Dear Colleague,    Thank you for referring your patient, Alexander Alfred, to the [facility]. Please see a copy of my visit note below.    Foot & Ankle Surgery  December 1, 2020    CC: \"bunions\"    I was asked to see Alexander Alfred regarding the chief complaint by:  Dr. YULIANA Saldana    HPI:  Pt is a 75 year old male who presents with above complaint.  Bilateral bunions for 7-8 years.  Looking for \"home remedy\".  \"no pain\".  \"self massage\" for treatment.  Feet hurt with hockey skates, certain shoes.  Now has 4E shoes.  \"comfortable but my feet slide around a bit\".  Wants to know what he can do to undo/stop bunion progression without surgery.  Nerve issue with calf muscle, \"inoperative\", muscle \"atrophied\".  Had spinal surgery, wonders if this is causing right bunion.      ROS:   Pos for CC.  The patient denies current nausea, vomiting, chills, fevers, belly pain, calf pain, chest pain or SOB.  Complete remainder of ROS is otherwise neg.    VITALS:    Vitals:    12/01/20 1547   BP: 130/80   Weight: 77.1 kg (170 lb)   Height: 1.727 m (5' 8\")       PMH:    Past Medical History:   Diagnosis Date     Asthma, mild intermittent     Park nicollet asthma     EE (eosinophilic esophagitis) 08/2013    Dr Ramirez     Hyperlipidemia LDL goal <130      Hypertension goal BP (blood pressure) < 140/90      Lumbar stenosis     Dr Knight - Calf weakness/atrophy     Nasal polyps 2004    approx 2/year - increased sx from nasal polyps - prednisone 40 mg x 2 days, 20 mg x 2 days, 10mg x 10 days with excellent results     Other chronic pain      Sensorineural hearing loss (SNHL) of both ears     Dr Hidalgo     Sleep apnea 2006    moderate - CPAP - 5-15 cm       SXHX:    Past Surgical History:   Procedure Laterality Date     COLONOSCOPY  2010    normal - due 2020     COLONOSCOPY N/A 10/21/2020    normal, consider 10 yrs     " DECOMPRESSION, FUSION LUMBAR POSTERIOR THREE + LEVELS, COMBINED N/A 10/07/2014    Dr Knight - COMBINED DECOMPRESSION, FUSION LUMBAR POSTERIOR THREE + LEVELS     ESOPHAGOSCOPY, GASTROSCOPY, DUODENOSCOPY (EGD), COMBINED  08/07/2013    Dr Ramirez - EE, non erosive gastropathy     ROTATOR CUFF REPAIR RT/LT Left 2005    Lt shoulder - dr armaan lazar     ROTATOR CUFF REPAIR RT/LT Left 05/2009    Lt shoulder - dr armaan lazar     stress echo  07/2011    normal     STRESS ECHO ([facility])  06/2015    normal stress echo     SURGICAL HISTORY OF -   1982    Lt ankle CRIF     SURGICAL HISTORY OF -   04/2008    blephoplasty     SURGICAL HISTORY OF -   11/2016    T12-L1 - Decompression - Dr Knight -      TONSILLECTOMY  1950        MEDS:    Current Outpatient Medications   Medication     albuterol (PROAIR HFA/PROVENTIL HFA/VENTOLIN HFA) 108 (90 Base) MCG/ACT inhaler     aspirin 81 MG chewable tablet     Coenzyme Q10 (COQ-10) 100 MG CAPS     fluticasone (FLONASE) 50 MCG/ACT nasal spray     KRILL OIL PO     losartan-hydrochlorothiazide (HYZAAR) 50-12.5 MG tablet     Multiple Vitamin (MULTIVITAMIN OR)     Probiotic Product (4X PROBIOTIC) TABS     No current facility-administered medications for this visit.        ALL:     Allergies   Allergen Reactions     Codeine GI Disturbance     Hydrocodone GI Disturbance     Oxycodone GI Disturbance       FMH:    Family History   Problem Relation Age of Onset     Hypertension Father      C.A.D. Father         smoker     C.A.D. Cousin         cousin - at 45     Hypertension Maternal Grandfather      C.A.D. Maternal Grandfather      Coronary Artery Disease Maternal Uncle      Coronary Artery Disease Maternal Uncle      Colon Cancer No family hx of        SocHx:    Social History     Socioeconomic History     Marital status:      Spouse name: Valeria     Number of children: 3     Years of education: 16     Highest education level: Not on file   Occupational History     Employer: [employer]   Social  Needs     Financial resource strain: Not on file     Food insecurity     Worry: Not on file     Inability: Not on file     Transportation needs     Medical: Not on file     Non-medical: Not on file   Tobacco Use     Smoking status: Never Smoker     Smokeless tobacco: Never Used   Substance and Sexual Activity     Alcohol use: Yes     Alcohol/week: 11.7 standard drinks     Types: 14 Standard drinks or equivalent per week     Comment: 2 drinks per day avg     Drug use: No     Sexual activity: Yes     Partners: Female     Birth control/protection: Rhythm, Male Surgical   Lifestyle     Physical activity     Days per week: Not on file     Minutes per session: Not on file     Stress: Not on file   Relationships     Social connections     Talks on phone: Not on file     Gets together: Not on file     Attends Pentecostalism service: Not on file     Active member of club or organization: Not on file     Attends meetings of clubs or organizations: Not on file     Relationship status: Not on file     Intimate partner violence     Fear of current or ex partner: Not on file     Emotionally abused: Not on file     Physically abused: Not on file     Forced sexual activity: Not on file   Other Topics Concern     Parent/sibling w/ CABG, MI or angioplasty before 65F 55M? Yes      Service Not Asked     Blood Transfusions Not Asked     Caffeine Concern Yes     Comment: 2-3 cups daily     Occupational Exposure Not Asked     Hobby Hazards Not Asked     Sleep Concern Not Asked     Stress Concern Not Asked     Weight Concern Not Asked     Special Diet Not Asked     Back Care Not Asked     Exercise Yes     Comment: 3+/wk, landscaping, Mtn Bike riding     Bike Helmet Not Asked     Seat Belt Yes     Self-Exams Not Asked   Social History Narrative     Not on file           EXAMINATION:  Gen:   No apparent distress  Neuro:   A&Ox3, no deficits  Psych:    Answering questions appropriately for age and situation with normal affect  Head:     "NCAT  Eye:    Visual scanning without deficit  Ear:    Response to auditory stimuli wnl  Lung:    Non-labored breathing on RA noted  Abd:    NTND per patient report  Lymph:    Neg for pitting/non-pitting edema BLE  Vasc:    Pulses palpable, CFT minimally delayed  Neuro:    Light touch sensation intact to all sensory nerve distributions without paresthesias  Derm:    Neg for nodules, lesions or ulcerations  MSK:    Bunion deformity bilateral, nearly fully reducible, with PROM > 45 degrees with minimal intra-articular pain. No 2nd ray or midfoot pain noted.    Calf:    Neg for redness, swelling or tenderness    Assessment:  75 year old male with bilateral bunion, minimally painful      Plan:  Discussed etiologies, anatomy and options  1.  Bilateral bunion, minimally painful  -non-surgically, discussed accommodative shoe gear, padding, splints discussed.  We discussed that these will not fix or \"undo\" the bunion, which he was led to believe.  Inserts in theory can slow progression of bunion by providing support to medial longitudinal arch, but they will not stop or reverse the bunion.  Inserts can potentially exacerbate the situation, by consuming space in the shoes and making shoe gear tighter.  Discontinue if this is the case.  -surgically, discussed options.  1st MPJ vs 1st TMT fusions most common procedures I perform.  While the symptoms of the bunion are not too severe, he did indicate that he has had to give up or limit skiing and skating because of the discomfort in the boot.  Advised proceeding with surgery if symptoms are severe enough, or if quality of life suffers because he's not able to do the things he wants to do.    Follow up:  prn or sooner with acute issues      Patient's medical history was reviewed today      Chato Jeong, ALISIA FACFAS FACFAOM  Podiatric Foot & Ankle Surgeon  [facility]  [phone]          Again, thank you for allowing me to participate in the care of your " patient.        Sincerely,        Chato Jeong DPM, ALISIA

## 2020-12-01 NOTE — PROGRESS NOTES
"Foot & Ankle Surgery  December 1, 2020    CC: \"bunions\"    I was asked to see Aelxander Alfred regarding the chief complaint by:  Dr. YULIANA Saldana    HPI:  Pt is a 75 year old male who presents with above complaint.  Bilateral bunions for 7-8 years.  Looking for \"home remedy\".  \"no pain\".  \"self massage\" for treatment.  Feet hurt with hockey skates, certain shoes.  Now has 4E shoes.  \"comfortable but my feet slide around a bit\".  Wants to know what he can do to undo/stop bunion progression without surgery.  Nerve issue with calf muscle, \"inoperative\", muscle \"atrophied\".  Had spinal surgery, wonders if this is causing right bunion.      ROS:   Pos for CC.  The patient denies current nausea, vomiting, chills, fevers, belly pain, calf pain, chest pain or SOB.  Complete remainder of ROS is otherwise neg.    VITALS:    Vitals:    12/01/20 1547   BP: 130/80   Weight: 77.1 kg (170 lb)   Height: 1.727 m (5' 8\")       PMH:    Past Medical History:   Diagnosis Date     Asthma, mild intermittent     Park nicollet asthma     EE (eosinophilic esophagitis) 08/2013    Dr Ramirez     Hyperlipidemia LDL goal <130      Hypertension goal BP (blood pressure) < 140/90      Lumbar stenosis     Dr Knight - Calf weakness/atrophy     Nasal polyps 2004    approx 2/year - increased sx from nasal polyps - prednisone 40 mg x 2 days, 20 mg x 2 days, 10mg x 10 days with excellent results     Other chronic pain      Sensorineural hearing loss (SNHL) of both ears     Dr Hidalgo     Sleep apnea 2006    moderate - CPAP - 5-15 cm       SXHX:    Past Surgical History:   Procedure Laterality Date     COLONOSCOPY  2010    normal - due 2020     COLONOSCOPY N/A 10/21/2020    normal, consider 10 yrs     DECOMPRESSION, FUSION LUMBAR POSTERIOR THREE + LEVELS, COMBINED N/A 10/07/2014    Dr Knight - COMBINED DECOMPRESSION, FUSION LUMBAR POSTERIOR THREE + LEVELS     ESOPHAGOSCOPY, GASTROSCOPY, DUODENOSCOPY (EGD), COMBINED  08/07/2013    Dr Ramirez - EE, non erosive " gastropathy     ROTATOR CUFF REPAIR RT/LT Left 2005    Lt shoulder - dr armaan lazar     ROTATOR CUFF REPAIR RT/LT Left 05/2009    Lt shoulder - dr armaan lazar     stress echo  07/2011    normal     STRESS ECHO ([facility])  06/2015    normal stress echo     SURGICAL HISTORY OF -   1982    Lt ankle CRIF     SURGICAL HISTORY OF -   04/2008    blephoplasty     SURGICAL HISTORY OF -   11/2016    T12-L1 - Decompression - Dr Knight -      TONSILLECTOMY  1950        MEDS:    Current Outpatient Medications   Medication     albuterol (PROAIR HFA/PROVENTIL HFA/VENTOLIN HFA) 108 (90 Base) MCG/ACT inhaler     aspirin 81 MG chewable tablet     Coenzyme Q10 (COQ-10) 100 MG CAPS     fluticasone (FLONASE) 50 MCG/ACT nasal spray     KRILL OIL PO     losartan-hydrochlorothiazide (HYZAAR) 50-12.5 MG tablet     Multiple Vitamin (MULTIVITAMIN OR)     Probiotic Product (4X PROBIOTIC) TABS     No current facility-administered medications for this visit.        ALL:     Allergies   Allergen Reactions     Codeine GI Disturbance     Hydrocodone GI Disturbance     Oxycodone GI Disturbance       FMH:    Family History   Problem Relation Age of Onset     Hypertension Father      C.A.D. Father         smoker     C.A.D. Cousin         cousin - at 45     Hypertension Maternal Grandfather      C.A.D. Maternal Grandfather      Coronary Artery Disease Maternal Uncle      Coronary Artery Disease Maternal Uncle      Colon Cancer No family hx of        SocHx:    Social History     Socioeconomic History     Marital status:      Spouse name: Valeria     Number of children: 3     Years of education: 16     Highest education level: Not on file   Occupational History     Employer: [employer]   Social Needs     Financial resource strain: Not on file     Food insecurity     Worry: Not on file     Inability: Not on file     Transportation needs     Medical: Not on file     Non-medical: Not on file   Tobacco Use     Smoking status: Never Smoker     Smokeless  tobacco: Never Used   Substance and Sexual Activity     Alcohol use: Yes     Alcohol/week: 11.7 standard drinks     Types: 14 Standard drinks or equivalent per week     Comment: 2 drinks per day avg     Drug use: No     Sexual activity: Yes     Partners: Female     Birth control/protection: Rhythm, Male Surgical   Lifestyle     Physical activity     Days per week: Not on file     Minutes per session: Not on file     Stress: Not on file   Relationships     Social connections     Talks on phone: Not on file     Gets together: Not on file     Attends Alevism service: Not on file     Active member of club or organization: Not on file     Attends meetings of clubs or organizations: Not on file     Relationship status: Not on file     Intimate partner violence     Fear of current or ex partner: Not on file     Emotionally abused: Not on file     Physically abused: Not on file     Forced sexual activity: Not on file   Other Topics Concern     Parent/sibling w/ CABG, MI or angioplasty before 65F 55M? Yes      Service Not Asked     Blood Transfusions Not Asked     Caffeine Concern Yes     Comment: 2-3 cups daily     Occupational Exposure Not Asked     Hobby Hazards Not Asked     Sleep Concern Not Asked     Stress Concern Not Asked     Weight Concern Not Asked     Special Diet Not Asked     Back Care Not Asked     Exercise Yes     Comment: 3+/wk, landscaping, Mtn Bike riding     Bike Helmet Not Asked     Seat Belt Yes     Self-Exams Not Asked   Social History Narrative     Not on file           EXAMINATION:  Gen:   No apparent distress  Neuro:   A&Ox3, no deficits  Psych:    Answering questions appropriately for age and situation with normal affect  Head:    NCAT  Eye:    Visual scanning without deficit  Ear:    Response to auditory stimuli wnl  Lung:    Non-labored breathing on RA noted  Abd:    NTND per patient report  Lymph:    Neg for pitting/non-pitting edema BLE  Vasc:    Pulses palpable, CFT minimally  "delayed  Neuro:    Light touch sensation intact to all sensory nerve distributions without paresthesias  Derm:    Neg for nodules, lesions or ulcerations  MSK:    Bunion deformity bilateral, nearly fully reducible, with PROM > 45 degrees with minimal intra-articular pain. No 2nd ray or midfoot pain noted.    Calf:    Neg for redness, swelling or tenderness    Assessment:  75 year old male with bilateral bunion, minimally painful      Plan:  Discussed etiologies, anatomy and options  1.  Bilateral bunion, minimally painful  -non-surgically, discussed accommodative shoe gear, padding, splints discussed.  We discussed that these will not fix or \"undo\" the bunion, which he was led to believe.  Inserts in theory can slow progression of bunion by providing support to medial longitudinal arch, but they will not stop or reverse the bunion.  Inserts can potentially exacerbate the situation, by consuming space in the shoes and making shoe gear tighter.  Discontinue if this is the case.  -surgically, discussed options.  1st MPJ vs 1st TMT fusions most common procedures I perform.  While the symptoms of the bunion are not too severe, he did indicate that he has had to give up or limit skiing and skating because of the discomfort in the boot.  Advised proceeding with surgery if symptoms are severe enough, or if quality of life suffers because he's not able to do the things he wants to do.    Follow up:  prn or sooner with acute issues      Patient's medical history was reviewed today      Chato Jeong DPM FACFAS FACFAOM  Podiatric Foot & Ankle Surgeon  [facility]  [phone]      "

## 2020-12-01 NOTE — PATIENT INSTRUCTIONS
Thank you for choosing [facility] Podiatry / Foot & Ankle Surgery!    [facility] SCHEDULE SURGERY: [phone]   [address] BILLING QUESTIONS: [phone]   [place] [address] AFTER HOURS: [phone]   PH: [phone] CONSUMER DUMONT LINE:[phone]   FAX: [phone] APPOINTMENTS: [phone]     BUNION (HALLUX ABDUCTO VALGUS)  A bunion is caused by muscle imbalance. The great toe is pulled toward the smaller toes. The metatarsal head is pushed outward creating a lump on the side of your foot. Imbalance is the result of foot structure and instability.   Bunions do not improve with time. They usually enlarge, however this is a fairly slow process. Shoes do not necessarily cause bunions, however, they can hasten development and definitely cause bunions to hurt.   Bunions often run in families. We inherit a certain foot structure, which may be predisposed to bunion development.   Bunion pain is likely a combination of shoes rubbing on the bump, nerve irritation, compression between the toes, joint misalignment, arthritis and altered gait.   SYMPTOMS   Bunions are usually termed mild, moderate or severe. Just because you have a bunion does not mean you have to have pain. There are some people with very severe bunions and no pain and people with mild bunions and a lot of pain.   - Pain on the inside of your foot at the big toe joint (1st MTPJ)   - Swelling on the inside of your foot at the big toe joint   - Redness on the inside of your foot at the big toe joint   - Numbness or burning in the big toe (hallux)   - Decreased motion at the big toe joint   - Painful bursa (fluid-filled sac) on the inside of your foot at the big toe joint   - Pain while wearing shoes -especially shoes too narrow or with high heels    - Pain during activities   - Corn in between the big toe and second toe   - Callous formation on the side or bottom of the big toe or big toe joint   - Callous  under the second toe joint (2nd MTPJ)   - Pain in the second toe joint   TREATMENT  Conservative (non-surgical) treatment will not make the bunion go away, but it will hopefully decrease the signs and symptoms you have and help you get rid of the pain and get you back to your activities.   1.  Wider shoes or extra depth shoes: Most bunion pain can be improved simply by wearing compatible shoes. People with bunions cannot choose footwear simply because they like the style. Your bunion should determine which shoes are to be worn. Wide shoes with nonirritating seams,soft leather and a square toe box are most compatible with a bunion. Shoes should fit appropriately right out of the box but may need to be professionally stretched and modified to accommodate the bump. Heels, dress shoes and shoes with pointed toes will not be comfortable.   2. NSAIDs   3.  Arch supports, custom inserts, padding, splints, toe spacers : Most bunion pain can be improved simply by wearing compatible shoes. People with bunions cannot choose footwear simply because they like the style. Your bunion should determine which shoes are to be worn. Wide shoes with nonirritating seams,soft leather and a square toe box are most compatible with a bunion. Shoes should fit appropriately right out of the box but may need to be professionally stretched and modified to accommodate the bump. Heels, dress shoes and shoes with pointed toes will not be comfortable.   4.  Change activities   5.  Physical therapy  SURGERY  Surgical treatment for bunions is sometimes needed. If you are limited by pain, cannot fit in shoes comfortably and are not able to do your daily activities then surgery may be a good option for you. There are many different surgical procedures to repair bunions. Your foot and ankle surgeon will review your foot exam findings, your x-rays, your age, your health, your lifestyle, your physical activity level and discuss with you which procedure he  or she would recommend. Surgical procedures for bunions range from soft tissue repair to cutting and realigning the bones. It is not recommended that you have bunion surgery for cosmetic reasons (you do not like how your foot looks) or because you want to fit in a certain pair of shoes; There is the risk that even after surgery, the bunion will reoccur 9-10% of the time.   Bunion surgery involves cutting and repositioning the bones surrounding the bunion. Pins and screws are used to hold the bones in place during the healing process. The goal of bunion surgery is to reduce the size of the bunion bump. Realignment of the toe and joint is attempted.     Some first toes cannot be forced back into normal alignment even with surgery. Surgery is helpful in most cases but does not necessarily create a normal foot.   Healing after surgery requires about six weeks of protection. This allows the bone to heal. Maximum recovery takes about one year. The scar tissue and jOint structures require this amount of time to finish the healing process. Expect stiffness, swelling and numbness during that time frame. Bunion surgery does involve side effects. Some side effects are predictable and others are less common but do occur. A scar will be visible and could be irritated by shoes. The shoe may rub on the screw or internal pin requiring surgical removal of these fixation devices. The screw and pin would likely be left in place for a full year. The first toe may loose motion after bunion surgery. The amount of stiffness is variable. Some people never regain normal motion of the first toe. This is due to scar tissue inherent to any surgery. The first toe may drift toward the second toe or away from the second toe. Spreading of the first and second toes is a rare occurrence after bunion surgery. This can be quite bothersome and would need to be surgically repaired. Toe drift toward the second toe could result in a recurrent bunion and  revision surgery. Joint fusion is one option to correct an unstable, drifting toe. This procedure straightens the toe, however, no motion remains. Fusion may be necessary to correct complications of bunion surgery or as the original procedure in severe cases.   All surgical procedures involve risk of infection, numbness, pain, delayed healing, osteotomy dislocation, blood clots, continued foot pain, etc. Bunion surgery is quite complex and should not be taken lightly.   Any skin incision can lead to infection. Deep infection might involve the bone and thus repeat surgery and six weeks of IV antibiotics. Scar tissue can cause nerve pain or numbness. This is generally temporary but can be permanent. We do not have treatments that cure nerve problems. Second toe pain could be related to altered mechanics and pressure transferred to the second toe. Most feet with bunions have pre-existing second toe problems. Delayed bone healing would lengthen the healing time. Some bones simply do not heal. This requires repeat surgery, electronic bone stimulation and/or extended protection. Smokers have an approximate 20% chance of poor bone healing. This is double that of a non-smoker. The bone cut may displace. This may need to be repaired with a second operation. Displacement can cause jOint malalignment. Immobility after surgery can cause blood clots in the legs and lungs. This could result in death.   Foot pain is complex. Most feet hurt for more than one reason. Fixing the bunion would not necessarily create a pain free foot. Appropriate shoes, healthy body weight, avoidance of bare foot walking and moderation of activity will always be necessary to enjoy foot comfort. Your bunion may involve arthritis, which is incurable even with surgery. Long standing bunions often involve chronic irritation to the surrounding nerves. Nerve pain may not resolve even with reducing the bunion bump since permanent nerve damage may be present    Bunion surgery is nevertheless quite successful. Most surgical patients are pleased with their foot following bunion surgery. Many of the issues described above can be controlled by taking proper care of your foot during the healing process.   Your surgeon would be happy to fully describe any of the above issues. You should pursue a full understanding of the operation,recovery process and any potential problems that could develop.   PREVENTION  1.  Do not wear high heels if there is a family history of bunions.  2.  Wear shoes that have enough width and depth in the toe box  Here are exercises that may benefit people with bunions:   Toe stretches - Stretching out your toes can help keep them limber and offset foot pain. To stretch your toes, point your toes straight ahead for 5 seconds and then curl them under for 5 seconds. Repeat these stretches 10 times. These exercises can be especially beneficial if you also have hammertoes, or chronically bent toes, in addition to a bunion.   Toe flexing and adrian - Press your toes against a hard surface such as a wall, to flex and stretch them; hold the position for 10 seconds and repeat three to four times. Then flex your toes in the opposite direction; hold the position for 10 seconds and repeat three to four times.   Stretching your big toe - Using your fingers to gently pull your big toe over into proper alignment can be helpful as well. Hold your toe in position for 10 seconds and repeat three to four times.   Resistance exercises - Wrap either a towel or belt around your big toe and use it to pull your big toe toward you while simultaneously pushing forward, against the towel, with your big toe.   Ball roll - To massage the bottom of your foot, sit down, place a golf ball on the floor under your foot, and roll it around under your foot for two minutes. This can help relieve foot strain and cramping.   Towel curls - You can strengthen your toes by spreading out a  small towel on the floor, curling your toes around it, and pulling it toward you. Repeat five times. Gripping objects with your toes like this can help keep your foot flexible.   Picking up marbles - Another gripping exercise you can perform to keep your foot flexible is picking up marbles with your toes. Do this by placing 20 marbles on the floor in front of you and use your foot to pick the marbles up one by one and place them in a bowl.   Walking along the beach - Whenever possible, spend time walking on sand. This can give you a gentle foot massage and also help strengthen your toes. This is especially beneficial for people who have arthritis associated with their bunions.      www.pedifix.com   1-800-PEDIFIX   OVER THE COUNTER INSERTS    Most of these can be found at your local [employer], [employer], or online:    SuperFeet   Sofsole Fit Spenco   Power Step   Walk-Fit ([employer])  *For heel pain* Arch Cradles  *For heel pain*       ** A good high quality over the counter insert should cost around $40-$50

## 2020-12-28 ENCOUNTER — TRANSFERRED RECORDS (OUTPATIENT)
Dept: HEALTH INFORMATION MANAGEMENT | Facility: CLINIC | Age: 75
End: 2020-12-28

## 2021-02-15 ENCOUNTER — TELEPHONE (OUTPATIENT)
Dept: FAMILY MEDICINE | Facility: CLINIC | Age: 76
End: 2021-02-15

## 2021-02-15 DIAGNOSIS — M25.531 RIGHT WRIST PAIN: Primary | ICD-10-CM

## 2021-02-15 NOTE — TELEPHONE ENCOUNTER
"E-Mail received from patient:  \"Messi, I've been experiencing pain and discomfort in my right wrist. I've used a compression cuff, applied an ice pack for 30 minutes 2x's each day and been on 8 tablets of ibuprofen, no reduction in pain.     I'd like to see a orthopedic hand specialist, and learn what the problem is\"    Message handled by Nurse Triage with [facility] - provider name: Dr. Les BURNHAM for referral.  Referral placed    Attempt #1  Called patient @ [phone] - Unable to LM      Shyla Blanchard RN  [facility]  "

## 2021-02-16 NOTE — TELEPHONE ENCOUNTER
Called # [phone]     Pt contacted and advised of referral placed, Patient stated an understanding and agreed with plan, will call back for info in haven't heard from scheduling in 2 days      Farhat Gibson RN   [facility]

## 2021-02-23 ENCOUNTER — OFFICE VISIT (OUTPATIENT)
Dept: ORTHOPEDICS | Facility: CLINIC | Age: 76
End: 2021-02-23
Attending: FAMILY MEDICINE
Payer: COMMERCIAL

## 2021-02-23 ENCOUNTER — ANCILLARY PROCEDURE (OUTPATIENT)
Dept: GENERAL RADIOLOGY | Facility: CLINIC | Age: 76
End: 2021-02-23
Attending: ORTHOPAEDIC SURGERY
Payer: COMMERCIAL

## 2021-02-23 VITALS — OXYGEN SATURATION: 95 % | HEART RATE: 74 BPM | DIASTOLIC BLOOD PRESSURE: 78 MMHG | SYSTOLIC BLOOD PRESSURE: 140 MMHG

## 2021-02-23 DIAGNOSIS — M25.531 RIGHT WRIST PAIN: ICD-10-CM

## 2021-02-23 DIAGNOSIS — M65.4 TENOSYNOVITIS, DE QUERVAIN: Primary | ICD-10-CM

## 2021-02-23 DIAGNOSIS — M25.532 LEFT WRIST PAIN: ICD-10-CM

## 2021-02-23 PROCEDURE — 99203 OFFICE O/P NEW LOW 30 MIN: CPT | Performed by: ORTHOPAEDIC SURGERY

## 2021-02-23 PROCEDURE — 73110 X-RAY EXAM OF WRIST: CPT | Mod: LT | Performed by: RADIOLOGY

## 2021-02-23 ASSESSMENT — PAIN SCALES - GENERAL: PAINLEVEL: MODERATE PAIN (4)

## 2021-02-23 NOTE — PATIENT INSTRUCTIONS
Rupert;    Dr Grace discussed this style of thumb brace: Meka Kennedy Barbara Thumb Splint. Thank you. Additionally; he suggested over the counter Voltaren gel topically.

## 2021-02-23 NOTE — NURSING NOTE
Rupert to follow up with Primary Care provider regarding elevated blood pressure.    Bernard SULLIVAN

## 2021-02-23 NOTE — LETTER
2/23/2021         RE: Alexander Alfred  [address] [address] [facility] [address]        Dear Colleague,    Thank you for referring your patient, Alexander Alfred, to the [facility]. Please see a copy of my visit note below.    SUBJECTIVE:   Alexander Alfred is a 75 year old male who is seen in consultation at the request of Dr. Saldana for evaluation of left wrist pain that has been present approximately 4 weeks . No known injury.  He thinks it was caused by using a walking stick hiking, with repetitive ulnar deviation     Present symptoms: pain lifting, pain with dorsiflexion and volar flexion    Pain radial side.   Hasn't noted swelling.  Thinks it's repetitive motion problem     Treatments tried to this point:   Ice, nsaids   Wrist sleeve.    Orthopedic PMH: no wrist problems in the past.  History of tendonitis elbow.     Review of Systems:  Constitutional:  NEGATIVE for fever, chills, change in weight  Integumentary/Skin:  NEGATIVE for worrisome rashes, moles or lesions  Eyes:  NEGATIVE for vision changes or irritation  ENT/Mouth:  NEGATIVE for ear, mouth and throat problems  Resp:  NEGATIVE for significant cough or SOB  Breast:  NEGATIVE for masses, tenderness or discharge  CV:  NEGATIVE for chest pain, palpitations or peripheral edema  GI:  NEGATIVE for nausea, abdominal pain, heartburn, or change in bowel habits  :  Negative   Musculoskeletal:  See HPI above  Neuro:  NEGATIVE for weakness, dizziness or paresthesias  Endocrine:  NEGATIVE for temperature intolerance, skin/hair changes  Heme/allergy/immune:  NEGATIVE for bleeding problems  Psychiatric:  NEGATIVE for changes in mood or affect    Past Medical History:   Past Medical History:   Diagnosis Date     Asthma, mild intermittent     Park nicollet asthma     EE (eosinophilic esophagitis) 08/2013    Dr Ramirez     Hyperlipidemia LDL goal <130      Hypertension goal BP (blood pressure) < 140/90      Lumbar stenosis     Dr Knight - Calf  weakness/atrophy     Nasal polyps 2004    approx 2/year - increased sx from nasal polyps - prednisone 40 mg x 2 days, 20 mg x 2 days, 10mg x 10 days with excellent results     Other chronic pain      Sensorineural hearing loss (SNHL) of both ears     Dr Hidalgo - R>L     Sleep apnea 2006    moderate - CPAP - 5-15 cm     Past Surgical History:   Past Surgical History:   Procedure Laterality Date     COLONOSCOPY  2010    normal - due 2020     COLONOSCOPY N/A 10/21/2020    normal, consider 10 yrs     DECOMPRESSION, FUSION LUMBAR POSTERIOR THREE + LEVELS, COMBINED N/A 10/07/2014    Dr Knight - COMBINED DECOMPRESSION, FUSION LUMBAR POSTERIOR THREE + LEVELS     ESOPHAGOSCOPY, GASTROSCOPY, DUODENOSCOPY (EGD), COMBINED  08/07/2013    Dr Ramirez - EE, non erosive gastropathy     ROTATOR CUFF REPAIR RT/LT Left 2005    Lt shoulder - dr armaan lazar     ROTATOR CUFF REPAIR RT/LT Left 05/2009    Lt shoulder - dr armaan lazar     stress echo  07/2011    normal     STRESS ECHO ([facility])  06/2015    normal stress echo     SURGICAL HISTORY OF -   1982    Lt ankle CRIF     SURGICAL HISTORY OF -   04/2008    blephoplasty     SURGICAL HISTORY OF -   11/2016    T12-L1 - Decompression - Dr Knight -      TONSILLECTOMY  1950     Family History:   Family History   Problem Relation Age of Onset     Hypertension Father      C.A.D. Father         smoker     C.A.D. Cousin         cousin - at 45     Hypertension Maternal Grandfather      C.A.D. Maternal Grandfather      Coronary Artery Disease Maternal Uncle      Coronary Artery Disease Maternal Uncle      Colon Cancer No family hx of      Social History:   Social History     Tobacco Use     Smoking status: Never Smoker     Smokeless tobacco: Never Used   Substance Use Topics     Alcohol use: Yes     Alcohol/week: 11.7 standard drinks     Types: 14 Standard drinks or equivalent per week     Comment: 2 drinks per day avg     OBJECTIVE:  Physical Exam:  There were no vitals taken for this  visit.  General Appearance: healthy, alert and no distress   Skin: no suspicious lesions or rashes  Neuro: Normal strength and tone, mentation intact and speech normal  Vascular: good pulses, and cappillary refill   Lymph: no lymphadenopathy   Psych:  mentation appears normal and affect normal/bright  Resp: no increased work of breathing     Left Wrist Exam:  Inspection: fullness of thumb MP.       ROM: some grinding of 1st CMC  Tender: over 1st dorsal compartment.  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  I don't think that is the cause of his symptoms.    PLAN:   Nonoperative treatment. Expect improvement in most cases 6-8 weeks.  * Rest. Immobilization in removable thumb spica splints. We showed him a SB brace, he can order.     * Activity modification - avoid activities that aggravate symptoms.  * NSAIDS - regular use for inflammation, with food, as long as no contra-indications. Please discuss with primary care provider and pediatrician if breastfeeding  * Ice twice daily to three times daily.  * Tylenol as needed for pain  * Injections: consider should pain persist despite trial of mentioned recommendations.    *  Hand Therapy for thumb based hand splints (if off the shelf splints don't fit well) ,as well as treatment modalities as indicated.  * Return to clinic in 2-3 months, sooner as needed.    Surgery is rarely required, but can be done if the above treatments are not effective.      Return to clinic: as needed.  If persistent problems, he may see someone closer to him in [place] for an injection. But he is welcome to come back here if he would like.     RENEE Grace MD  Dept. Orthopedic Surgery  [facility]           Again, thank you for allowing me  to participate in the care of your patient.        Sincerely,        Cecilio Grace MD

## 2021-02-23 NOTE — PROGRESS NOTES
SUBJECTIVE:   Alexander Alfred is a 75 year old male who is seen in consultation at the request of Dr. Saldana for evaluation of left wrist pain that has been present approximately 4 weeks . No known injury.  He thinks it was caused by using a walking stick hiking, with repetitive ulnar deviation     Present symptoms: pain lifting, pain with dorsiflexion and volar flexion    Pain radial side.   Hasn't noted swelling.  Thinks it's repetitive motion problem     Treatments tried to this point:   Ice, nsaids   Wrist sleeve.    Orthopedic PMH: no wrist problems in the past.  History of tendonitis elbow.     Review of Systems:  Constitutional:  NEGATIVE for fever, chills, change in weight  Integumentary/Skin:  NEGATIVE for worrisome rashes, moles or lesions  Eyes:  NEGATIVE for vision changes or irritation  ENT/Mouth:  NEGATIVE for ear, mouth and throat problems  Resp:  NEGATIVE for significant cough or SOB  Breast:  NEGATIVE for masses, tenderness or discharge  CV:  NEGATIVE for chest pain, palpitations or peripheral edema  GI:  NEGATIVE for nausea, abdominal pain, heartburn, or change in bowel habits  :  Negative   Musculoskeletal:  See HPI above  Neuro:  NEGATIVE for weakness, dizziness or paresthesias  Endocrine:  NEGATIVE for temperature intolerance, skin/hair changes  Heme/allergy/immune:  NEGATIVE for bleeding problems  Psychiatric:  NEGATIVE for changes in mood or affect    Past Medical History:   Past Medical History:   Diagnosis Date     Asthma, mild intermittent     Park nicollet asthma     EE (eosinophilic esophagitis) 08/2013    Dr Ramirez     Hyperlipidemia LDL goal <130      Hypertension goal BP (blood pressure) < 140/90      Lumbar stenosis     Dr Knight - Calf weakness/atrophy     Nasal polyps 2004    approx 2/year - increased sx from nasal polyps - prednisone 40 mg x 2 days, 20 mg x 2 days, 10mg x 10 days with excellent results     Other chronic pain      Sensorineural hearing loss (SNHL) of both ears      Dr Hidalgo - R>L     Sleep apnea 2006    moderate - CPAP - 5-15 cm     Past Surgical History:   Past Surgical History:   Procedure Laterality Date     COLONOSCOPY  2010    normal - due 2020     COLONOSCOPY N/A 10/21/2020    normal, consider 10 yrs     DECOMPRESSION, FUSION LUMBAR POSTERIOR THREE + LEVELS, COMBINED N/A 10/07/2014    Dr Knight - COMBINED DECOMPRESSION, FUSION LUMBAR POSTERIOR THREE + LEVELS     ESOPHAGOSCOPY, GASTROSCOPY, DUODENOSCOPY (EGD), COMBINED  08/07/2013    Dr Ramirez - EE, non erosive gastropathy     ROTATOR CUFF REPAIR RT/LT Left 2005    Lt shoulder - dr armaan lazar     ROTATOR CUFF REPAIR RT/LT Left 05/2009    Lt shoulder - dr armaan lazar     stress echo  07/2011    normal     STRESS ECHO ([facility])  06/2015    normal stress echo     SURGICAL HISTORY OF -   1982    Lt ankle CRIF     SURGICAL HISTORY OF -   04/2008    blephoplasty     SURGICAL HISTORY OF -   11/2016    T12-L1 - Decompression - Dr Knight -      TONSILLECTOMY  1950     Family History:   Family History   Problem Relation Age of Onset     Hypertension Father      C.A.D. Father         smoker     C.A.D. Cousin         cousin - at 45     Hypertension Maternal Grandfather      C.A.D. Maternal Grandfather      Coronary Artery Disease Maternal Uncle      Coronary Artery Disease Maternal Uncle      Colon Cancer No family hx of      Social History:   Social History     Tobacco Use     Smoking status: Never Smoker     Smokeless tobacco: Never Used   Substance Use Topics     Alcohol use: Yes     Alcohol/week: 11.7 standard drinks     Types: 14 Standard drinks or equivalent per week     Comment: 2 drinks per day avg     OBJECTIVE:  Physical Exam:  There were no vitals taken for this visit.  General Appearance: healthy, alert and no distress   Skin: no suspicious lesions or rashes  Neuro: Normal strength and tone, mentation intact and speech normal  Vascular: good pulses, and cappillary refill   Lymph: no lymphadenopathy   Psych:   mentation appears normal and affect normal/bright  Resp: no increased work of breathing     Left Wrist Exam:  Inspection: fullness of thumb MP.       ROM: some grinding of 1st CMC  Tender: over 1st dorsal compartment.  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  I don't think that is the cause of his symptoms.    PLAN:   Nonoperative treatment. Expect improvement in most cases 6-8 weeks.  * Rest. Immobilization in removable thumb spica splints. We showed him a SB brace, he can order.     * Activity modification - avoid activities that aggravate symptoms.  * NSAIDS - regular use for inflammation, with food, as long as no contra-indications. Please discuss with primary care provider and pediatrician if breastfeeding  * Ice twice daily to three times daily.  * Tylenol as needed for pain  * Injections: consider should pain persist despite trial of mentioned recommendations.    *  Hand Therapy for thumb based hand splints (if off the shelf splints don't fit well) ,as well as treatment modalities as indicated.  * Return to clinic in 2-3 months, sooner as needed.    Surgery is rarely required, but can be done if the above treatments are not effective.      Return to clinic: as needed.  If persistent problems, he may see someone closer to him in [place] for an injection. But he is welcome to come back here if he would like.     RENEE Grace MD  Dept. Orthopedic Surgery  [facility]

## 2021-03-19 ENCOUNTER — TELEPHONE (OUTPATIENT)
Dept: FAMILY MEDICINE | Facility: CLINIC | Age: 76
End: 2021-03-19

## 2021-03-22 NOTE — TELEPHONE ENCOUNTER
"TripIthart messge not read.     Called patient  Telephone Information:   Mobile [phone]     Patient had been having dizziness and using dramamine \"with limited success\". States when he bent over he would have \"momentary\" dizziness. Since Friday, he has had no dizziness. Denies chest pain, shortness of breath, headache. Patient will monitor symptoms and call back if they return.     Jennifer Hernandez RN  [facility]    "

## 2021-04-30 ENCOUNTER — MYC MEDICAL ADVICE (OUTPATIENT)
Dept: FAMILY MEDICINE | Facility: CLINIC | Age: 76
End: 2021-04-30

## 2021-04-30 DIAGNOSIS — R13.10 DYSPHAGIA: Primary | ICD-10-CM

## 2021-04-30 DIAGNOSIS — K20.0 EE (EOSINOPHILIC ESOPHAGITIS): ICD-10-CM

## 2021-05-03 NOTE — TELEPHONE ENCOUNTER
Order placed    Attempt #1  Called patient @ [phone] - Left a non-detailed message to call back and speak with any triage nurse.      Shyla Blanchard RN  [facility]

## 2021-05-03 NOTE — TELEPHONE ENCOUNTER
Received call back from patient, informed of provider order placed for EGD. Gave patient referral information to call and schedule if not contacted within 2-3 business days. Patient verbalized understanding and had no further questions or concerns at this time.     Eduard DILL RN

## 2021-05-03 NOTE — TELEPHONE ENCOUNTER
LOV: 9/9/2020    Please see my chart message below     Please review and advise     Thank you     Dianna Geronimo RN, BSN  [facility]

## 2021-05-16 DIAGNOSIS — Z11.59 ENCOUNTER FOR SCREENING FOR OTHER VIRAL DISEASES: ICD-10-CM

## 2021-05-29 DIAGNOSIS — Z11.59 ENCOUNTER FOR SCREENING FOR OTHER VIRAL DISEASES: ICD-10-CM

## 2021-05-29 LAB
LABORATORY COMMENT REPORT: NORMAL
SARS-COV-2 RNA RESP QL NAA+PROBE: NEGATIVE
SARS-COV-2 RNA RESP QL NAA+PROBE: NORMAL
SPECIMEN SOURCE: NORMAL
SPECIMEN SOURCE: NORMAL

## 2021-05-29 PROCEDURE — U0005 INFEC AGEN DETEC AMPLI PROBE: HCPCS | Performed by: INTERNAL MEDICINE

## 2021-05-29 PROCEDURE — U0003 INFECTIOUS AGENT DETECTION BY NUCLEIC ACID (DNA OR RNA); SEVERE ACUTE RESPIRATORY SYNDROME CORONAVIRUS 2 (SARS-COV-2) (CORONAVIRUS DISEASE [COVID-19]), AMPLIFIED PROBE TECHNIQUE, MAKING USE OF HIGH THROUGHPUT TECHNOLOGIES AS DESCRIBED BY CMS-2020-01-R: HCPCS | Performed by: INTERNAL MEDICINE

## 2021-06-02 ENCOUNTER — HOSPITAL ENCOUNTER (OUTPATIENT)
Facility: CLINIC | Age: 76
Discharge: HOME | End: 2021-06-02
Attending: INTERNAL MEDICINE | Admitting: INTERNAL MEDICINE
Payer: COMMERCIAL

## 2021-06-02 VITALS
RESPIRATION RATE: 5 BRPM | WEIGHT: 175 LBS | OXYGEN SATURATION: 96 % | HEART RATE: 72 BPM | DIASTOLIC BLOOD PRESSURE: 85 MMHG | HEIGHT: 68 IN | TEMPERATURE: 97.5 F | SYSTOLIC BLOOD PRESSURE: 187 MMHG | BODY MASS INDEX: 26.52 KG/M2

## 2021-06-02 LAB — UPPER GI ENDOSCOPY: NORMAL

## 2021-06-02 PROCEDURE — 999N000099 HC STATISTIC MODERATE SEDATION < 10 MIN: Performed by: INTERNAL MEDICINE

## 2021-06-02 PROCEDURE — 88312 SPECIAL STAINS GROUP 1: CPT | Mod: 26 | Performed by: PATHOLOGY

## 2021-06-02 PROCEDURE — 88305 TISSUE EXAM BY PATHOLOGIST: CPT | Mod: TC | Performed by: INTERNAL MEDICINE

## 2021-06-02 PROCEDURE — 250N000011 HC RX IP 250 OP 636: Performed by: INTERNAL MEDICINE

## 2021-06-02 PROCEDURE — 88312 SPECIAL STAINS GROUP 1: CPT | Mod: TC | Performed by: INTERNAL MEDICINE

## 2021-06-02 PROCEDURE — 43239 EGD BIOPSY SINGLE/MULTIPLE: CPT | Performed by: INTERNAL MEDICINE

## 2021-06-02 PROCEDURE — 250N000009 HC RX 250: Performed by: INTERNAL MEDICINE

## 2021-06-02 PROCEDURE — 88305 TISSUE EXAM BY PATHOLOGIST: CPT | Mod: 26 | Performed by: PATHOLOGY

## 2021-06-02 RX ORDER — PROCHLORPERAZINE MALEATE 5 MG
5 TABLET ORAL EVERY 6 HOURS PRN
Status: DISCONTINUED | OUTPATIENT
Start: 2021-06-02 | End: 2021-06-02 | Stop reason: HOSPADM

## 2021-06-02 RX ORDER — ONDANSETRON 2 MG/ML
4 INJECTION INTRAMUSCULAR; INTRAVENOUS EVERY 6 HOURS PRN
Status: DISCONTINUED | OUTPATIENT
Start: 2021-06-02 | End: 2021-06-02 | Stop reason: HOSPADM

## 2021-06-02 RX ORDER — ONDANSETRON 4 MG/1
4 TABLET, ORALLY DISINTEGRATING ORAL EVERY 6 HOURS PRN
Status: DISCONTINUED | OUTPATIENT
Start: 2021-06-02 | End: 2021-06-02 | Stop reason: HOSPADM

## 2021-06-02 RX ORDER — FLUMAZENIL 0.1 MG/ML
0.2 INJECTION, SOLUTION INTRAVENOUS
Status: DISCONTINUED | OUTPATIENT
Start: 2021-06-02 | End: 2021-06-02 | Stop reason: HOSPADM

## 2021-06-02 RX ORDER — LIDOCAINE 40 MG/G
CREAM TOPICAL
Status: DISCONTINUED | OUTPATIENT
Start: 2021-06-02 | End: 2021-06-02 | Stop reason: HOSPADM

## 2021-06-02 RX ORDER — FENTANYL CITRATE 50 UG/ML
INJECTION, SOLUTION INTRAMUSCULAR; INTRAVENOUS PRN
Status: COMPLETED | OUTPATIENT
Start: 2021-06-02 | End: 2021-06-02

## 2021-06-02 RX ORDER — NALOXONE HYDROCHLORIDE 0.4 MG/ML
0.2 INJECTION, SOLUTION INTRAMUSCULAR; INTRAVENOUS; SUBCUTANEOUS
Status: DISCONTINUED | OUTPATIENT
Start: 2021-06-02 | End: 2021-06-02 | Stop reason: HOSPADM

## 2021-06-02 RX ORDER — NALOXONE HYDROCHLORIDE 0.4 MG/ML
0.4 INJECTION, SOLUTION INTRAMUSCULAR; INTRAVENOUS; SUBCUTANEOUS
Status: DISCONTINUED | OUTPATIENT
Start: 2021-06-02 | End: 2021-06-02 | Stop reason: HOSPADM

## 2021-06-02 RX ORDER — ONDANSETRON 2 MG/ML
4 INJECTION INTRAMUSCULAR; INTRAVENOUS
Status: DISCONTINUED | OUTPATIENT
Start: 2021-06-02 | End: 2021-06-02 | Stop reason: HOSPADM

## 2021-06-02 RX ADMIN — FENTANYL CITRATE 50 MCG: 50 INJECTION, SOLUTION INTRAMUSCULAR; INTRAVENOUS at 10:27

## 2021-06-02 RX ADMIN — MIDAZOLAM 1 MG: 1 INJECTION INTRAMUSCULAR; INTRAVENOUS at 10:27

## 2021-06-02 RX ADMIN — TOPICAL ANESTHETIC 1 EACH: 200 SPRAY DENTAL; PERIODONTAL at 10:28

## 2021-06-02 ASSESSMENT — MIFFLIN-ST. JEOR: SCORE: 1508.29

## 2021-06-02 NOTE — H&P
Pre-Endoscopy History and Physical     Alexander Alfred MRN# [id number]   YOB: 1945 Age: 75 year old     Date of Procedure: 6/2/2021  Primary care provider: Messi Saldana  Type of Endoscopy: Gastroscopy with possible biopsy, possible dilation  Reason for Procedure: dysphagia  Type of Anesthesia Anticipated: Conscious Sedation    HPI:    Alexander is a 75 year old male who will be undergoing the above procedure.      A history and physical has been performed. The patient's medications and allergies have been reviewed. The risks and benefits of the procedure and the sedation options and risks were discussed with the patient.  All questions were answered and informed consent was obtained.      He denies a personal or family history of anesthesia complications or bleeding disorders.     Patient Active Problem List   Diagnosis     Nasal polyps     Asthma, mild intermittent     Sleep apnea     Hyperlipidemia LDL goal <130     Hypertension goal BP (blood pressure) < 140/90     EE (eosinophilic esophagitis)     Osteoarthritis of left hip     Lumbar facet arthropathy     Lumbar degenerative disc disease     Lumbar pain     Left lumbosacral radiculopathy     Spondylolisthesis of lumbar region     Lumbar foraminal stenosis     Central spinal stenosis     Spinal stenosis of lumbar region at multiple levels     Acquired spondylolisthesis     Closed traumatic nondisplaced fracture of distal end of left fibula     Left ankle pain     Ankle fracture     Advanced directives, counseling/discussion     Lumbar stenosis     Abnormal joint on examination     Allergic rhinitis     Carpal tunnel syndrome     HNP (herniated nucleus pulposus), thoracic     Memory loss     Ostium secundum type atrial septal defect        Past Medical History:   Diagnosis Date     Asthma, mild intermittent     [place] asthma     EE (eosinophilic esophagitis) 08/2013    Dr Ramirez     Hyperlipidemia LDL goal <130      Hypertension goal BP (blood  pressure) < 140/90      Lumbar stenosis     Dr Knight - Calf weakness/atrophy     Nasal polyps 2004    approx 2/year - increased sx from nasal polyps - prednisone 40 mg x 2 days, 20 mg x 2 days, 10mg x 10 days with excellent results     Other chronic pain      Sensorineural hearing loss (SNHL) of both ears     Dr Hidalgo - R>L     Sleep apnea 2006    moderate - CPAP - 5-15 cm        Past Surgical History:   Procedure Laterality Date     COLONOSCOPY  2010    normal - due 2020     COLONOSCOPY N/A 10/21/2020    normal, consider 10 yrs     DECOMPRESSION, FUSION LUMBAR POSTERIOR THREE + LEVELS, COMBINED N/A 10/07/2014    Dr Knight - COMBINED DECOMPRESSION, FUSION LUMBAR POSTERIOR THREE + LEVELS     ESOPHAGOSCOPY, GASTROSCOPY, DUODENOSCOPY (EGD), COMBINED  08/07/2013    Dr Ramirez - EE, non erosive gastropathy     ROTATOR CUFF REPAIR RT/LT Left 2005    Lt shoulder - dr armaan lazar     ROTATOR CUFF REPAIR RT/LT Left 05/2009    Lt shoulder - dr armaan lazar     stress echo  07/2011    normal     STRESS ECHO ([facility])  06/2015    normal stress echo     SURGICAL HISTORY OF -   1982    Lt ankle CRIF     SURGICAL HISTORY OF -   04/2008    blephoplasty     SURGICAL HISTORY OF -   11/2016    T12-L1 - Decompression - Dr Knight -      TONSILLECTOMY  1950       Social History     Tobacco Use     Smoking status: Never Smoker     Smokeless tobacco: Never Used   Substance Use Topics     Alcohol use: Yes     Alcohol/week: 11.7 standard drinks     Types: 14 Standard drinks or equivalent per week     Comment: 2 drinks per day avg       Family History   Problem Relation Age of Onset     Hypertension Father      C.A.D. Father         smoker     C.A.D. Cousin         cousin - at 45     Hypertension Maternal Grandfather      C.A.D. Maternal Grandfather      Coronary Artery Disease Maternal Uncle      Coronary Artery Disease Maternal Uncle      Colon Cancer No family hx of        Prior to Admission medications    Medication Sig Start Date End  "Date Taking? Authorizing Provider   aspirin 81 MG chewable tablet Take 1 tablet (81 mg) by mouth daily 7/1/16  Yes Messi Saldana MD   losartan-hydrochlorothiazide (HYZAAR) 50-12.5 MG tablet Take 1 tablet by mouth daily 9/9/20  Yes Messi Saldana MD   albuterol (PROAIR HFA/PROVENTIL HFA/VENTOLIN HFA) 108 (90 Base) MCG/ACT inhaler Inhale 2 puffs into the lungs every 6 hours as needed for shortness of breath / dyspnea or wheezing 9/9/20   Messi Saldana MD   Coenzyme Q10 (COQ-10) 100 MG CAPS Take 100 mg by mouth daily    Unknown, Entered By History   fluticasone (FLONASE) 50 MCG/ACT nasal spray Spray 2 sprays into both nostrils daily 9/9/20   Messi Saldana MD   KRILL OIL PO Take 1 capsule by mouth daily Taking MegaRed OTC. Strength unknown.    Unknown, Entered By History   Multiple Vitamin (MULTIVITAMIN OR) Take 1 tablet by mouth daily     Reported, Patient   Probiotic Product (4X PROBIOTIC) TABS  8/3/18   Messi Saldana MD       Allergies   Allergen Reactions     Codeine GI Disturbance     Hydrocodone GI Disturbance     Oxycodone GI Disturbance        REVIEW OF SYSTEMS:   5 point ROS negative except as noted above in HPI, including Gen., Resp., CV, GI &  system review.    PHYSICAL EXAM:   BP (!) 193/84   Pulse 54   Temp 97.5  F (36.4  C) (Temporal)   Resp 11   Ht 1.727 m (5' 8\")   Wt 79.4 kg (175 lb)   SpO2 97%   BMI 26.61 kg/m   Estimated body mass index is 26.61 kg/m  as calculated from the following:    Height as of this encounter: 1.727 m (5' 8\").    Weight as of this encounter: 79.4 kg (175 lb).   GENERAL APPEARANCE: alert, and oriented  MENTAL STATUS: alert  AIRWAY EXAM: Mallampatti Class II (visualization of the soft palate, fauces, and uvula)  RESP: lungs clear to auscultation - no rales, rhonchi or wheezes  CV: regular rates and rhythm  DIAGNOSTICS:    Not indicated    IMPRESSION   ASA Class 2 - Mild systemic disease    PLAN:   Plan for Gastroscopy with possible biopsy, possible dilation. We discussed the " risks, benefits and alternatives and the patient wished to proceed.    The above has been forwarded to the consulting provider.      Signed Electronically by: Teo Ramirez MD  June 2, 2021

## 2021-06-02 NOTE — LETTER
May 7, 2021      Alexander PEREZ Tima  [address]  [place] [address]         Thank you for choosing [facility]. You are scheduled for the following service(s).   Please be aware that coverage of these services is subject to the terms and limitations of your health insurance plan.  Call member services at your health plan with any benefit or coverage questions.    Date:   Wednesday June 2, 2021      Procedure: UPPER ENDOSCOPY-EGD  Doctor: Dr Ramirez           Arrival Time:  1000    *Enter and check in at the Main Hospital Entrance  Procedure Time: 1030       Location:   [facility]        Endoscopy Department, First Floor *         [address] [place], [place] [address]      [phone] or [phone] (Rodrick) to reschedule          PRE-PROCEDURE CHECKLIST    If you have diabetes, ask your regular doctor for diet and medication restrictions.  If you take any antiplatelet or anticoagulant medications (such as Coumadin, Lovenox, Plavix, etc.) and have not already discussed this, please call your primary physician for advice on holding this medication.  If you take Aspirin, you may continue to do so.  If you are or may be pregnant, please discuss the risks and benefits of this procedure with your doctor.  You must arrange for a ride for the day of your exam. If you fail to arrange transportation with a responsible adult, your procedure will need to be cancelled and rescheduled. Taxi, bus and medical transport are not acceptable unless you have a responsible adult that you know & trust with you. Please arrange for this  to be able to pick you up in our department, approximately one hour after your scheduled procedure, if they are not able to stay with you.      Canceling or rescheduling   If you must cancel or reschedule your appointment, please call [phone] as soon as possible.      Upper Endoscopy or Esophagogastroduodenoscopy (EGD) is a test  performed to evaluate symptoms of persistent abdominal pain, nausea, vomiting and difficulty swallowing. It may also be used to treat various conditions of the upper gastrointestinal tract, such as bleeding, narrowing or abnormal growths.     What happens during an upper endoscopy?  On the day of your procedure, plan to spend up to one and a half hour after your arrival at the endoscopy center. The exam itself takes about 5 to 10 minutes.    Before the exam:  - You will change into a gown.   - Your medical history and medication list will be reviewed with you, unless it has already been done over the phone.   - A nurse will insert an intravenous (IV) line into your hand or arm.  - The doctor will talk to you and give you a consent form to sign.    During the exam:  - Medicine will be given through the IV line to help you relax and feel comfortable.   - Your heart rate and oxygen levels will be monitored. If your blood pressure is low, you may be given fluids through the IV line.   - The doctor will insert a flexible, hollow tube, called an endoscope, into your mouth and will advance it slowly through the esophagus, stomach and duodenum (the first part of your small intestine).   - You may have a feeling of pressure or fullness.   - If you have difficulty swallowing, and the doctor finds a narrowing in your esophagus, it may be possible for the area to be expanded-dilated during the exam.   - If abnormal tissue is found, the doctor may remove it through the endoscope (biopsy it) for closer examination. The tissue removal is painless.    After the exam:  - Any tissue samples removed during the exam will be sent to a lab for evaluation. It may take 5 to 7 working days for you to be notified of the results  - The doctor will prepare a full report for the physician who referred you for the upper endoscopy.   - The doctor will talk with you about the initial results of your exam.   - You may feel bloated after the  procedure. That is normal and should not last long.   - Your throat may feel sore for a short time.   - Following the exam, you may resume your normal diet. Avoid alcohol until the next day.   - You may resume your regular activities the day after the procedure.   - Medication given during the exam will prohibit you from driving for the rest of the day.  - A nurse will provide you with complete discharge instructions before you leave the endoscopy center. Be sure to ask the nurse for specific instructions if you take blood thinners such as Aspirin , Coumadin , Lovenox , Plavix , etc.       PREPARATION    To ensure a successful exam, please follow all instructions carefully.      The night before your exam:    STOP eating solid foods at 11:45 pm.     Clear liquids are okay to drink (examples: Gatorade , apple juice, clear broth,coffee or tea without milk or cream, etc.).     DO NOT drink red liquids or alcoholic beverages.    The day of your exam:    STOP drinking clear liquids 4 hours before your exam.     You may take your usual medications with 4 oz. of water, but it needs to be at least 4 hours prior to your procedure.    When you leave for the procedure:    Bring a list of all of your current medications, including any allergy or over-the-counter medications, unless you have already reviewed that with an Endoscopy RN over the phone.     Bring a photo ID as well as up-to-date insurance information, such as your insurance card and any referral forms that might be required by your payer.       DIRECTIONS TO THE ENDOSCOPY DEPARTMENT    From the north ([facility])  Take [address], exit on [address] . Get into the left hand paco, turn left (east), go one-half mile to [address] and turn left. Go north to the second stoplight, take a right on [address] and follow it to the Main Hospital entrance.  From the south ([facility])  Take [address]N to the [address]E split and exit on  [address] . On [address] , turn left (west) to [address]. Turn right (north) on [address]. Go north to the second stoplight, take a right on [address] and follow it to the Main Hospital entrance.  From the east via 35E ([facility])  Take [address] south to [place] [address] exit. Turn right on [address] . Go west to [address]. Turn right (north) on [address]. Go to the second stoplight, take a right on [address] to the Main Hospital entrance.  From the east via [address] ([facility])  Take [address] to [address]. Turn left (south) on [address] to [address], turn left (east) on [address] and follow it to the Main Hospital entrance.    From the west via [address] ([place], [place])  Take [address] east to [address]. Turn right (south) on [address] to [address], turn left (east) on [address] and follow it to the Main Hospital entrance.

## 2021-06-04 LAB — COPATH REPORT: NORMAL

## 2021-08-16 ENCOUNTER — OFFICE VISIT (OUTPATIENT)
Dept: FAMILY MEDICINE | Facility: CLINIC | Age: 76
End: 2021-08-16
Payer: COMMERCIAL

## 2021-08-16 VITALS
HEART RATE: 61 BPM | RESPIRATION RATE: 21 BRPM | DIASTOLIC BLOOD PRESSURE: 62 MMHG | BODY MASS INDEX: 29.96 KG/M2 | HEIGHT: 68 IN | OXYGEN SATURATION: 97 % | TEMPERATURE: 97.8 F | SYSTOLIC BLOOD PRESSURE: 122 MMHG | WEIGHT: 197.7 LBS

## 2021-08-16 DIAGNOSIS — K20.0 EE (EOSINOPHILIC ESOPHAGITIS): ICD-10-CM

## 2021-08-16 DIAGNOSIS — M43.16 SPONDYLOLISTHESIS OF LUMBAR REGION: ICD-10-CM

## 2021-08-16 DIAGNOSIS — Z12.11 SPECIAL SCREENING FOR MALIGNANT NEOPLASMS, COLON: ICD-10-CM

## 2021-08-16 DIAGNOSIS — Z12.5 SCREENING FOR PROSTATE CANCER: ICD-10-CM

## 2021-08-16 DIAGNOSIS — Q21.11 OSTIUM SECUNDUM TYPE ATRIAL SEPTAL DEFECT: ICD-10-CM

## 2021-08-16 DIAGNOSIS — Z51.81 MEDICATION MONITORING ENCOUNTER: ICD-10-CM

## 2021-08-16 DIAGNOSIS — M54.17 LEFT LUMBOSACRAL RADICULOPATHY: ICD-10-CM

## 2021-08-16 DIAGNOSIS — I10 HYPERTENSION GOAL BP (BLOOD PRESSURE) < 140/90: ICD-10-CM

## 2021-08-16 DIAGNOSIS — Z00.00 ROUTINE GENERAL MEDICAL EXAMINATION AT A HEALTH CARE FACILITY: Primary | ICD-10-CM

## 2021-08-16 DIAGNOSIS — M48.061 SPINAL STENOSIS OF LUMBAR REGION AT MULTIPLE LEVELS: ICD-10-CM

## 2021-08-16 DIAGNOSIS — J45.20 MILD INTERMITTENT ASTHMA WITHOUT COMPLICATION: ICD-10-CM

## 2021-08-16 DIAGNOSIS — M51.369 LUMBAR DEGENERATIVE DISC DISEASE: ICD-10-CM

## 2021-08-16 DIAGNOSIS — R73.03 PREDIABETES: ICD-10-CM

## 2021-08-16 DIAGNOSIS — E78.5 HYPERLIPIDEMIA LDL GOAL <130: ICD-10-CM
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
## 2021-08-16 DIAGNOSIS — J33.9 NASAL POLYP: ICD-10-CM

## 2021-08-16 DIAGNOSIS — M48.00 CENTRAL SPINAL STENOSIS: ICD-10-CM

## 2021-08-16 DIAGNOSIS — K22.70 BARRETT'S ESOPHAGUS WITHOUT DYSPLASIA: ICD-10-CM

## 2021-08-16 DIAGNOSIS — G47.33 OBSTRUCTIVE SLEEP APNEA SYNDROME: ICD-10-CM

## 2021-08-16 DIAGNOSIS — M48.061 LUMBAR FORAMINAL STENOSIS: ICD-10-CM

## 2021-08-16 DIAGNOSIS — M47.816 LUMBAR FACET ARTHROPATHY: ICD-10-CM

## 2021-08-16 DIAGNOSIS — Z12.11 SCREEN FOR COLON CANCER: ICD-10-CM

## 2021-08-16 LAB
ALBUMIN SERPL-MCNC: 3.6 G/DL (ref 3.4–5)
ALBUMIN UR-MCNC: NEGATIVE MG/DL
ALP SERPL-CCNC: 70 U/L (ref 40–150)
ALT SERPL W P-5'-P-CCNC: 40 U/L (ref 0–70)
ANION GAP SERPL CALCULATED.3IONS-SCNC: 1 MMOL/L (ref 3–14)
APPEARANCE UR: CLEAR
AST SERPL W P-5'-P-CCNC: 26 U/L (ref 0–45)
B BURGDOR IGG+IGM SER QL: 0.53
BILIRUB SERPL-MCNC: 0.6 MG/DL (ref 0.2–1.3)
BILIRUB UR QL STRIP: NEGATIVE
BUN SERPL-MCNC: 18 MG/DL (ref 7–30)
CALCIUM SERPL-MCNC: 8.9 MG/DL (ref 8.5–10.1)
CHLORIDE BLD-SCNC: 109 MMOL/L (ref 94–109)
CHOLEST SERPL-MCNC: 159 MG/DL
CK SERPL-CCNC: 473 U/L (ref 30–300)
CO2 SERPL-SCNC: 31 MMOL/L (ref 20–32)
COLOR UR AUTO: YELLOW
CREAT SERPL-MCNC: 1.12 MG/DL (ref 0.66–1.25)
CREAT UR-MCNC: 198 MG/DL
ERYTHROCYTE [DISTWIDTH] IN BLOOD BY AUTOMATED COUNT: 12.8 % (ref 10–15)
FASTING STATUS PATIENT QL REPORTED: YES
GFR SERPL CREATININE-BSD FRML MDRD: 64 ML/MIN/1.73M2
GLUCOSE BLD-MCNC: 97 MG/DL (ref 70–99)
GLUCOSE UR STRIP-MCNC: NEGATIVE MG/DL
HBA1C MFR BLD: 5.2 % (ref 0–5.6)
HCT VFR BLD AUTO: 39.7 % (ref 40–53)
HDLC SERPL-MCNC: 55 MG/DL
HGB BLD-MCNC: 13.5 G/DL (ref 13.3–17.7)
HGB UR QL STRIP: NEGATIVE
KETONES UR STRIP-MCNC: NEGATIVE MG/DL
LDLC SERPL CALC-MCNC: 82 MG/DL
LEUKOCYTE ESTERASE UR QL STRIP: NEGATIVE
MCH RBC QN AUTO: 32.5 PG (ref 26.5–33)
MCHC RBC AUTO-ENTMCNC: 34 G/DL (ref 31.5–36.5)
MCV RBC AUTO: 96 FL (ref 78–100)
MICROALBUMIN UR-MCNC: 13 MG/L
MICROALBUMIN/CREAT UR: 6.57 MG/G CR (ref 0–17)
NITRATE UR QL: NEGATIVE
NONHDLC SERPL-MCNC: 104 MG/DL
PH UR STRIP: 6.5 [PH] (ref 5–7)
PLATELET # BLD AUTO: 259 10E3/UL (ref 150–450)
POTASSIUM BLD-SCNC: 3.9 MMOL/L (ref 3.4–5.3)
PROT SERPL-MCNC: 6.8 G/DL (ref 6.8–8.8)
PSA SERPL-MCNC: 0.79 UG/L (ref 0–4)
RBC # BLD AUTO: 4.15 10E6/UL (ref 4.4–5.9)
SODIUM SERPL-SCNC: 141 MMOL/L (ref 133–144)
SP GR UR STRIP: 1.02 (ref 1–1.03)
TRIGL SERPL-MCNC: 111 MG/DL
TSH SERPL DL<=0.005 MIU/L-ACNC: 1.79 MU/L (ref 0.4–4)
UROBILINOGEN UR STRIP-ACNC: 0.2 E.U./DL
WBC # BLD AUTO: 6.1 10E3/UL (ref 4–11)

## 2021-08-16 PROCEDURE — 83036 HEMOGLOBIN GLYCOSYLATED A1C: CPT | Performed by: FAMILY MEDICINE

## 2021-08-16 PROCEDURE — 82043 UR ALBUMIN QUANTITATIVE: CPT | Performed by: FAMILY MEDICINE

## 2021-08-16 PROCEDURE — 99397 PER PM REEVAL EST PAT 65+ YR: CPT | Performed by: FAMILY MEDICINE

## 2021-08-16 PROCEDURE — 82550 ASSAY OF CK (CPK): CPT | Performed by: FAMILY MEDICINE

## 2021-08-16 PROCEDURE — 81003 URINALYSIS AUTO W/O SCOPE: CPT | Performed by: FAMILY MEDICINE

## 2021-08-16 PROCEDURE — 84443 ASSAY THYROID STIM HORMONE: CPT | Performed by: FAMILY MEDICINE

## 2021-08-16 PROCEDURE — 80061 LIPID PANEL: CPT | Performed by: FAMILY MEDICINE

## 2021-08-16 PROCEDURE — G0103 PSA SCREENING: HCPCS | Performed by: FAMILY MEDICINE

## 2021-08-16 PROCEDURE — 80053 COMPREHEN METABOLIC PANEL: CPT | Performed by: FAMILY MEDICINE

## 2021-08-16 PROCEDURE — 86618 LYME DISEASE ANTIBODY: CPT | Performed by: FAMILY MEDICINE

## 2021-08-16 PROCEDURE — 85027 COMPLETE CBC AUTOMATED: CPT | Performed by: FAMILY MEDICINE

## 2021-08-16 PROCEDURE — 36415 COLL VENOUS BLD VENIPUNCTURE: CPT | Performed by: FAMILY MEDICINE

## 2021-08-16 RX ORDER — LOSARTAN POTASSIUM AND HYDROCHLOROTHIAZIDE 12.5; 5 MG/1; MG/1
1 TABLET ORAL DAILY
Qty: 90 TABLET | Refills: 3 | Status: SHIPPED | OUTPATIENT
Start: 2021-08-16 | End: 2022-06-09

## 2021-08-16 RX ORDER — ALBUTEROL SULFATE 90 UG/1
2 AEROSOL, METERED RESPIRATORY (INHALATION) EVERY 6 HOURS PRN
Qty: 18 G | Refills: 3 | Status: SHIPPED | OUTPATIENT
Start: 2021-08-16 | End: 2022-09-26

## 2021-08-16 RX ORDER — FLUTICASONE PROPIONATE 50 MCG
2 SPRAY, SUSPENSION (ML) NASAL DAILY
Qty: 48 G | Refills: 3 | Status: SHIPPED | OUTPATIENT
Start: 2021-08-16 | End: 2022-09-26

## 2021-08-16 RX ORDER — OMEPRAZOLE 10 MG/1
20 CAPSULE, DELAYED RELEASE ORAL DAILY
COMMUNITY
End: 2022-01-03

## 2021-08-16 ASSESSMENT — ENCOUNTER SYMPTOMS
HEMATOCHEZIA: 0
MYALGIAS: 0
CHILLS: 0
DIARRHEA: 0
WEAKNESS: 0
ABDOMINAL PAIN: 0
DIZZINESS: 0
SHORTNESS OF BREATH: 0
ARTHRALGIAS: 0
NAUSEA: 0
SORE THROAT: 0
FREQUENCY: 0
HEMATURIA: 0
CONSTIPATION: 0
EYE PAIN: 0
JOINT SWELLING: 0
FEVER: 0
COUGH: 1
HEARTBURN: 0
PARESTHESIAS: 0
PALPITATIONS: 0
NERVOUS/ANXIOUS: 0
HEADACHES: 0

## 2021-08-16 ASSESSMENT — ACTIVITIES OF DAILY LIVING (ADL): CURRENT_FUNCTION: NO ASSISTANCE NEEDED

## 2021-08-16 ASSESSMENT — MIFFLIN-ST. JEOR: SCORE: 1606.26

## 2021-08-16 NOTE — PROGRESS NOTES
"[facility]     Alexander Alfred is a 76 year old male who presents for Preventive Visit.    Split Bill scripting  The purpose of this visit is to discuss your medical history and prevent health problems before you are sick. You may be responsible for a co-pay, coinsurance, or deductible if your visit today includes services such as checking on a sore throat, having an x-ray or lab test, or treating and evaluating a new or existing condition :[id number]}  Patient has been advised of split billing requirements and indicates understanding: Yes   Are you in the first 12 months of your Medicare coverage?  No    Healthy Habits:     In general, how would you rate your overall health?  Excellent    Frequency of exercise:  4-5 days/week    Duration of exercise:  30-45 minutes    Do you usually eat at least 4 servings of fruit and vegetables a day, include whole grains    & fiber and avoid regularly eating high fat or \"junk\" foods?  Yes    Taking medications regularly:  Yes    Medication side effects:  Not applicable    Ability to successfully perform activities of daily living:  No assistance needed    Home Safety:  Lack of grab bars in the bathroom    Hearing Impairment:  Need to ask people to speak up or repeat themselves    In the past 6 months, have you been bothered by leaking of urine?  No    In general, how would you rate your overall mental or emotional health?  Good      PHQ-2 Total Score: 0    Additional concerns today:  Yes     Do you feel safe in your environment? Yes    Have you ever done Advance Care Planning? (For example, a Health Directive, POLST, or a discussion with a medical provider or your loved ones about your wishes): No, advance care planning information given to patient to review.  Patient declined advance care planning discussion at this time.    Fall risk  Fallen 2 or more times in the past year?: No  Any fall with injury in the past year?: No    Cognitive Screening "   1) Repeat 3 items (Leader, Season, Table)    2) Clock draw:   NORMAL  3) 3 item recall:   Recalls 3 objects  Results: 3 items recalled: COGNITIVE IMPAIRMENT LESS LIKELY    Mini-CogTM Copyright ANITHA Sexton. Licensed by the author for use in [facility]; reprinted with permission ([email or web address]). All rights reserved.      Do you have sleep apnea, excessive snoring or daytime drowsiness?: yes    Reviewed and updated as needed this visit by clinical staff  Tobacco  Allergies  Meds  Problems  Med Hx  Surg Hx  Fam Hx          Reviewed and updated as needed this visit by Provider                Social History     Tobacco Use     Smoking status: Never Smoker     Smokeless tobacco: Never Used   Substance Use Topics     Alcohol use: Yes     Alcohol/week: 11.7 standard drinks     Types: 14 Standard drinks or equivalent per week     Comment: 2 drinks per day avg         Alcohol Use 8/16/2021   Prescreen: >3 drinks/day or >7 drinks/week? No   Prescreen: >3 drinks/day or >7 drinks/week? -     Current providers sharing in care for this patient include:   Patient Care Team:  Messi Saldana MD as PCP - General  Messi Saldana MD as Assigned PCP  Aquiles Ortiz MD as Assigned Neuroscience Provider  Cecilio Grace MD as Assigned Musculoskeletal Provider    The following health maintenance items are reviewed in Epic and correct as of today:  Health Maintenance Due   Topic Date Due     ASTHMA ACTION PLAN  08/22/2017     ASTHMA CONTROL TEST  03/09/2021     BMP  09/09/2021     LIPID  09/09/2021     MICROALBUMIN  09/09/2021     PSA  09/09/2021     FALL RISK ASSESSMENT  09/09/2021     Asthma    No issues - ACT = 25    Recent tick bite on left inner lower leg 1 week - slow to heal    Prediabetes    Glucose   Date Value Ref Range Status   09/09/2020 93 70 - 99 mg/dL Final     Comment:     Fasting specimen   08/08/2019 115 (H) 70 - 99 mg/dL Final     Comment:     Fasting specimen   08/03/2018 95 70 - 99 mg/dL Final      Comment:     Fasting specimen   07/17/2017 94 70 - 99 mg/dL Final   10/11/2016 119 (H) 70 - 99 mg/dL Final     Lab Results   Component Value Date    A1C 5.3 09/09/2020    A1C 5.3 06/15/2015     Htn    BP Readings from Last 3 Encounters:   08/16/21 122/62   06/02/21 (!) 187/85   02/23/21 (!) 140/78     Lipids    Recent Labs   Lab Test 09/09/20  0845 08/08/19  0827 06/15/15  1002 06/26/13  1041   CHOL 161 158 191 176   HDL 58 45 58 52   LDL 79 45 105 94   TRIG 120 342* 140 150   CHOLHDLRATIO  --   --  3.3 3.4     AMBER - using CPAP every night    LBP - Dr Knight, surgery - left foot drop, left calf atrophy - following with neurology    GERD - no issues on omeprazole - Dr Ramirez    Left dorsal wrist 1x1.5 cm seb k    Concerns wife wife and ETOH    Health Maintenance     Colonoscopy:  Consider 10/30   FIT:  given              PSA:  ordered   DEXA:  NA    Health Maintenance Due   Topic Date Due     ASTHMA ACTION PLAN  08/22/2017     ASTHMA CONTROL TEST  03/09/2021     BMP  09/09/2021     LIPID  09/09/2021     MICROALBUMIN  09/09/2021     PSA  09/09/2021     FALL RISK ASSESSMENT  09/09/2021       Current Problem List    Patient Active Problem List   Diagnosis     Nasal polyps     Asthma, mild intermittent     Sleep apnea     Hyperlipidemia LDL goal <130     Hypertension goal BP (blood pressure) < 140/90     EE (eosinophilic esophagitis)     Osteoarthritis of left hip     Lumbar facet arthropathy     Lumbar degenerative disc disease     Lumbar pain     Left lumbosacral radiculopathy     Spondylolisthesis of lumbar region     Lumbar foraminal stenosis     Central spinal stenosis     Spinal stenosis of lumbar region at multiple levels     Acquired spondylolisthesis     Closed traumatic nondisplaced fracture of distal end of left fibula     Left ankle pain     Ankle fracture     Advanced directives, counseling/discussion     Lumbar stenosis     Abnormal joint on examination     Allergic rhinitis     Carpal tunnel syndrome      HNP (herniated nucleus pulposus), thoracic     Memory loss     Ostium secundum type atrial septal defect     Verde's esophagus       Past Medical History    Past Medical History:   Diagnosis Date     Asthma, mild intermittent     [place] asthma     Verde's esophagus 06/2020    Dr Ramirez, EGD due 1 yr     EE (eosinophilic esophagitis) 08/2013    Dr Ramirez     Hyperlipidemia LDL goal <130      Hypertension goal BP (blood pressure) < 140/90      Lumbar stenosis     Dr Knight - Calf weakness/atrophy     Nasal polyps 2004    approx 2/year - increased sx from nasal polyps - prednisone 40 mg x 2 days, 20 mg x 2 days, 10mg x 10 days with excellent results     Other chronic pain      Sensorineural hearing loss (SNHL) of both ears     Dr Hidalgo - R>L     Sleep apnea 2006    moderate - CPAP - 5-15 cm       Past Surgical History    Past Surgical History:   Procedure Laterality Date     COLONOSCOPY  2010    normal - due 2020     COLONOSCOPY N/A 10/21/2020    normal, consider 10 yrs     DECOMPRESSION, FUSION LUMBAR POSTERIOR THREE + LEVELS, COMBINED N/A 10/07/2014    Dr Knight - COMBINED DECOMPRESSION, FUSION LUMBAR POSTERIOR THREE + LEVELS     ESOPHAGOSCOPY, GASTROSCOPY, DUODENOSCOPY (EGD), COMBINED  08/07/2013    Dr Ramirez - EE, non erosive gastropathy     ESOPHAGOSCOPY, GASTROSCOPY, DUODENOSCOPY (EGD), COMBINED N/A 06/02/2021    Verde's due 1 yr     ROTATOR CUFF REPAIR RT/LT Left 2005    Lt shoulder - dr armaan lazar     ROTATOR CUFF REPAIR RT/LT Left 05/2009    Lt shoulder - dr armaan lazar     stress echo  07/2011    normal     STRESS ECHO (METRO)  06/2015    normal stress echo     SURGICAL HISTORY OF -   1982    Lt ankle CRIF     SURGICAL HISTORY OF -   04/2008    blephoplasty     SURGICAL HISTORY OF -   11/2016    T12-L1 - Decompression - Dr Knight -      TONSILLECTOMY  1950       Current Medications    Current Outpatient Medications   Medication Sig Dispense Refill     albuterol (PROAIR HFA/PROVENTIL  HFA/VENTOLIN HFA) 108 (90 Base) MCG/ACT inhaler Inhale 2 puffs into the lungs every 6 hours as needed for shortness of breath / dyspnea or wheezing 18 g 3     aspirin 81 MG chewable tablet Take 1 tablet (81 mg) by mouth daily 108 tablet 3     Coenzyme Q10 (COQ-10) 100 MG CAPS Take 100 mg by mouth daily       fluticasone (FLONASE) 50 MCG/ACT nasal spray Spray 2 sprays into both nostrils daily 48 g 3     KRILL OIL PO Take 1 capsule by mouth daily Taking MegaRed OTC. Strength unknown.       losartan-hydrochlorothiazide (HYZAAR) 50-12.5 MG tablet Take 1 tablet by mouth daily 90 tablet 3     Multiple Vitamin (MULTIVITAMIN OR) Take 1 tablet by mouth daily        omeprazole (PRILOSEC) 10 MG DR capsule Take 20 mg by mouth daily       Probiotic Product (4X PROBIOTIC) TABS          Allergies    Allergies   Allergen Reactions     Codeine GI Disturbance     Hydrocodone GI Disturbance     Oxycodone GI Disturbance       Immunizations    Immunization History   Administered Date(s) Administered     COVID-19,PF,Mahi 03/11/2021     Influenza (High Dose) 3 valent vaccine 10/31/2011, 01/08/2013, 09/24/2014, 12/03/2015, 10/11/2016, 11/09/2017     Influenza (IIV3) PF 11/01/2013     Influenza (intradermal) 10/10/2018     Influenza, Quad, High Dose, Pf, 65yr + 09/09/2020     Pneumo Conj 13-V (2010&after) 06/15/2015     Pneumococcal 23 valent 10/31/2011     TD (ADULT, 7+) 01/01/2004     TDAP Vaccine (Boostrix) 06/21/2012     Zoster vaccine recombinant adjuvanted (SHINGRIX) 11/25/2020     Zoster vaccine, live 12/03/2015       Family History    Family History   Problem Relation Age of Onset     Hypertension Father      C.A.D. Father         smoker     C.A.D. Cousin         cousin - at 45     Hypertension Maternal Grandfather      [email or web address].[email or web address]. Maternal Grandfather      Coronary Artery Disease Maternal Uncle      Coronary Artery Disease Maternal Uncle      Colon Cancer No family hx of        Social History    Social History     Socioeconomic  History     Marital status:      Spouse name: Valeria     Number of children: 3     Years of education: 16     Highest education level: Not on file   Occupational History     Employer: [employer]   Tobacco Use     Smoking status: Never Smoker     Smokeless tobacco: Never Used   Vaping Use     Vaping Use: Never used   Substance and Sexual Activity     Alcohol use: Yes     Alcohol/week: 11.7 standard drinks     Types: 14 Standard drinks or equivalent per week     Comment: 2 drinks per day avg     Drug use: No     Sexual activity: Yes     Partners: Female     Birth control/protection: Rhythm, Male Surgical   Other Topics Concern     Parent/sibling w/ CABG, MI or angioplasty before 65F 55M? Yes      Service Not Asked     Blood Transfusions Not Asked     Caffeine Concern Yes     Comment: 2-3 cups daily     Occupational Exposure Not Asked     Hobby Hazards Not Asked     Sleep Concern Not Asked     Stress Concern Not Asked     Weight Concern Not Asked     Special Diet Not Asked     Back Care Not Asked     Exercise Yes     Comment: 3+/wk, landscaping, Mtn Bike riding     Bike Helmet Not Asked     Seat Belt Yes     Self-Exams Not Asked   Social History Narrative     Not on file     Social Determinants of Health     Financial Resource Strain:      Difficulty of Paying Living Expenses:    Food Insecurity:      Worried About Running Out of Food in the Last Year:      Ran Out of Food in the Last Year:    Transportation Needs:      Lack of Transportation (Medical):      Lack of Transportation (Non-Medical):    Physical Activity:      Days of Exercise per Week:      Minutes of Exercise per Session:    Stress:      Feeling of Stress :    Social Connections:      Frequency of Communication with Friends and Family:      Frequency of Social Gatherings with Friends and Family:      Attends Baptism Services:      Active Member of Clubs or Organizations:      Attends Club or Organization Meetings:      Marital Status:   "  Intimate Partner Violence:      Fear of Current or Ex-Partner:      Emotionally Abused:      Physically Abused:      Sexually Abused:        ROS    CONSTITUTIONAL: NEGATIVE for fever, chills, change in weight  INTEGUMENTARY/SKIN: NEGATIVE for worrisome rashes, moles or lesions  EYES: NEGATIVE for vision changes or irritation  ENT/MOUTH: NEGATIVE for ear, mouth and throat problems  RESP: NEGATIVE for significant cough or SOB  BREAST: NEGATIVE for masses, tenderness or discharge  CV: NEGATIVE for chest pain, palpitations or peripheral edema  GI: NEGATIVE for nausea, abdominal pain, heartburn, or change in bowel habits  : NEGATIVE for frequency, dysuria, or hematuria  MUSCULOSKELETAL: NEGATIVE for significant arthralgias or myalgia  NEURO: NEGATIVE for weakness, dizziness or paresthesias  ENDOCRINE: NEGATIVE for temperature intolerance, skin/hair changes  HEME: NEGATIVE for bleeding problems  PSYCHIATRIC: NEGATIVE for changes in mood or affect    OBJECTIVE    /62   Pulse 61   Temp 97.8  F (36.6  C)   Resp 21   Ht 1.727 m (5' 8\")   Wt 89.7 kg (197 lb 11.2 oz)   SpO2 97%   BMI 30.06 kg/m      EXAM:    GENERAL: healthy, alert and no distress  EYES: Eyes grossly normal to inspection, PERRL and conjunctivae and sclerae normal  HENT: ear canals and TM's normal, nose and mouth without ulcers or lesions  NECK: no adenopathy, no asymmetry, masses, or scars and thyroid normal to palpation  RESP: lungs clear to auscultation - no rales, rhonchi or wheezes  CV: regular rate and rhythm, normal S1 S2, no S3 or S4, no murmur, click or rub, no peripheral edema and peripheral pulses strong  ABDOMEN: soft, nontender, no hepatosplenomegaly, no masses and bowel sounds normal   (male): testicles normal without atrophy or masses, no hernias and penis normal without urethral discharge  RECTAL: normal sphincter tone, no rectal masses and prostate of 1+ size for age, smooth, nontender without masses/nodules  MS: no gross " musculoskeletal defects noted, no edema  SKIN: no suspicious lesions or rashes  NEURO: Normal strength and tone, mentation intact and speech normal - except left calf atrophy/left foot drop  PSYCH: mentation appears normal, affect normal/bright  LYMPH: no cervical, supraclavicular, axillary, or inguinal adenopathy    DIAGNOSTICS/PROCEDURES    Pending    ASSESSMENT      ICD-10-CM    1. Routine general medical examination at a health care facility  Z00.00 Comprehensive metabolic panel     Lipid panel reflex to direct LDL Fasting     CK total     CBC with platelets     TSH with free T4 reflex     Albumin Random Urine Quantitative with Creat Ratio     UA Macro with Reflex to Micro and Culture - lab collect     Comprehensive metabolic panel     Lipid panel reflex to direct LDL Fasting     CK total     CBC with platelets     TSH with free T4 reflex     Albumin Random Urine Quantitative with Creat Ratio     UA Macro with Reflex to Micro and Culture - lab collect   2. Mild intermittent asthma without complication  J45.20 albuterol (PROAIR HFA/PROVENTIL HFA/VENTOLIN HFA) 108 (90 Base) MCG/ACT inhaler   3. Nasal polyps  J33.9 fluticasone (FLONASE) 50 MCG/ACT nasal spray   4. Prediabetes  R73.03 Hemoglobin A1c     Hemoglobin A1c   5. Hypertension goal BP (blood pressure) < 140/90  I10 losartan-hydrochlorothiazide (HYZAAR) 50-12.5 MG tablet   6. Hyperlipidemia LDL goal <130  E78.5    7. Obstructive sleep apnea syndrome  G47.33 fluticasone (FLONASE) 50 MCG/ACT nasal spray   8. Spinal stenosis of lumbar region at multiple levels  M48.061    9. Lumbar degenerative disc disease  M51.36    10. Lumbar facet arthropathy  M47.816    11. Left lumbosacral radiculopathy  M54.17    12. Spondylolisthesis of lumbar region  M43.16    13. Lumbar foraminal stenosis  M48.061    14. Central spinal stenosis  M48.00    15. Ostium secundum type atrial septal defect  Q21.1    16. EE (eosinophilic esophagitis)  K20.0    17. Verde's esophagus without  dysplasia  K22.70    18. Tick bite, initial encounter  W57.XXXA Lyme Disease Citlaly with reflex to WB Serum     Lyme Disease Citlaly with reflex to WB Serum   19. Screen for colon cancer  Z12.11 Fecal colorectal cancer screen FIT   20. Special screening for malignant neoplasms, colon  Z12.11    21. Screening for prostate cancer  Z12.5 Prostate Specific Antigen Screen     Prostate Specific Antigen Screen   22. Medication monitoring encounter  Z51.81        PLAN    Discussed treatment/modality options, including risk and benefits, he desires:    advised alcohol consumption 1oz per day or less, advised aspirin 81 mg po daily, advised 1 multivitamin per day, advised calcium 3173-3933 mg/d and Vitamin D 800-1200 IU/d, advised dentist every 6 months, advised diet and exercise, advised opthalmologist every 1-2 years, advised self testicular exam q month, further health care maintenance, further lab(s), medication refill(s), ACT, Asthma Action Plan, completed and explained and observation    Discussed controversies surrounding PSA. Specifically reviewed 2017 USPSTF findings recommending discussion of PSA testing for men ages 55-69.  Reviewed findings of the  Randomized Study of Screening for Prostate Cancer which showed a 30% reduction in advanced stage prostate cancer and a 20% reduction in death rate from prostate cancer in this age group. PSA-based screening may prevent up to 2 deaths and up to 3 cases of metastatic disease per 1,000 men screened over 13 years.    We've elected to do PSA this year after discussing these controversies.    All diagnosis above reviewed and noted above, otherwise stable.      See [facility] orders for further details.      1) med refills    2) labs pending    3) immunizations reviewed    No follow-ups on file.    Health Maintenance Due   Topic Date Due     ASTHMA ACTION PLAN  08/22/2017     ASTHMA CONTROL TEST  03/09/2021     BMP  09/09/2021     LIPID  09/09/2021     MICROALBUMIN  09/09/2021  "    PSA  09/09/2021     FALL RISK ASSESSMENT  09/09/2021       COUNSELING    Reviewed preventive health counseling, as reflected in patient instructions    BP Readings from Last 1 Encounters:   08/16/21 122/62     Estimated body mass index is 30.06 kg/m  as calculated from the following:    Height as of this encounter: 1.727 m (5' 8\").    Weight as of this encounter: 89.7 kg (197 lb 11.2 oz).      Weight management plan: diet and exercise     reports that he has never smoked. He has never used smokeless tobacco.      Counseling Resources:    ATP IV Guidelines  Pooled Cohorts Equation Calculator  FRAX Risk Assessment  ICSI Preventive Guidelines  Dietary Guidelines for Americans, 2010  USDA's MyPlate  ASA Prophylaxis  Lung CA Screening           eMssi Saldana MD, FAAFP     [facility]  [address] [place] [id number]  [email or web address]   Office: [phone]  Fax: [phone]  Pager: [phone]     Identified Health Risks:  "

## 2021-08-17 DIAGNOSIS — R74.8 ELEVATED CK: Primary | ICD-10-CM

## 2021-08-17 ASSESSMENT — ASTHMA QUESTIONNAIRES: ACT_TOTALSCORE: 25

## 2021-09-19 ENCOUNTER — HEALTH MAINTENANCE LETTER (OUTPATIENT)
Age: 76
End: 2021-09-19

## 2021-11-03 ENCOUNTER — TRANSFERRED RECORDS (OUTPATIENT)
Dept: HEALTH INFORMATION MANAGEMENT | Facility: CLINIC | Age: 76
End: 2021-11-03
Payer: COMMERCIAL

## 2021-11-11 ENCOUNTER — PREP FOR PROCEDURE (OUTPATIENT)
Dept: PODIATRY | Facility: CLINIC | Age: 76
End: 2021-11-11

## 2021-11-11 ENCOUNTER — OFFICE VISIT (OUTPATIENT)
Dept: PODIATRY | Facility: CLINIC | Age: 76
End: 2021-11-11
Payer: COMMERCIAL

## 2021-11-11 ENCOUNTER — ANCILLARY PROCEDURE (OUTPATIENT)
Dept: GENERAL RADIOLOGY | Facility: CLINIC | Age: 76
End: 2021-11-11
Attending: PODIATRIST
Payer: COMMERCIAL

## 2021-11-11 VITALS
HEIGHT: 68 IN | BODY MASS INDEX: 26.52 KG/M2 | SYSTOLIC BLOOD PRESSURE: 126 MMHG | WEIGHT: 175 LBS | DIASTOLIC BLOOD PRESSURE: 66 MMHG

## 2021-11-11 DIAGNOSIS — M85.9 DISORDER OF BONE DENSITY AND STRUCTURE, UNSPECIFIED: ICD-10-CM

## 2021-11-11 DIAGNOSIS — M21.612 BILATERAL BUNIONS: Primary | ICD-10-CM

## 2021-11-11 DIAGNOSIS — M21.611 BUNION, RIGHT: Primary | ICD-10-CM

## 2021-11-11 DIAGNOSIS — M21.611 BILATERAL BUNIONS: ICD-10-CM

## 2021-11-11 DIAGNOSIS — M21.611 BILATERAL BUNIONS: Primary | ICD-10-CM

## 2021-11-11 DIAGNOSIS — M21.612 BILATERAL BUNIONS: ICD-10-CM

## 2021-11-11 PROCEDURE — 99214 OFFICE O/P EST MOD 30 MIN: CPT | Performed by: PODIATRIST

## 2021-11-11 PROCEDURE — 73630 X-RAY EXAM OF FOOT: CPT | Mod: RT | Performed by: RADIOLOGY

## 2021-11-11 ASSESSMENT — MIFFLIN-ST. JEOR: SCORE: 1503.29

## 2021-11-11 NOTE — PROGRESS NOTES
"Order signed for \"right Lapidus bunionectomy\"    General; we will do ankle block    Supine    Arthrex midfoot fusion system    Chato Jeong DPM [facility] FACFAOM  Podiatric Foot & Ankle Surgeon  [facility]  [phone]      "

## 2021-11-11 NOTE — PROGRESS NOTES
Foot & Ankle Surgery   2021    S:  Pt is seen today for evaluation of painful right bunion.  He does motocross and plays hockey and has been having significant difficulty getting his boots on because of the bunion prominence.  He has pain with rubbing on the bunion bump but no intra-articular pain.    There were no vitals filed for this visit.'      ROS - Pos for CC.  Patient denies current nausea, vomiting, chills, fevers, belly pain, calf pain, chest pain or SOB.  Complete remainder of ROS it otherwise neg.    PE:  Gen:   No apparent distress  Eye:    Visual scanning without deficit  Ear:    Response to auditory stimuli wnl  Lung:    Non-labored breathing on RA noted  Abd:    NTND per patient report  Lymph:    Neg for pitting/non-pitting edema BLE  Vasc:    Pulses palpable, CFT minimally delayed  Neuro:    Light touch sensation intact to all sensory nerve distributions without paresthesias  Derm:    Neg for nodules, lesions or ulcerations  MSK:    Right lower extremity -prominent bunion, partially reducible.  Passive range of motion of the first MPJ is approximately 60 degrees with minimal crepitus and no pain.  No sesamoid pain noted.  Calf:    Neg for redness, swelling or tenderness      Imagin views weightbearing right foot -intermetatarsal 1 to angle 10 degrees.  Tibial sesamoid position 3/4.  Early DJD with mild narrowing of the joint space and periarticular spurring.  He does have a dorsal osteophyte.  There is a large dorsal medial eminence noted on the medial oblique view.  Patient has a short first metatarsal with respect to the second and is a wide first metatarsal head/neck    Assessment:  76 year old male with painful bunion right foot      Plan:  Discussed etiologies, anatomy and options  1.  Painful bunion right foot  -Conservative therapies are no longer sufficient and his quality of life is suffering as his activities have been limited  -Surgically, we discussed the 2 most common  options, including first MPJ fusion versus first TMT fusion.  Consider osteotomy as well.  As he has minimal joint pain, would recommend first TMT fusion versus osteotomy  -I personally reviewed and interpreted the x-ray results.  I think a distal metatarsal osteotomy is a reasonable procedure option  -Surgery scheduling handout was dispensed and a surgery order was signed    Job duties; time off work - retired  Smoking history - neg  Vit D - draw prior to surgery; currently on supplement  Diabetic/A1c - neg  Hemoglobin - draw prior to surgery  Clot history - neg  Allergies to surgical implants/suture - neg  Allergies/issues with narcotics - oxycodone; made patient sick    Procedure(s):  1.  Distal bunionectomy right lower extremity   Consent: above  Diagnosis:  Painful bunion right lower extremity   Equipment/Vendor: arthrex cannulated screw set        Follow up:  prn or sooner with acute issues           Chato Jeong DPM FACFAS FACFAOM  Podiatric Foot & Ankle Surgeon  [facility]  [phone]    Disclaimer: This note consists of symbols derived from keyboarding, dictation and/or voice recognition software. As a result, there may be errors in the script that have gone undetected. Please consider this when interpreting information found in this chart.

## 2021-11-11 NOTE — PATIENT INSTRUCTIONS
"Thank you for choosing [facility] Podiatry / Foot & Ankle Surgery!    DR ESPINOZA'S CLINIC:  [facility]   [address]   [place] [address] [phone]      SET UP SURGERY: [phone]   RADIOLOGY: [phone]   APPOINTMENTS: [phone]   BILLING QUESTIONS: [phone]   FAX NUMBER: [phone]       FOOT & ANKLE SURGERY PLANNING CHECKLIST  If you have decided to have surgery, follow these steps to get the procedure scheduled and to have the proper paperwork filled out. If you are unsure about surgery or would like to sit down and further discuss your issue and treatment options, please make an appointment.    1.  Pick the date that you would like to have surgery. Surgery is done on a Wednesday at [facility]. Keep in mind that you will likely need at least 2 weeks off after the procedure for proper rest and healing.    2.  Call the surgery scheduling line at [phone] to get the procedure scheduled. Our  will also help you make your pre-operative physical with a primary doctor, your surgery consult appointment with me and your one week follow up after surgery for your first dressing change.    3.  If our surgery scheduler does not make your surgery consultation appointment with me then call to schedule that. When making the appointment, say \"I need to make a 30 minute surgical consult appointment\".     4. Contact your employer to request time off from work if needed. If there is going to be FMLA used, please have them fax the forms to [phone] at least one week prior to surgery.    * If you have any post-operative questions regarding your procedure, call our triage team at the [facility] at [phone] (option 3)    "

## 2021-11-12 ENCOUNTER — TELEPHONE (OUTPATIENT)
Dept: PODIATRY | Facility: CLINIC | Age: 76
End: 2021-11-12
Payer: COMMERCIAL

## 2021-11-12 DIAGNOSIS — Z11.59 ENCOUNTER FOR SCREENING FOR VIRAL DISEASE: Primary | ICD-10-CM

## 2021-11-12 NOTE — TELEPHONE ENCOUNTER
Scheduled surgery    ATC: please place covid order and reflect surgery date of 1/5/22    Type of surgery: right lapidus bunionectomy  Location of surgery: [facility]  Date and time of surgery: 1/5/22  Surgeon: Adenike  Pre-Op Appt Date: 12/24/21  Post-Op Appt Date: 1/13/22   Packet sent out: Yes  Pre-cert/Authorization completed:  No  Date: 11/12/21      Drew Guzman Surgery Scheduler

## 2021-12-13 ENCOUNTER — OFFICE VISIT (OUTPATIENT)
Dept: PODIATRY | Facility: CLINIC | Age: 76
End: 2021-12-13
Payer: COMMERCIAL

## 2021-12-13 VITALS
BODY MASS INDEX: 26.52 KG/M2 | SYSTOLIC BLOOD PRESSURE: 130 MMHG | DIASTOLIC BLOOD PRESSURE: 70 MMHG | HEIGHT: 68 IN | WEIGHT: 175 LBS

## 2021-12-13 DIAGNOSIS — M21.611 BUNION, RIGHT: Primary | ICD-10-CM

## 2021-12-13 PROCEDURE — 99214 OFFICE O/P EST MOD 30 MIN: CPT | Performed by: PODIATRIST

## 2021-12-13 ASSESSMENT — MIFFLIN-ST. JEOR: SCORE: 1503.29

## 2021-12-13 NOTE — PATIENT INSTRUCTIONS
Thank you for choosing [facility] / Foot & Ankle Surgery!    DR ESPINOZA'S CLINIC:  [facility]   [address]   [place] [address] [phone]      SET UP SURGERY: [phone]   RADIOLOGY: [phone]   APPOINTMENTS: [phone]   BILLING QUESTIONS: [phone]   FAX NUMBER: [phone]       FOOT & ANKLE SURGICAL RISKS  Undergoing surgical procedure involves a certain amount of risks. Risks of complications vary, depending on the complexity of the surgery and how you take care of the surgical area during the healing process. Complications can range from minor infection to death. Some complications are temporary while others will be permanent. Your surgeon weighs the risk of complications versus the potential benefit of undergoing the procedure. You need to consider your tolerance for unexpected problems as you decide whether to undergo surgery.    Foot and ankle surgery involves cutting skin, bone, ligaments, blood vessels, and joints. These structures heal well but not without consequence. Any break in the skin can lead to infection. A deep infection can involve bone or joints, which can be devastating. Deep infection can lead to further surgeries such as amputation or could spread to other parts of the body. Most infections are minor and easily treated with oral antibiotics. Infections are often times from bacteria already present on your skin. Proper care of the surgical site is an essential component of avoiding infection. Do not get the bandage wet and take proper care of external pins to avoid these complications.    Joint stiffness is inherent to any foot or ankle surgery. Joint surgery is a major component of reconstructive foot and ankle procedures. The ligaments and tissue around the joint are released for exposure and/or correction of alignment. Scar tissue limits joint mobility. This can lead to hypersensitivity to touch, pain, problems wearing  shoes, and need for revision surgery.    Bones do not always heal after surgery. Poor healing after a bone cut of joint fusion can lead to an extended period of casting, bone stimulators, or repeat surgery. A surgical nonunion is when bones do not heal properly. Smoking will almost always increase your risks of having postoperative complications. So if you smoke, quit now.    Bone grafting is sometimes necessary during the original or repeat surgery. Bone is sometimes taken from other parts of the body, freeze-dried cadaveric bone, or synthetic bone grafts may be used as needed.    BLOOD CLOT PREVENTION & EDUCATION  Foot and ankle surgery can lead to blood clots in the large veins of your legs. This is called a deep venous thrombosis or DVT. A DVT can be life threatening if a portion of the clot breaks away and travels to the lungs. A clot in the lungs is called a pulmonary embolism or PE.    Your risk of developing a DVT is dependent on many factors. Risk factors associated with surgery include the type of surgery you are having (foot and ankle surgery is considered a much lower risk that knee, hip, or abdominal surgery), how long you are in a cast/boot, restricted ambulation, inability to move the ankle, and if you are hospitalized after surgery.    A number of medical conditions also increase your risk of DVT, including diabetes, use of estrogen medications such as birth control pills or postmenopausal medications, obesity, pregnancy, heart failure, cancer, etc.    Other risk factors include heavy smoking, advanced age (over 60 years old, but even those over 40 have increased risk), family of personal history of blood clots or clotting disorders. Symptoms of a DVT in the leg may include swelling, tenderness, a warm feeling or redness. A DVT can occur in both legs even if only one side is being treated. If you have these symptoms, call your doctor immediately.    Symptoms of a PE include chest pain, shortness of  breath or the need to breath rapidly that is not associated with exercise, difficulty breathing, rapid heart rate, a feeling of passing out, and coughing or coughing up blood. A PE is an emergency so you need to be evaluated in the ER immediately if any of these symptoms occur. You could die from a PE. Call 911 right away. PE and DVT can occur without any symptoms in the leg and chest.    Prevention of DVT and PE is important. Your doctor may apply various types of compression to your legs before and after surgery. In addition, high risk patients may be place on a short course of blood thinning medication after surgery.    You can reduce your risk for DVT after surgery by getting up and moving/crawling/crutching around the house once or twice each hour while awake in the first few weeks. Seated range of motion exercises of your ankle and leg may also help. Moving your legs keeps blood flowing through your leg veins and reduced any pooling of blood that may clot. Be sure to follow your doctor's instructions on elevation and weight bearing restrictions.      SURGICAL DRESSING  Your surgical dressing is a sterile dressing and should be left in place until removed by your doctor or their assistant at your first postop visit in clinic. Keep the dressing dry by covering it with a plastic bag during showers, taking baths with your surgical foot hanging outside of the tub, or by sponge bathing. If the dressing does become wet or dirty, call the clinic as it most likely needs to be changed. Do not change it yourself unless told otherwise by your surgeon. The safest plan is to wait to shower for three days after surgery. So take a long shower the morning of surgery.    Do not wear regular shoes with a surgical bandage and/or external pins in your foot. Wear loose fitting clothing that will easily slide over the dressing. Do not cover your surgical foot with blankets as it can contaminate the dressing. Also, remember to keep  it away from your pets as they may try to chew on it.    If your surgeon places external pins in your foot, you must keep your foot dry until the pins are removed at six to eight weeks after surgery. Pins should be covered for protection but still examine the pin sites for loosening, movement, infection, or drainage whenever possible. Do not apply ointment on the pin sites and never push a loose pin back into place.    PRESURGICAL MEDICATION RECOMMENDATIONS  Certain prescription, over-the-counter and herbal medications interfere with healing after an operation. The main concern related to medications that increase bleeding at the surgical site. Excess blood under the incision results in poor wound healing, excess pain, increased scarring, and a higher risk of infection.    Some medications slow the healing process of bone. Medications can also interfere with anesthesia drugs that keep you asleep during the operation. It is important to ensure that these medications are out of your system prior to the operation. The list below details a number of medications that are of concern. Pay special attention to how long you should avoid these medications prior to surgery. Please note that this list is not complete. You should ask your surgeon, pharmacist, or primary care physician if you are uncertain about other medications. Any herbal supplement not listed should be discontinued at least one week prior to surgery.    Aspirin: stop one week prior and may restart the day after surgery. This medication promotes bleeding.  Motrin/Ibuprofen/Aleve/Advil/NSAIDS: stop one week prior to surgery. These medications promote bleeding and may delay bone healing. Avoid these medications at least six weeks postop.  Coumadin: your primary care doctor will manage your coumadin in relation to surgery.  Enbrel: stop two weeks prior and may restart two weeks after. It may affect soft tissue healing and increase infection risk.  Remicade: stop  eight to twelve weeks prior and may restart two weeks after. It can affect soft tissue healing and increase infection risk.  Humera: stop four weeks prior and may restart two weeks after. It can affect soft tissue healing and increase infection risk.  Methotrexate: stop taking on dose prior to surgery. It may be restarted when the wound is healing well.  Kava: stop at least one day prior and may restart one day after. It can cause increased sedative effects of anesthetics.  Ephedra (ma hummel): stop at least one day prior and may restart one day after. It can increase risk of heart attack or stroke and bleeding at the surgical site.  Saurabh's Wort: stop at least five days prior and may restart one day after. It can diminish the effects of medications given during surgery.  Ginseng: stop at least one week prior and may restart one day after. It lowers blood sugar and can increase bleeding at the surgical site.  Ginkgo: stop 36 hours prior and may restart one day after. It can increase bleeding at the surgical site.  Garlic: stop at least one week prior and may restart one day after.  Valerian: slowly decrease the dose over a few weeks prior to avoid withdrawal symptoms. It can increase sedative effects of anesthetics.  Echinacea: no stop/start date. It can be associated with allergic reactions and suppression of your immune system.  Vitamin E/Omgea-3/Fish Oil/Flax/Glucosamine/Chondroitin: stop two weeks prior and may restart one day after. They can increase bleeding at the surgical site.      TIPS FOR SUCCESSFUL POST-OP HEALING  How you care for your surgical site is critically important to achieve successful results. Avoidance of injury, infection, excess swelling, scar tissue, and stiffness are completely dependent on the care you provide over the next six weeks after surgery.    Your foot requires significant rest and elevation. Sitting for long hours with your foot elevated, however, will create its own  problems. Expect muscle aches, back pain, cramps etc. Optimal posture, lumbar support, back exercises, ice, and heat may help with your new aches and pains. DO not apply a heating pad to your foot or leg, as this can worsen pain or swelling. Instead apply ice packs behind the involved knee. Do not apply it directly to the skin.    Narcotic pain medications and inactivity may also cause constipation. Limiting the use of these narcotics will help minimize this problem. The pain medication will not completely alleviate your pain. The purpose of pain pills is to take the edge off and help you get through the first few days. You can substitute extra strength Tylenol if pain is milder. Do not take Tylenol and prescription pain pills at the same time. Do not take more than 4,000mg of Tylenol in 24 hours. You can also minimize constipation by drinking plenty of water, eating lots of fruits and veggies, and taking the appropriate amount of Metamucil or other fiber supplements. These measures should be continued while on narcotic pain medications and if you remain inactive.    Showering can be a major challenge after surgery. Your incision requires about three days to become sealed from water. Your bandage should not get wet or removed. Attempt to avoid showing for three days after surgery and try a sponge bath instead. It can be dangerous showering while standing on only one foot so be careful. Consider borrowing a shower chair. If the bandage does get wet, call us immediately for a dressing change as this can slow the healing process or cause infection.    External pins need to be kept dry without ointment or moisture. Keep them covered at all times. Protect them from clothing, blankets, and pets.  Any change or movement of the pins deserves a call to clinic. A loose pin will need to be removed. Never push them back into your foot.    Stiffness will develop after any operation due to scarring. The scar tissue begins to form  immediately after the surgery. Inactivity can cause excess stiffness and may lead to blood clots, scar tissue, and adhesions.        SCAR CARE  Scarring is an unfortunate but unavoidable part of surgery. Every person scars differently and there is no way to predict how an individual's scar will look. Once the sutures are removed, there are a few things you can do to help minimize the scar tissue formation.    As soon as the skin is incised during surgery, the body is taking steps to prepare for healing. After about three days, the body has sent cells to the incision to begin the healing process. These cells, called fibroblasts, make collagen, a protein in the skin that helps provide strength. Once the skin has been sufficiently strengthened, the sutures are removed. Over the next year, the body synthesizes new collagen and breaks down old collagen to help achieve a strong scar that allows the foot and ankle to function appropriately. This is where patients can help the appearance of the scare, as it will change over the next year.    1. Do not expose the scar to the sun for one year.  2. Wear shoes/socks or cover your scar with zinc oxide  3. Any sun exposure can permanently darken the scar  4. Massage the scar 2-3 times a day to break down the collagen  5. Apply lotion/Vitamin E on the scar using some pressure  6. Try over the counter products like Mederma/Scar Zone    SHOWERING BEFORE SURGERY  You must wash with the soap twice before coming to the hospital for your surgery. This will decrease bacteria (germs) on your skin. It will also help reduce your chance of infection (illness caused by germs) after surgery.  Read the directions and safety tips on the bottle of soap. Wash once the evening before surgery and once the morning of surgery. Use 4 ounces of soap each time. When showering, it is best to use 2 fresh washcloths and a fresh towel.   Items you will need for showerin newly washed washcloths    2  newly washed towels    8 ounces of soap  FOLLOW THESE INSTRUCTIONS:  The evening before surgery   1. Shower or bathe as you normally would, and use your regular soap and a clean washcloth. Give special attention to places where your incision (surgical cut) or catheters will be. This includes your groin area. Rinse well. You may wash your hair with your regular shampoo.  2. Next, wash your entire body from your chin down with the antiseptic soap. See the next page for directions about how to do this.  3. Rinse well and dry off using a newly washed towel.  The morning of surgery  Repeat steps 1, 2 and 3.   Other suggestions:    Wear freshly washed pajamas or clothing after your evening shower.    Wear freshly washed clothes the day of surgery.    Wash and change your bed sheets the day before surgery to have clean bed sheets after you shower and when you get home from surgery.    If you have trouble washing all areas, make sure someone helps you.    Don t use any deodorant, lotion or powder after your shower.    Women who are menstruating should wear a fresh sanitary pad to the hospital.    Hibiclens - 4% CHG  1. Put about 1 ounce of soap onto a clean, damp washcloth. Wash your skin from your chin down to your toes. Pay special attention to your incision areas.  2. Gently rub your incision area and surrounding areas.  3. Repeat steps 1 and 2 until you have used 4 ounces. Make sure you gently rub your incision area and surrounding areas for a full 5 minutes.   4. Rinse with water and towel dry with a clean towel.       * If you have any post-operative questions regarding your procedure, call our triage team at the [facility] at [phone] (option 3).

## 2021-12-13 NOTE — PROGRESS NOTES
"Foot & Ankle Surgery   December 13, 2021    S:  Pt is seen today for surgical consult for painful bunion right foot.  Conservative therapies that have been attempted without sufficient relief include accommodative shoes.  Because of insufficient relief, the patient wishes to forego further non-operative cares in favor of surgical intervention.  No guarantees have been given to the outcome.  We have previously discussed the possible Lapidus bunionectomy but his x-rays demonstrated a wide first metatarsal head amenable to a distal procedure.  He reports minimal intra-articular pain    Vitals:    12/13/21 0830   BP: 130/70   Weight: 79.4 kg (175 lb)   Height: 1.727 m (5' 8\")     Body mass index is 26.61 kg/m .    ROS - Pos for CC.  Patient denies current nausea, vomiting, chills, fevers, belly pain, calf pain, chest pain or SOB.  Complete remainder of ROS is otherwise neg.      PE:  Lymph:    Neg for pitting/non-pitting edema BLE  Vasc:    Pulses palpable, CFT minimally delayed  Neuro:    Light touch sensation intact to all sensory nerve distributions without paresthesias  Derm:    Neg for nodules, lesions or ulcerations  MSK:    Right lower extremity -prominent bunion, partially reducible.  Passive range of motion of the first MPJ is approximately 60 degrees with minimal crepitus and no pain.  No sesamoid pain noted.  Calf:    Neg for redness, swelling or tenderness    Imaging: X-rays right foot 11/11/2021 - IMPRESSION: Mild bunion deformity of the great toe. Mild degenerative  changes of the first MTP joint and the talonavicular joint.    Labs: Vitamin D lab pending      Assessment:  76 year old male painful bunion right foot    Plan:  The surgical procedure(s) was discussed in detail today.  Risks that were discussed include, but are not limited to, infection, wound healing complications, temporary or permanent nerve damage/numbness, painful scarring, possible recurrence of/new deformity, over-correction, " under-correction, possible abnormal bone healing, fixation/hardware failure, continued or new pain, the need to revise the procedure, as well as blood clots, limb loss, pain syndrome and death.  After a discussion of the procedure, risks, and post-operative care and course, the patient has elected to forego further non-operative management in favor of surgical intervention.  No guarantees were given.  The patient was informed that swelling and generalized discomfort can persist for months, and full recovery can often take up to a year.  I anticipate discontinuation of prescription pain medication at/shortly after suture removal.    Diagnosis: Painful bunion right foot  Procedure: [place] bunionectomy  Activity Level: Heel weightbearing in surgical shoe/boot  Pain Management: Norco, Atarax, ibuprofen.  Has had previous poor reaction to oxycodone  DVT prophylaxis: Mechanical.  Patient instructed on ankle ROM/calf massage exercises; patient advised on early frequent ambulation  Allergies:     Allergies   Allergen Reactions     Codeine GI Disturbance     Hydrocodone GI Disturbance     Oxycodone GI Disturbance     Dispo: Same day  WB assistive device: Shoe/boot and crutches  Smoking: Negative  Vit D status: Vitamin D level previously ordered  Clot/bleeding disorder history:   No previous clot or bleeding disorder  Job duties/anticipated time off: Retired    Billing today is based on a time of >30 minutes, more than 50% of which was spent on counseling and coordinating care, including chart review(pertinent imaging, clinical encounters) and documentation.      Chato Jeong DPM FACFAS FACFAOM  Podiatric Foot & Ankle Surgeon  [facility]  [phone]    Disclaimer: This note consists of symbols derived from keyboarding, dictation and/or voice recognition software. As a result, there may be errors in the script that have gone undetected. Please consider this when interpreting information found in  this chart.

## 2021-12-24 ENCOUNTER — OFFICE VISIT (OUTPATIENT)
Dept: FAMILY MEDICINE | Facility: CLINIC | Age: 76
End: 2021-12-24
Payer: COMMERCIAL

## 2021-12-24 VITALS
DIASTOLIC BLOOD PRESSURE: 70 MMHG | BODY MASS INDEX: 26.83 KG/M2 | TEMPERATURE: 97.6 F | WEIGHT: 177 LBS | OXYGEN SATURATION: 95 % | HEART RATE: 67 BPM | HEIGHT: 68 IN | RESPIRATION RATE: 18 BRPM | SYSTOLIC BLOOD PRESSURE: 120 MMHG

## 2021-12-24 DIAGNOSIS — G47.33 OBSTRUCTIVE SLEEP APNEA SYNDROME: ICD-10-CM

## 2021-12-24 DIAGNOSIS — K22.70 BARRETT'S ESOPHAGUS WITHOUT DYSPLASIA: ICD-10-CM

## 2021-12-24 DIAGNOSIS — R73.03 PREDIABETES: ICD-10-CM

## 2021-12-24 DIAGNOSIS — K20.0 EE (EOSINOPHILIC ESOPHAGITIS): ICD-10-CM

## 2021-12-24 DIAGNOSIS — Z01.818 PREOP GENERAL PHYSICAL EXAM: Primary | ICD-10-CM

## 2021-12-24 DIAGNOSIS — I10 HYPERTENSION GOAL BP (BLOOD PRESSURE) < 140/90: ICD-10-CM

## 2021-12-24 DIAGNOSIS — M21.611 BUNION, RIGHT: ICD-10-CM

## 2021-12-24 DIAGNOSIS — E78.5 HYPERLIPIDEMIA LDL GOAL <130: ICD-10-CM

## 2021-12-24 DIAGNOSIS — J45.20 MILD INTERMITTENT ASTHMA WITHOUT COMPLICATION: ICD-10-CM

## 2021-12-24 DIAGNOSIS — Z51.81 MEDICATION MONITORING ENCOUNTER: ICD-10-CM

## 2021-12-24 LAB
ALBUMIN SERPL-MCNC: 3.9 G/DL (ref 3.4–5)
ALBUMIN UR-MCNC: NEGATIVE MG/DL
ALP SERPL-CCNC: 94 U/L (ref 40–150)
ALT SERPL W P-5'-P-CCNC: 58 U/L (ref 0–70)
ANION GAP SERPL CALCULATED.3IONS-SCNC: 6 MMOL/L (ref 3–14)
APPEARANCE UR: CLEAR
AST SERPL W P-5'-P-CCNC: 34 U/L (ref 0–45)
BILIRUB SERPL-MCNC: 0.5 MG/DL (ref 0.2–1.3)
BILIRUB UR QL STRIP: NEGATIVE
BUN SERPL-MCNC: 17 MG/DL (ref 7–30)
CALCIUM SERPL-MCNC: 8.8 MG/DL (ref 8.5–10.1)
CHLORIDE BLD-SCNC: 105 MMOL/L (ref 94–109)
CO2 SERPL-SCNC: 29 MMOL/L (ref 20–32)
COLOR UR AUTO: YELLOW
CREAT SERPL-MCNC: 1.26 MG/DL (ref 0.66–1.25)
ERYTHROCYTE [DISTWIDTH] IN BLOOD BY AUTOMATED COUNT: 12.7 % (ref 10–15)
GFR SERPL CREATININE-BSD FRML MDRD: 59 ML/MIN/1.73M2
GLUCOSE BLD-MCNC: 117 MG/DL (ref 70–99)
GLUCOSE UR STRIP-MCNC: NEGATIVE MG/DL
HCT VFR BLD AUTO: 43.5 % (ref 40–53)
HGB BLD-MCNC: 14.7 G/DL (ref 13.3–17.7)
HGB UR QL STRIP: NEGATIVE
KETONES UR STRIP-MCNC: NEGATIVE MG/DL
LEUKOCYTE ESTERASE UR QL STRIP: NEGATIVE
MCH RBC QN AUTO: 32 PG (ref 26.5–33)
MCHC RBC AUTO-ENTMCNC: 33.8 G/DL (ref 31.5–36.5)
MCV RBC AUTO: 95 FL (ref 78–100)
NITRATE UR QL: NEGATIVE
PH UR STRIP: 6 [PH] (ref 5–7)
PLATELET # BLD AUTO: 275 10E3/UL (ref 150–450)
POTASSIUM BLD-SCNC: 4 MMOL/L (ref 3.4–5.3)
PROT SERPL-MCNC: 6.8 G/DL (ref 6.8–8.8)
RBC # BLD AUTO: 4.6 10E6/UL (ref 4.4–5.9)
SODIUM SERPL-SCNC: 140 MMOL/L (ref 133–144)
SP GR UR STRIP: 1.02 (ref 1–1.03)
UROBILINOGEN UR STRIP-ACNC: 0.2 E.U./DL
WBC # BLD AUTO: 6.3 10E3/UL (ref 4–11)

## 2021-12-24 PROCEDURE — 85027 COMPLETE CBC AUTOMATED: CPT | Performed by: FAMILY MEDICINE

## 2021-12-24 PROCEDURE — 81003 URINALYSIS AUTO W/O SCOPE: CPT | Performed by: FAMILY MEDICINE

## 2021-12-24 PROCEDURE — 99214 OFFICE O/P EST MOD 30 MIN: CPT | Performed by: FAMILY MEDICINE

## 2021-12-24 PROCEDURE — 83036 HEMOGLOBIN GLYCOSYLATED A1C: CPT | Performed by: FAMILY MEDICINE

## 2021-12-24 PROCEDURE — 36415 COLL VENOUS BLD VENIPUNCTURE: CPT | Performed by: FAMILY MEDICINE

## 2021-12-24 PROCEDURE — 93000 ELECTROCARDIOGRAM COMPLETE: CPT | Performed by: FAMILY MEDICINE

## 2021-12-24 PROCEDURE — 80053 COMPREHEN METABOLIC PANEL: CPT | Performed by: FAMILY MEDICINE

## 2021-12-24 ASSESSMENT — MIFFLIN-ST. JEOR: SCORE: 1512.37

## 2021-12-24 NOTE — PROGRESS NOTES
[address] [address]  Phone: [phone]  Primary Provider: Messi Saldana  Pre-op Performing Provider: MESSI SALDANA    PREOPERATIVE EVALUATION:  Today's date: 12/24/2021    Alexander Alfred is a 76 year old male who presents for a preoperative evaluation.    Surgical Information:  Surgery/Procedure: RIGHT LAPIDUS BUNIONECTOMY  Surgery Location: [facility]  Surgeon: Dr Jeong  Surgery Date: 01/05/2022  Time of Surgery: 10:30 AM  Where patient plans to recover: At home with family  Fax number for surgical facility: Note does not need to be faxed, will be available electronically in Epic.    Type of Anesthesia Anticipated: General and block    Assessment & Plan     The proposed surgical procedure is considered INTERMEDIATE risk.      ICD-10-CM    1. Preop general physical exam  Z01.818 Comprehensive metabolic panel     CBC with platelets     UA reflex to Microscopic and Culture     EKG 12-lead complete w/read - Clinics     Comprehensive metabolic panel     CBC with platelets     UA reflex to Microscopic and Culture     Hemoglobin A1c   2. Bunion, right  M21.611    3. Obstructive sleep apnea syndrome  G47.33    4. Prediabetes  R73.03 Hemoglobin A1c   5. Hypertension goal BP (blood pressure) < 140/90  I10    6. Mild intermittent asthma without complication  J45.20    7. Hyperlipidemia LDL goal <130  E78.5    8. 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  K22.70    9. EE (eosinophilic esophagitis)  K20.0    10. Medication monitoring encounter  Z51.81      Possible Sleep Apnea: using CPAP    Risks and Recommendations:  The patient has the following additional risks and recommendations for perioperative complications:   - No identified additional risk factors other than previously addressed    Medication Instructions:  HOLD NSAID's, vitamins, supplementss 1 week prior   Stop losartan/hctz 1 day prior  Ok to take omeprazole  Ok for  albuterol/flonase    RECOMMENDATION:  APPROVAL GIVEN to proceed with proposed procedure, without further diagnostic evaluation.    Independent interpretation of a test performed by another physician/other qualified health care professional (not separately reported) - ECG    20 minutes spent on the date of the encounter doing chart review, history and exam, documentation and further activities per the note    Subjective     HPI related to upcoming procedure:     Rt foot - bunion - increasing size and pain    Preop Questions 12/24/2021   1. Have you ever had a heart attack or stroke? No   2. Have you ever had surgery on your heart or blood vessels, such as a stent placement, a coronary artery bypass, or surgery on an artery in your head, neck, heart, or legs? No   3. Do you have chest pain with activity? No   4. Do you have a history of  heart failure? No   5. Do you currently have a cold, bronchitis or symptoms of other infection? No   6. Do you have a cough, shortness of breath, or wheezing? No   7. Do you or anyone in your family have previous history of blood clots? No   8. Do you or does anyone in your family have a serious bleeding problem such as prolonged bleeding following surgeries or cuts? No   9. Have you ever had problems with anemia or been told to take iron pills? No   10. Have you had any abnormal blood loss such as black, tarry or bloody stools? No   11. Have you ever had a blood transfusion? No   12. Are you willing to have a blood transfusion if it is medically needed before, during, or after your surgery? Yes   13. Have you or any of your relatives ever had problems with anesthesia? No   14. Do you have sleep apnea, excessive snoring or daytime drowsiness? YES - using CPAP   14a. Do you have a CPAP machine? Yes   15. Do you have any artifical heart valves or other implanted medical devices like a pacemaker, defibrillator, or continuous glucose monitor? No   16. Do you have artificial joints? No    17. Are you allergic to latex? No       Health Care Directive:  Patient does not have a Health Care Directive or Living Will: none    Preoperative Review of :   reviewed - no record of controlled substances prescribed.    AMBER - using CPAP    Prediabetes    Glucose   Date Value Ref Range Status   08/16/2021 97 70 - 99 mg/dL Final   09/09/2020 93 70 - 99 mg/dL Final     Comment:     Fasting specimen   08/08/2019 115 (H) 70 - 99 mg/dL Final     Comment:     Fasting specimen   08/03/2018 95 70 - 99 mg/dL Final     Comment:     Fasting specimen   07/17/2017 94 70 - 99 mg/dL Final   10/11/2016 119 (H) 70 - 99 mg/dL Final     Lab Results   Component Value Date    A1C 5.2 08/16/2021    A1C 5.3 09/09/2020    A1C 5.3 06/15/2015     Htn    BP Readings from Last 3 Encounters:   12/24/21 120/70   12/13/21 130/70   11/11/21 126/66     Creatinine   Date Value Ref Range Status   08/16/2021 1.12 0.66 - 1.25 mg/dL Final   09/09/2020 1.15 0.66 - 1.25 mg/dL Final     GFR Estimate   Date Value Ref Range Status   08/16/2021 64 >60 mL/min/1.73m2 Final     Comment:     As of July 11, 2021, eGFR is calculated by the CKD-EPI creatinine equation, without race adjustment. eGFR can be influenced by muscle mass, exercise, and diet. The reported eGFR is an estimation only and is only applicable if the renal function is stable.   09/09/2020 62 >60 mL/min/[1.73_m2] Final     Comment:     Non  GFR Calc  Starting 12/18/2018, serum creatinine based estimated GFR (eGFR) will be   calculated using the Chronic Kidney Disease Epidemiology Collaboration   (CKD-EPI) equation.       Asthma - ACT = 25    Lipids    Recent Labs   Lab Test 08/16/21  0919 09/09/20  0845 08/22/16  0939 06/15/15  1002   CHOL 159 161   < > 191   HDL 55 58   < > 58   LDL 82 79   < > 105   TRIG 111 120   < > 140   CHOLHDLRATIO  --   --   --  3.3    < > = values in this interval not displayed.     GERD/Verde's/EE    Doing very well on omeprazole    Review of  Systems  CONSTITUTIONAL: NEGATIVE for fever, chills, change in weight  INTEGUMENTARY/SKIN: NEGATIVE for worrisome rashes, moles or lesions  EYES: NEGATIVE for vision changes or irritation  ENT/MOUTH: NEGATIVE for ear, mouth and throat problems  RESP: NEGATIVE for significant cough or SOB  CV: NEGATIVE for chest pain, palpitations or peripheral edema  GI: NEGATIVE for nausea, abdominal pain, heartburn, or change in bowel habits  : NEGATIVE for frequency, dysuria, or hematuria  MUSCULOSKELETAL: NEGATIVE for significant arthralgias or myalgia  NEURO: NEGATIVE for weakness, dizziness or paresthesias  ENDOCRINE: NEGATIVE for temperature intolerance, skin/hair changes  HEME: NEGATIVE for bleeding problems  PSYCHIATRIC: NEGATIVE for changes in mood or affect    Patient Active Problem List    Diagnosis Date Noted     EE (eosinophilic esophagitis)      Priority: High     Dr Ramirez       Hypertension goal BP (blood pressure) < 140/90      Priority: High     Hyperlipidemia LDL goal <130      Priority: High     Sleep apnea      Priority: High     moderate       Asthma, mild intermittent      Priority: High     Park nicollet asthma       Verde's esophagus      Priority: Medium     Abnormal joint on examination 11/20/2019     Priority: Medium     Lumbar stenosis      Priority: Medium     Dr Knight - Calf weakness/atrophy       HNP (herniated nucleus pulposus), thoracic 10/25/2016     Priority: Medium     Advanced directives, counseling/discussion 08/22/2016     Priority: Medium     Advance Care Planning 8/22/2016: ACP Review of Chart / Resources Provided:  Reviewed chart for advance care plan.  Alexander CHRIS Alfred has been provided information and resources to begin or update their advance care plan.  Added by Hali Pérez           Left ankle pain 04/13/2016     Priority: Medium     Ankle fracture 04/13/2016     Priority: Medium     Closed traumatic nondisplaced fracture of distal end of left fibula 02/26/2016     Priority:  Medium     Acquired spondylolisthesis 10/08/2014     Priority: Medium     Spinal stenosis of lumbar region at multiple levels 10/07/2014     Priority: Medium     Spondylolisthesis of lumbar region 06/18/2014     Priority: Medium     Lumbar foraminal stenosis 06/18/2014     Priority: Medium     Central spinal stenosis 06/18/2014     Priority: Medium     Left lumbosacral radiculopathy 06/10/2014     Priority: Medium     Lumbar pain 04/02/2014     Priority: Medium     Lumbar facet arthropathy 03/29/2014     Priority: Medium     Lumbar degenerative disc disease 03/29/2014     Priority: Medium     Osteoarthritis of left hip 02/24/2014     Priority: Medium     Carpal tunnel syndrome 09/04/2013     Priority: Medium     Memory loss 09/04/2013     Priority: Medium     Nasal polyps      Priority: Medium     IMO update changed this record. Please review for accuracy       Ostium secundum type atrial septal defect 05/26/2004     Priority: Medium     Overview:   LW Onset:  26May04  ; Patent Foramen Ovale       Allergic rhinitis 06/07/2003     Priority: Medium     Overview:   Rhinitis Allergic  NOS        Past Medical History:   Diagnosis Date     Asthma, mild intermittent     Park nicollet asthma     Verde's esophagus 06/2020    Dr Ramirez, EGD due 1 yr     EE (eosinophilic esophagitis) 08/2013    Dr Ramirez     Hyperlipidemia LDL goal <130      Hypertension goal BP (blood pressure) < 140/90      Lumbar stenosis     Dr Knight - Calf weakness/atrophy     Nasal polyps 2004    approx 2/year - increased sx from nasal polyps - prednisone 40 mg x 2 days, 20 mg x 2 days, 10mg x 10 days with excellent results     Other chronic pain      Sensorineural hearing loss (SNHL) of both ears     Dr Hidalgo - R>L     Sleep apnea 2006    moderate - CPAP - 5-15 cm     Past Surgical History:   Procedure Laterality Date     COLONOSCOPY  2010    normal - due 2020     COLONOSCOPY N/A 10/21/2020    normal, consider 10 yrs     DECOMPRESSION, FUSION  LUMBAR POSTERIOR THREE + LEVELS, COMBINED N/A 10/07/2014    Dr Knight - COMBINED DECOMPRESSION, FUSION LUMBAR POSTERIOR THREE + LEVELS     ESOPHAGOSCOPY, GASTROSCOPY, DUODENOSCOPY (EGD), COMBINED  08/07/2013    Dr Ramirez - EE, non erosive gastropathy     ESOPHAGOSCOPY, GASTROSCOPY, DUODENOSCOPY (EGD), COMBINED N/A 06/02/2021    Verde's due 1 yr     ROTATOR CUFF REPAIR RT/LT Left 2005    Lt shoulder - dr armaan lazar     ROTATOR CUFF REPAIR RT/LT Left 05/2009    Lt shoulder - dr armaan lazar     stress echo  07/2011    normal     STRESS ECHO (METRO)  06/2015    normal stress echo     SURGICAL HISTORY OF -   1982    Lt ankle CRIF     SURGICAL HISTORY OF -   04/2008    blephoplasty     SURGICAL HISTORY OF -   11/2016    T12-L1 - Decompression - Dr Knight -      TONSILLECTOMY  1950     Current Outpatient Medications   Medication Sig Dispense Refill     albuterol (PROAIR HFA/PROVENTIL HFA/VENTOLIN HFA) 108 (90 Base) MCG/ACT inhaler Inhale 2 puffs into the lungs every 6 hours as needed for shortness of breath / dyspnea or wheezing 18 g 3     aspirin 81 MG chewable tablet Take 1 tablet (81 mg) by mouth daily 108 tablet 3     Coenzyme Q10 (COQ-10) 100 MG CAPS Take 100 mg by mouth daily       fluticasone (FLONASE) 50 MCG/ACT nasal spray Spray 2 sprays into both nostrils daily 48 g 3     KRILL OIL PO Take 1 capsule by mouth daily Taking MegaRed OTC. Strength unknown.       losartan-hydrochlorothiazide (HYZAAR) 50-12.5 MG tablet Take 1 tablet by mouth daily 90 tablet 3     Multiple Vitamin (MULTIVITAMIN OR) Take 1 tablet by mouth daily        omeprazole (PRILOSEC) 10 MG DR capsule Take 20 mg by mouth daily       Probiotic Product (4X PROBIOTIC) TABS          Allergies   Allergen Reactions     Codeine GI Disturbance     Hydrocodone GI Disturbance     Oxycodone GI Disturbance        Social History     Tobacco Use     Smoking status: Never Smoker     Smokeless tobacco: Never Used   Substance Use Topics     Alcohol use: Yes  "    Alcohol/week: 11.7 standard drinks     Types: 14 Standard drinks or equivalent per week     Comment: 2 drinks per day avg     Family History   Problem Relation Age of Onset     Hypertension Father      C.A.D. Father         smoker     SHAYLA.A.ZOEY. Cousin         cousin - at 45     Hypertension Maternal Grandfather      SURINDERA.ZOEY. Maternal Grandfather      Coronary Artery Disease Maternal Uncle      Coronary Artery Disease Maternal Uncle      Colon Cancer No family hx of      History   Drug Use No         Objective     /70   Pulse 67   Temp 97.6  F (36.4  C) (Tympanic)   Resp 18   Ht 1.727 m (5' 8\")   Wt 80.3 kg (177 lb)   SpO2 95%   BMI 26.91 kg/m      Physical Exam    GENERAL APPEARANCE: healthy, alert and no distress     EYES: EOMI,  PERRL     HENT: ear canals and TM's normal and nose and mouth without ulcers or lesions     NECK: no adenopathy, no asymmetry, masses, or scars and thyroid normal to palpation     RESP: lungs clear to auscultation - no rales, rhonchi or wheezes     CV: regular rates and rhythm, normal S1 S2, no S3 or S4 and no murmur, click or rub     ABDOMEN:  soft, nontender, no HSM or masses and bowel sounds normal     MS: extremities normal- no gross deformities noted, no evidence of inflammation in joints, FROM in all extremities.     SKIN: no suspicious lesions or rashes     NEURO: Normal strength and tone, sensory exam grossly normal, mentation intact and speech normal     PSYCH: mentation appears normal. and affect normal/bright     LYMPHATICS: No cervical adenopathy    Recent Labs   Lab Test 08/16/21  0919 09/09/20  0845   HGB 13.5 14.3    250    138   POTASSIUM 3.9 3.8   CR 1.12 1.15   A1C 5.2 5.3        Diagnostics:  Labs pending at this time.  Results will be reviewed when available.   EKG: appears normal, NSR, normal axis, normal intervals, no acute ST/T changes c/w ischemia, no LVH by voltage criteria    Revised Cardiac Risk Index (RCRI):  The patient has the " following serious cardiovascular risks for perioperative complications:   - No serious cardiac risks = 0 points     RCRI Interpretation: 0 points: Class I (very low risk - 0.4% complication rate)    Signed Electronically by:            Messi Saldana MD, FAAFP     [facility]  [address] [place] [id number]  [email or web address]   Office: [phone]  Fax: [phone]         Copy of this evaluation report is provided to requesting physician.

## 2021-12-24 NOTE — LETTER
December 27, 2021      Alexander Alfred  [id number]  [place] [address]        Dear ,    We are writing to inform you of your test results.    They showed:     Labs are quite good, except     Mildly increased glucose (probably non fasting)   Minimally decreased renal function     We advise:     Please proceed with surgery   Recheck fasting labs (a1c, bmp)     For additional lab test information, [email or web address] is an excellent reference.       Resulted Orders   Comprehensive metabolic panel   Result Value Ref Range    Sodium 140 133 - 144 mmol/L    Potassium 4.0 3.4 - 5.3 mmol/L    Chloride 105 94 - 109 mmol/L    Carbon Dioxide (CO2) 29 20 - 32 mmol/L    Anion Gap 6 3 - 14 mmol/L    Urea Nitrogen 17 7 - 30 mg/dL    Creatinine 1.26 (H) 0.66 - 1.25 mg/dL    Calcium 8.8 8.5 - 10.1 mg/dL    Glucose 117 (H) 70 - 99 mg/dL    Alkaline Phosphatase 94 40 - 150 U/L    AST 34 0 - 45 U/L    ALT 58 0 - 70 U/L    Protein Total 6.8 6.8 - 8.8 g/dL    Albumin 3.9 3.4 - 5.0 g/dL    Bilirubin Total 0.5 0.2 - 1.3 mg/dL    GFR Estimate 59 (L) >60 mL/min/1.73m2      Comment:      Effective December 21, 2021 eGFRcr in adults is calculated using the 2021 CKD-EPI creatinine equation which includes age and gender (Paulie montano al., NEJ, DOI: 10.1056/YRKHof6767231)   CBC with platelets   Result Value Ref Range    WBC Count 6.3 4.0 - 11.0 10e3/uL    RBC Count 4.60 4.40 - 5.90 10e6/uL    Hemoglobin 14.7 13.3 - 17.7 g/dL    Hematocrit 43.5 40.0 - 53.0 %    MCV 95 78 - 100 fL    MCH 32.0 26.5 - 33.0 pg    MCHC 33.8 31.5 - 36.5 g/dL    RDW 12.7 10.0 - 15.0 %    Platelet Count 275 150 - 450 10e3/uL   UA reflex to Microscopic and Culture   Result Value Ref Range    Color Urine Yellow Colorless, Straw, Light Yellow, Yellow    Appearance Urine Clear Clear    Glucose Urine Negative Negative mg/dL    Bilirubin Urine Negative Negative    Ketones Urine Negative Negative mg/dL    Specific Gravity Urine 1.020 1.003 - 1.035    Blood Urine Negative  Negative    pH Urine 6.0 5.0 - 7.0    Protein Albumin Urine Negative Negative mg/dL    Urobilinogen Urine 0.2 0.2, 1.0 E.U./dL    Nitrite Urine Negative Negative    Leukocyte Esterase Urine Negative Negative    Narrative    Microscopic not indicated       If you have any questions or concerns, please call the clinic at the number listed above.       Sincerely,      Messi Saldana MD

## 2021-12-27 LAB — HBA1C MFR BLD: 5.5 % (ref 0–5.6)

## 2021-12-29 ASSESSMENT — ASTHMA QUESTIONNAIRES: ACT_TOTALSCORE: 25

## 2022-01-03 ENCOUNTER — TELEPHONE (OUTPATIENT)
Dept: PODIATRY | Facility: CLINIC | Age: 77
End: 2022-01-03

## 2022-01-03 NOTE — TELEPHONE ENCOUNTER
Patient calls stating he has surgery scheduled on 1/5/22 with Dr. Jeong. He was to have a COVID19 test today and he was told it was cancelled and he has not been notified.   Let him know our surgery scheduler tried to reach him. Offered to have him hold or for her to call him back. He asks for a call back. Ok to leave message : YES.   Surgery scheduling informed and will contact patient.     YULIANA Lyel RN

## 2022-01-05 ENCOUNTER — TRANSFERRED RECORDS (OUTPATIENT)
Dept: HEALTH INFORMATION MANAGEMENT | Facility: CLINIC | Age: 77
End: 2022-01-05
Payer: COMMERCIAL

## 2022-01-12 ENCOUNTER — TELEPHONE (OUTPATIENT)
Dept: PODIATRY | Facility: CLINIC | Age: 77
End: 2022-01-12
Payer: COMMERCIAL

## 2022-01-12 NOTE — TELEPHONE ENCOUNTER
Delayed documentation    Surgery rescheduled due to surgeon being out ill.   Original surgery date 1/5/22    Type of surgery: right lapidus bunionectomy  Location of surgery: [facility]  Date and time of surgery: 2/16/22  Surgeon: Adenike  Pre-Op Appt Date: 2/1/22  Post-Op Appt Date: 2/24/22   Packet sent out: Yes  Pre-cert/Authorization completed:  No  Date: 1/12/22      Drew Guzman Surgery Scheduler

## 2022-02-01 ENCOUNTER — OFFICE VISIT (OUTPATIENT)
Dept: FAMILY MEDICINE | Facility: CLINIC | Age: 77
End: 2022-02-01
Payer: COMMERCIAL

## 2022-02-01 VITALS
TEMPERATURE: 97.8 F | OXYGEN SATURATION: 97 % | RESPIRATION RATE: 12 BRPM | SYSTOLIC BLOOD PRESSURE: 126 MMHG | WEIGHT: 186 LBS | HEART RATE: 47 BPM | BODY MASS INDEX: 28.19 KG/M2 | DIASTOLIC BLOOD PRESSURE: 70 MMHG | HEIGHT: 68 IN

## 2022-02-01 DIAGNOSIS — I10 HYPERTENSION GOAL BP (BLOOD PRESSURE) < 140/90: ICD-10-CM

## 2022-02-01 DIAGNOSIS — E78.5 HYPERLIPIDEMIA LDL GOAL <130: ICD-10-CM

## 2022-02-01 DIAGNOSIS — G47.33 OBSTRUCTIVE SLEEP APNEA SYNDROME: ICD-10-CM

## 2022-02-01 DIAGNOSIS — R73.03 PREDIABETES: ICD-10-CM

## 2022-02-01 DIAGNOSIS — Z01.818 PREOP GENERAL PHYSICAL EXAM: Primary | ICD-10-CM

## 2022-02-01 DIAGNOSIS — K22.70 BARRETT'S ESOPHAGUS WITHOUT DYSPLASIA: ICD-10-CM

## 2022-02-01 DIAGNOSIS — M21.611 BUNION, RIGHT: ICD-10-CM

## 2022-02-01 PROCEDURE — 99214 OFFICE O/P EST MOD 30 MIN: CPT | Performed by: FAMILY MEDICINE

## 2022-02-01 ASSESSMENT — PAIN SCALES - GENERAL: PAINLEVEL: NO PAIN (0)

## 2022-02-01 ASSESSMENT — MIFFLIN-ST. JEOR: SCORE: 1553.19

## 2022-02-01 NOTE — PROGRESS NOTES
[address] [address]  Phone: [phone]  Primary Provider: Messi Saldana  Pre-op Performing Provider: FRITZ FRANKEL      PREOPERATIVE EVALUATION:  Today's date: 2/1/2022    Alexander Alfred is a 76 year old male who presents for a preoperative evaluation.    Surgical Information:  Surgery/Procedure: Lapidus bunionectomy, right foot  Surgery Location: [facility]  Surgeon: Dr. Jeong  Surgery Date: 2/16/2022  Time of Surgery: 730 am  Where patient plans to recover: At home with family  Fax number for surgical facility: Note does not need to be faxed, will be available electronically in Epic.  Last EKG completed on 12/24/21  Type of Anesthesia Anticipated: Combined General with Block    Assessment & Plan     The proposed surgical procedure is considered INTERMEDIATE risk.    Preop general physical exam    Bunion, right    Obstructive sleep apnea syndrome    Prediabetes    Hypertension goal BP (blood pressure) < 140/90    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    Hyperlipidemia LDL goal <130      Risks and Recommendations:  The patient has the following additional risks and recommendations for perioperative complications:   - No identified additional risk factors other than previously addressed    Medication Instructions:   - Losartan-HCTZ: HOLD on the day of surgery.  -Hold ASA, other vitamins and supplements 1 week before surgery.    RECOMMENDATION:  APPROVAL GIVEN to proceed with proposed procedure, without further diagnostic evaluation.          Subjective     HPI related to upcoming procedure: history of right bunion - growing in size and painful    Preop Questions 1/25/2022   1. Have you ever had a heart attack or stroke? No   2. Have you ever had surgery on your heart or blood vessels, such as a stent placement, a coronary artery bypass, or surgery on an artery in your head, neck, heart, or legs? No   3. Do you have chest  pain with activity? No   4. Do you have a history of  heart failure? No   5. Do you currently have a cold, bronchitis or symptoms of other infection? No   6. Do you have a cough, shortness of breath, or wheezing? No   7. Do you or anyone in your family have previous history of blood clots? No   8. Do you or does anyone in your family have a serious bleeding problem such as prolonged bleeding following surgeries or cuts? No   9. Have you ever had problems with anemia or been told to take iron pills? No   10. Have you had any abnormal blood loss such as black, tarry or bloody stools? No   11. Have you ever had a blood transfusion? No   12. Are you willing to have a blood transfusion if it is medically needed before, during, or after your surgery? Yes   13. Have you or any of your relatives ever had problems with anesthesia? No   14. Do you have sleep apnea, excessive snoring or daytime drowsiness? YES   14a. Do you have a CPAP machine? Yes   15. Do you have any artifical heart valves or other implanted medical devices like a pacemaker, defibrillator, or continuous glucose monitor? No   16. Do you have artificial joints? No   17. Are you allergic to latex? No       Health Care Directive:  Patient does not have a Health Care Directive or Living Will: Advance Directive received and scanned. Click on Code in the patient header to view.    Preoperative Review of :   reviewed - no record of controlled substances prescribed.    Status of Chronic Conditions:  See problem list for active medical problems.  Problems all longstanding and stable, except as noted/documented.  See ROS for pertinent symptoms related to these conditions.      Review of Systems  CONSTITUTIONAL: NEGATIVE for fever, chills, change in weight  INTEGUMENTARY/SKIN: NEGATIVE for worrisome rashes, moles or lesions  EYES: NEGATIVE for vision changes or irritation  ENT/MOUTH: NEGATIVE for ear, mouth and throat problems  RESP: NEGATIVE for significant  cough or SOB  CV: NEGATIVE for chest pain, palpitations or peripheral edema  GI: NEGATIVE for nausea, abdominal pain, heartburn, or change in bowel habits  : NEGATIVE for frequency, dysuria, or hematuria  MUSCULOSKELETAL: NEGATIVE for significant arthralgias or myalgia  NEURO: NEGATIVE for weakness, dizziness or paresthesias  ENDOCRINE: NEGATIVE for temperature intolerance, skin/hair changes  HEME: NEGATIVE for bleeding problems  PSYCHIATRIC: NEGATIVE for changes in mood or affect    Patient Active Problem List    Diagnosis Date Noted     EE (eosinophilic esophagitis)      Priority: High     Dr Ramirez       Hypertension goal BP (blood pressure) < 140/90      Priority: High     Hyperlipidemia LDL goal <130      Priority: High     Asthma, mild intermittent      Priority: High     Park nicollet asthma       Verde's esophagus      Priority: Medium     Abnormal joint on examination 11/20/2019     Priority: Medium     Lumbar stenosis      Priority: Medium     Dr Knight - Calf weakness/atrophy       HNP (herniated nucleus pulposus), thoracic 10/25/2016     Priority: Medium     Advanced directives, counseling/discussion 08/22/2016     Priority: Medium     Advance Care Planning 8/22/2016: ACP Review of Chart / Resources Provided:  Reviewed chart for advance care plan.  Alexander Alfred has been provided information and resources to begin or update their advance care plan.  Added by Hali Pérez           Left ankle pain 04/13/2016     Priority: Medium     Ankle fracture 04/13/2016     Priority: Medium     Closed traumatic nondisplaced fracture of distal end of left fibula 02/26/2016     Priority: Medium     Acquired spondylolisthesis 10/08/2014     Priority: Medium     Spinal stenosis of lumbar region at multiple levels 10/07/2014     Priority: Medium     Spondylolisthesis of lumbar region 06/18/2014     Priority: Medium     Lumbar foraminal stenosis 06/18/2014     Priority: Medium     Central spinal stenosis  06/18/2014     Priority: Medium     Left lumbosacral radiculopathy 06/10/2014     Priority: Medium     Lumbar pain 04/02/2014     Priority: Medium     Lumbar facet arthropathy 03/29/2014     Priority: Medium     Lumbar degenerative disc disease 03/29/2014     Priority: Medium     Osteoarthritis of left hip 02/24/2014     Priority: Medium     Carpal tunnel syndrome 09/04/2013     Priority: Medium     Memory loss 09/04/2013     Priority: Medium     Sleep apnea      Priority: Medium     moderate       Nasal polyps      Priority: Medium     IMO update changed this record. Please review for accuracy       Ostium secundum type atrial septal defect 05/26/2004     Priority: Medium     Overview:   LW Onset:  26May04  ; Patent Foramen Ovale       Allergic rhinitis 06/07/2003     Priority: Medium     Overview:   Rhinitis Allergic  NOS        Past Medical History:   Diagnosis Date     Asthma, mild intermittent     Park nicollet asthma     Verde's esophagus 06/2020    Dr Ramirez, EGD due 1 yr     EE (eosinophilic esophagitis) 08/2013    Dr Ramirez     Hyperlipidemia LDL goal <130      Hypertension goal BP (blood pressure) < 140/90      Lumbar stenosis     Dr Knight - Calf weakness/atrophy     Nasal polyps 2004    approx 2/year - increased sx from nasal polyps - prednisone 40 mg x 2 days, 20 mg x 2 days, 10mg x 10 days with excellent results     Other chronic pain      Sensorineural hearing loss (SNHL) of both ears     Dr Hidalgo - R>L     Sleep apnea 2006    moderate - CPAP - 5-15 cm     Past Surgical History:   Procedure Laterality Date     COLONOSCOPY  2010    normal - due 2020     COLONOSCOPY N/A 10/21/2020    normal, consider 10 yrs     DECOMPRESSION, FUSION LUMBAR POSTERIOR THREE + LEVELS, COMBINED N/A 10/07/2014    Dr Knight - COMBINED DECOMPRESSION, FUSION LUMBAR POSTERIOR THREE + LEVELS     ESOPHAGOSCOPY, GASTROSCOPY, DUODENOSCOPY (EGD), COMBINED  08/07/2013    Dr Ramirez - EE, non erosive gastropathy      ESOPHAGOSCOPY, GASTROSCOPY, DUODENOSCOPY (EGD), COMBINED N/A 06/02/2021    Verde's due 1 yr     ROTATOR CUFF REPAIR RT/LT Left 2005    Lt shoulder - dr armaan lazar     ROTATOR CUFF REPAIR RT/LT Left 05/2009    Lt shoulder - dr armaan lazar     stress echo  07/2011    normal     STRESS ECHO ([facility])  06/2015    normal stress echo     SURGICAL HISTORY OF -   1982    Lt ankle CRIF     SURGICAL HISTORY OF -   04/2008    blephoplasty     SURGICAL HISTORY OF -   11/2016    T12-L1 - Decompression - Dr Knight -      TONSILLECTOMY  1950     Current Outpatient Medications   Medication Sig Dispense Refill     albuterol (PROAIR HFA/PROVENTIL HFA/VENTOLIN HFA) 108 (90 Base) MCG/ACT inhaler Inhale 2 puffs into the lungs every 6 hours as needed for shortness of breath / dyspnea or wheezing 18 g 3     aspirin 81 MG chewable tablet Take 1 tablet (81 mg) by mouth daily 108 tablet 3     Coenzyme Q10 (COQ-10) 100 MG CAPS Take 100 mg by mouth daily       fluticasone (FLONASE) 50 MCG/ACT nasal spray Spray 2 sprays into both nostrils daily 48 g 3     KRILL OIL PO Take 1 capsule by mouth daily Taking MegaRed OTC. Strength unknown.       losartan-hydrochlorothiazide (HYZAAR) 50-12.5 MG tablet Take 1 tablet by mouth daily 90 tablet 3     Multiple Vitamin (MULTIVITAMIN OR) Take 1 tablet by mouth daily        omeprazole (PRILOSEC) 20 MG DR capsule Take 20 mg by mouth 2 times daily       Probiotic Product (4X PROBIOTIC) TABS          Allergies   Allergen Reactions     Codeine GI Disturbance     Hydrocodone GI Disturbance     Oxycodone GI Disturbance        Social History     Tobacco Use     Smoking status: Never Smoker     Smokeless tobacco: Never Used   Substance Use Topics     Alcohol use: Yes     Alcohol/week: 11.7 standard drinks     Comment: 2 drinks per day avg     Family History   Problem Relation Age of Onset     Hypertension Father      C.A.D. Father         smoker     C.A.D. Cousin         cousin - at 45     Hypertension Maternal  "Grandfather      C.A.D. Maternal Grandfather      Coronary Artery Disease Maternal Uncle      Coronary Artery Disease Maternal Uncle      Colon Cancer No family hx of      History   Drug Use No         Objective     /72   Pulse (!) 47   Temp 97.8  F (36.6  C) (Tympanic)   Resp 12   Ht 1.727 m (5' 8\")   Wt 84.4 kg (186 lb)   SpO2 97%   BMI 28.28 kg/m      Physical Exam    GENERAL APPEARANCE: healthy, alert and no distress     EYES: EOMI,  PERRL     HENT: ear canals and TM's normal and nose and mouth without ulcers or lesions     NECK: no adenopathy, no asymmetry, masses, or scars and thyroid normal to palpation     RESP: lungs clear to auscultation - no rales, rhonchi or wheezes     CV: regular rates and rhythm, normal S1 S2, no S3 or S4 and no murmur, click or rub     MS: extremities normal- no gross deformities noted, no evidence of inflammation in joints, FROM in all extremities.     SKIN: no suspicious lesions or rashes     NEURO: Normal strength and tone, sensory exam grossly normal, mentation intact and speech normal     PSYCH: mentation appears normal. and affect normal/bright     LYMPHATICS: No cervical adenopathy    Recent Labs   Lab Test 12/24/21  1020 08/16/21  0919   HGB 14.7 13.5    259    141   POTASSIUM 4.0 3.9   CR 1.26* 1.12   A1C 5.5 5.2        Diagnostics:  No EKG this visit, completed in the last 90 days.    Revised Cardiac Risk Index (RCRI):  The patient has the following serious cardiovascular risks for perioperative complications:   - No serious cardiac risks = 0 points     RCRI Interpretation: 0 points: Class I (very low risk - 0.4% complication rate)           Signed Electronically by: Les Abdullahi DO  Copy of this evaluation report is provided to requesting physician.    "

## 2022-02-02 DIAGNOSIS — Z11.59 ENCOUNTER FOR SCREENING FOR OTHER VIRAL DISEASES: Primary | ICD-10-CM

## 2022-02-14 ENCOUNTER — LAB (OUTPATIENT)
Dept: LAB | Facility: CLINIC | Age: 77
End: 2022-02-14
Payer: COMMERCIAL

## 2022-02-14 DIAGNOSIS — Z11.59 ENCOUNTER FOR SCREENING FOR OTHER VIRAL DISEASES: ICD-10-CM

## 2022-02-14 PROCEDURE — U0005 INFEC AGEN DETEC AMPLI PROBE: HCPCS

## 2022-02-14 PROCEDURE — U0003 INFECTIOUS AGENT DETECTION BY NUCLEIC ACID (DNA OR RNA); SEVERE ACUTE RESPIRATORY SYNDROME CORONAVIRUS 2 (SARS-COV-2) (CORONAVIRUS DISEASE [COVID-19]), AMPLIFIED PROBE TECHNIQUE, MAKING USE OF HIGH THROUGHPUT TECHNOLOGIES AS DESCRIBED BY CMS-2020-01-R: HCPCS

## 2022-02-15 LAB — SARS-COV-2 RNA RESP QL NAA+PROBE: NEGATIVE

## 2022-02-16 ENCOUNTER — ANESTHESIA (OUTPATIENT)
Dept: SURGERY | Facility: CLINIC | Age: 77
End: 2022-02-16
Payer: COMMERCIAL

## 2022-02-16 ENCOUNTER — ANESTHESIA EVENT (OUTPATIENT)
Dept: SURGERY | Facility: CLINIC | Age: 77
End: 2022-02-16
Payer: COMMERCIAL

## 2022-02-16 ENCOUNTER — HOSPITAL ENCOUNTER (OUTPATIENT)
Facility: CLINIC | Age: 77
Discharge: HOME | End: 2022-02-16
Attending: PODIATRIST | Admitting: PODIATRIST
Payer: COMMERCIAL

## 2022-02-16 ENCOUNTER — APPOINTMENT (OUTPATIENT)
Dept: GENERAL RADIOLOGY | Facility: CLINIC | Age: 77
End: 2022-02-16
Attending: PODIATRIST
Payer: COMMERCIAL

## 2022-02-16 VITALS
BODY MASS INDEX: 29.19 KG/M2 | TEMPERATURE: 97.8 F | RESPIRATION RATE: 16 BRPM | HEART RATE: 78 BPM | HEIGHT: 67 IN | SYSTOLIC BLOOD PRESSURE: 143 MMHG | DIASTOLIC BLOOD PRESSURE: 67 MMHG | WEIGHT: 186 LBS | OXYGEN SATURATION: 95 %

## 2022-02-16 DIAGNOSIS — M21.611 BUNION, RIGHT: ICD-10-CM

## 2022-02-16 DIAGNOSIS — Z98.890 S/P FOOT SURGERY, RIGHT: Primary | ICD-10-CM

## 2022-02-16 PROCEDURE — 250N000009 HC RX 250: Performed by: NURSE ANESTHETIST, CERTIFIED REGISTERED

## 2022-02-16 PROCEDURE — C1713 ANCHOR/SCREW BN/BN,TIS/BN: HCPCS | Performed by: PODIATRIST

## 2022-02-16 PROCEDURE — 258N000003 HC RX IP 258 OP 636: Performed by: NURSE ANESTHETIST, CERTIFIED REGISTERED

## 2022-02-16 PROCEDURE — 250N000011 HC RX IP 250 OP 636: Performed by: PODIATRIST

## 2022-02-16 PROCEDURE — 272N000002 HC OR SUPPLY OTHER OPNP: Performed by: PODIATRIST

## 2022-02-16 PROCEDURE — 370N000017 HC ANESTHESIA TECHNICAL FEE, PER MIN: Performed by: PODIATRIST

## 2022-02-16 PROCEDURE — 710N000009 HC RECOVERY PHASE 1, LEVEL 1, PER MIN: Performed by: PODIATRIST

## 2022-02-16 PROCEDURE — 250N000011 HC RX IP 250 OP 636: Performed by: NURSE ANESTHETIST, CERTIFIED REGISTERED

## 2022-02-16 PROCEDURE — 272N000001 HC OR GENERAL SUPPLY STERILE: Performed by: PODIATRIST

## 2022-02-16 PROCEDURE — 999N000179 XR SURGERY CARM FLUORO LESS THAN 5 MIN W STILLS: Mod: TC

## 2022-02-16 PROCEDURE — 710N000012 HC RECOVERY PHASE 2, PER MINUTE: Performed by: PODIATRIST

## 2022-02-16 PROCEDURE — 360N000082 HC SURGERY LEVEL 2 W/ FLUORO, PER MIN: Performed by: PODIATRIST

## 2022-02-16 PROCEDURE — 28296 COR HLX VLGS DSTL MTAR OSTEO: CPT | Mod: RT | Performed by: PODIATRIST

## 2022-02-16 PROCEDURE — 999N000141 HC STATISTIC PRE-PROCEDURE NURSING ASSESSMENT: Performed by: PODIATRIST

## 2022-02-16 DEVICE — IMPLANTABLE DEVICE: Type: IMPLANTABLE DEVICE | Site: FOOT | Status: FUNCTIONAL

## 2022-02-16 RX ORDER — KETAMINE HYDROCHLORIDE 10 MG/ML
INJECTION INTRAMUSCULAR; INTRAVENOUS PRN
Status: DISCONTINUED | OUTPATIENT
Start: 2022-02-16 | End: 2022-02-16

## 2022-02-16 RX ORDER — IBUPROFEN 600 MG/1
TABLET, FILM COATED ORAL
Qty: 28 TABLET | Refills: 0 | Status: SHIPPED | OUTPATIENT
Start: 2022-02-16

## 2022-02-16 RX ORDER — CEFAZOLIN SODIUM/WATER 2 G/20 ML
2 SYRINGE (ML) INTRAVENOUS
Status: COMPLETED | OUTPATIENT
Start: 2022-02-16 | End: 2022-02-16

## 2022-02-16 RX ORDER — NALOXONE HYDROCHLORIDE 0.4 MG/ML
0.4 INJECTION, SOLUTION INTRAMUSCULAR; INTRAVENOUS; SUBCUTANEOUS
Status: DISCONTINUED | OUTPATIENT
Start: 2022-02-16 | End: 2022-02-16 | Stop reason: HOSPADM

## 2022-02-16 RX ORDER — FENTANYL CITRATE 50 UG/ML
25 INJECTION, SOLUTION INTRAMUSCULAR; INTRAVENOUS EVERY 5 MIN PRN
Status: DISCONTINUED | OUTPATIENT
Start: 2022-02-16 | End: 2022-02-16 | Stop reason: HOSPADM

## 2022-02-16 RX ORDER — CEFAZOLIN SODIUM/WATER 2 G/20 ML
2 SYRINGE (ML) INTRAVENOUS SEE ADMIN INSTRUCTIONS
Status: DISCONTINUED | OUTPATIENT
Start: 2022-02-16 | End: 2022-02-16 | Stop reason: HOSPADM

## 2022-02-16 RX ORDER — SODIUM CHLORIDE, SODIUM LACTATE, POTASSIUM CHLORIDE, CALCIUM CHLORIDE 600; 310; 30; 20 MG/100ML; MG/100ML; MG/100ML; MG/100ML
INJECTION, SOLUTION INTRAVENOUS CONTINUOUS PRN
Status: DISCONTINUED | OUTPATIENT
Start: 2022-02-16 | End: 2022-02-16

## 2022-02-16 RX ORDER — HYDROCODONE BITARTRATE AND ACETAMINOPHEN 5; 325 MG/1; MG/1
1 TABLET ORAL
Status: DISCONTINUED | OUTPATIENT
Start: 2022-02-16 | End: 2022-02-16 | Stop reason: HOSPADM

## 2022-02-16 RX ORDER — HYDROMORPHONE HCL IN WATER/PF 6 MG/30 ML
0.4 PATIENT CONTROLLED ANALGESIA SYRINGE INTRAVENOUS EVERY 5 MIN PRN
Status: DISCONTINUED | OUTPATIENT
Start: 2022-02-16 | End: 2022-02-16 | Stop reason: HOSPADM

## 2022-02-16 RX ORDER — HYDROCODONE BITARTRATE AND ACETAMINOPHEN 5; 325 MG/1; MG/1
1-2 TABLET ORAL EVERY 6 HOURS PRN
Qty: 15 TABLET | Refills: 0 | Status: SHIPPED | OUTPATIENT
Start: 2022-02-16 | End: 2022-09-26

## 2022-02-16 RX ORDER — FENTANYL CITRATE 50 UG/ML
INJECTION, SOLUTION INTRAMUSCULAR; INTRAVENOUS PRN
Status: DISCONTINUED | OUTPATIENT
Start: 2022-02-16 | End: 2022-02-16

## 2022-02-16 RX ORDER — NALOXONE HYDROCHLORIDE 0.4 MG/ML
0.2 INJECTION, SOLUTION INTRAMUSCULAR; INTRAVENOUS; SUBCUTANEOUS
Status: DISCONTINUED | OUTPATIENT
Start: 2022-02-16 | End: 2022-02-16 | Stop reason: HOSPADM

## 2022-02-16 RX ORDER — LIDOCAINE HYDROCHLORIDE 10 MG/ML
INJECTION, SOLUTION INFILTRATION; PERINEURAL PRN
Status: DISCONTINUED | OUTPATIENT
Start: 2022-02-16 | End: 2022-02-16

## 2022-02-16 RX ORDER — GLYCOPYRROLATE 0.2 MG/ML
INJECTION, SOLUTION INTRAMUSCULAR; INTRAVENOUS PRN
Status: DISCONTINUED | OUTPATIENT
Start: 2022-02-16 | End: 2022-02-16

## 2022-02-16 RX ORDER — PROPOFOL 10 MG/ML
INJECTION, EMULSION INTRAVENOUS PRN
Status: DISCONTINUED | OUTPATIENT
Start: 2022-02-16 | End: 2022-02-16

## 2022-02-16 RX ORDER — ONDANSETRON 4 MG/1
4 TABLET, ORALLY DISINTEGRATING ORAL EVERY 30 MIN PRN
Status: DISCONTINUED | OUTPATIENT
Start: 2022-02-16 | End: 2022-02-16 | Stop reason: HOSPADM

## 2022-02-16 RX ORDER — OXYCODONE HYDROCHLORIDE 5 MG/1
5 TABLET ORAL EVERY 4 HOURS PRN
Status: DISCONTINUED | OUTPATIENT
Start: 2022-02-16 | End: 2022-02-16 | Stop reason: HOSPADM

## 2022-02-16 RX ORDER — LIDOCAINE 40 MG/G
CREAM TOPICAL
Status: DISCONTINUED | OUTPATIENT
Start: 2022-02-16 | End: 2022-02-16 | Stop reason: HOSPADM

## 2022-02-16 RX ORDER — ONDANSETRON 2 MG/ML
4 INJECTION INTRAMUSCULAR; INTRAVENOUS EVERY 30 MIN PRN
Status: DISCONTINUED | OUTPATIENT
Start: 2022-02-16 | End: 2022-02-16 | Stop reason: HOSPADM

## 2022-02-16 RX ORDER — MEPERIDINE HYDROCHLORIDE 25 MG/ML
25 INJECTION INTRAMUSCULAR; INTRAVENOUS; SUBCUTANEOUS
Status: DISCONTINUED | OUTPATIENT
Start: 2022-02-16 | End: 2022-02-16 | Stop reason: HOSPADM

## 2022-02-16 RX ORDER — SODIUM CHLORIDE, SODIUM LACTATE, POTASSIUM CHLORIDE, CALCIUM CHLORIDE 600; 310; 30; 20 MG/100ML; MG/100ML; MG/100ML; MG/100ML
INJECTION, SOLUTION INTRAVENOUS CONTINUOUS
Status: DISCONTINUED | OUTPATIENT
Start: 2022-02-16 | End: 2022-02-16 | Stop reason: HOSPADM

## 2022-02-16 RX ORDER — LABETALOL HYDROCHLORIDE 5 MG/ML
10 INJECTION, SOLUTION INTRAVENOUS EVERY 10 MIN PRN
Status: DISCONTINUED | OUTPATIENT
Start: 2022-02-16 | End: 2022-02-16 | Stop reason: HOSPADM

## 2022-02-16 RX ORDER — FENTANYL CITRATE 50 UG/ML
25 INJECTION, SOLUTION INTRAMUSCULAR; INTRAVENOUS
Status: DISCONTINUED | OUTPATIENT
Start: 2022-02-16 | End: 2022-02-16 | Stop reason: HOSPADM

## 2022-02-16 RX ORDER — BUPIVACAINE HYDROCHLORIDE 2.5 MG/ML
INJECTION, SOLUTION EPIDURAL; INFILTRATION; INTRACAUDAL PRN
Status: DISCONTINUED | OUTPATIENT
Start: 2022-02-16 | End: 2022-02-16 | Stop reason: HOSPADM

## 2022-02-16 RX ORDER — HYDROXYZINE HYDROCHLORIDE 10 MG/1
TABLET, FILM COATED ORAL
Qty: 15 TABLET | Refills: 0 | Status: SHIPPED | OUTPATIENT
Start: 2022-02-16 | End: 2022-09-26

## 2022-02-16 RX ORDER — ONDANSETRON 2 MG/ML
INJECTION INTRAMUSCULAR; INTRAVENOUS PRN
Status: DISCONTINUED | OUTPATIENT
Start: 2022-02-16 | End: 2022-02-16

## 2022-02-16 RX ORDER — CHOLECALCIFEROL (VITAMIN D3) 50 MCG
1 TABLET ORAL 2 TIMES DAILY
Qty: 180 TABLET | Refills: 0 | Status: SHIPPED | OUTPATIENT
Start: 2022-02-16

## 2022-02-16 RX ORDER — KETOROLAC TROMETHAMINE 15 MG/ML
15 INJECTION, SOLUTION INTRAMUSCULAR; INTRAVENOUS
Status: DISCONTINUED | OUTPATIENT
Start: 2022-02-16 | End: 2022-02-16 | Stop reason: HOSPADM

## 2022-02-16 RX ADMIN — PROPOFOL 100 MCG/KG/MIN: 10 INJECTION, EMULSION INTRAVENOUS at 07:37

## 2022-02-16 RX ADMIN — FENTANYL CITRATE 50 MCG: 50 INJECTION, SOLUTION INTRAMUSCULAR; INTRAVENOUS at 07:36

## 2022-02-16 RX ADMIN — ONDANSETRON HYDROCHLORIDE 4 MG: 2 INJECTION, SOLUTION INTRAVENOUS at 07:41

## 2022-02-16 RX ADMIN — FENTANYL CITRATE 50 MCG: 50 INJECTION, SOLUTION INTRAMUSCULAR; INTRAVENOUS at 07:33

## 2022-02-16 RX ADMIN — GLYCOPYRROLATE 0.1 MG: 0.2 INJECTION, SOLUTION INTRAMUSCULAR; INTRAVENOUS at 07:33

## 2022-02-16 RX ADMIN — PROPOFOL 20 MG: 10 INJECTION, EMULSION INTRAVENOUS at 07:35

## 2022-02-16 RX ADMIN — Medication 20 MG: at 07:38

## 2022-02-16 RX ADMIN — SODIUM CHLORIDE, POTASSIUM CHLORIDE, SODIUM LACTATE AND CALCIUM CHLORIDE: 600; 310; 30; 20 INJECTION, SOLUTION INTRAVENOUS at 07:05

## 2022-02-16 RX ADMIN — Medication 2 G: at 07:30

## 2022-02-16 RX ADMIN — LIDOCAINE HYDROCHLORIDE 50 MG: 10 INJECTION, SOLUTION INFILTRATION; PERINEURAL at 07:33

## 2022-02-16 NOTE — OP NOTE
Procedure Date: 02/16/2022    SURGEON:  Chato Jeong DPM    PREOPERATIVE DIAGNOSES:  Painful bunion, right foot, with first metatarsophalangeal joint degenerative changes.    POSTOPERATIVE DIAGNOSES:    1.  Painful bunion, right foot, with first metatarsophalangeal joint degenerative changes.  2.  Gouty tophaceous deposits around first metatarsophalangeal joint.    PROCEDURES:     1.  Cheilectomy, right foot.  2.  Mayhill-Green first metatarsal bunionectomy, right foot.  3.  Debridement of joint, right first metatarsophalangeal joint.    ANESTHESIA:  MAC with local.    HEMOSTASIS:  Well-padded pneumatic ankle tourniquet.    ESTIMATED BLOOD LOSS:  Less than 5 mL    MATERIALS:    1.  Arthrex 4.0 mm cannulated screw 18 mm in length.  2.  Smooth 0.045 K-wire.    INJECTABLES:  15 mL of 0.25% bupivacaine plain preoperatively.    COMPLICATIONS:  None apparent.    INDICATIONS FOR PROCEDURE:  The patient is a pleasant 76-year-old male who was seen in clinic for a painful bunion of the right foot.  While the x-rays demonstrated early degenerative changes within the joint, there was fluid range of motion with minimal crepitus and pain.  He wished to forego further nonoperative management in favor of surgical intervention.  We had discussed procedure options and elected to proceed with a Mayhill-Green distal first metatarsal osteotomy as the patient's first metatarsal was mildly short and he had a wide first metatarsal head.    DESCRIPTION OF PROCEDURE:  After obtaining written consent, the patient was transferred to the operating room and placed in the supine position on the operating room table.  IV sedation was initiated.  The foot was anesthetized with a preoperative local.  It was then prepped and draped in normal aseptic fashion, exsanguinated, and the well-padded pneumatic ankle tourniquet was inflated.  The patient, procedure, and site were correctly identified by OR staff.    Procedure 1:  Cheilectomy with  first metatarsal osteotomy and joint debridement:  Attention was directed to the right first metatarsophalangeal joint where an 8 cm curvilinear incision was carried down to subcutaneous tissue just medial to the extensor hallucis longus tendon.  Bleeding vessels were electrocauterized as necessary.  Blunt dissection was used to carry the incision down to periosteal and capsular structures and subperiosteal dissection was performed.  The first intermetatarsal space was bluntly opened and the conjoined tendon of the abductor hallucis, the lateral joint capsule and the fibular sesamoid suspensory ligament were sharply incised, and the varus stress was applied to the joint to reduce the sesamoids.  Attention was then directed medially where blunt dissection was used to carry the incision down to the medial joint capsule with pertinent neurovascular structures protected and retracted.  A T medial capsular incision was performed and the soft tissues reflected off the metatarsal head in full thickness.  At this point, I noticed significant tophaceous deposits within the joint.  There was also full-thickness cartilage loss to the plantar medial aspect of the metatarsal head.  The thickest component of the tophaceous deposits was medially where the patient's painful bump was most prominent.  The joint was debrided with a 15 blade, sharply excising all nonviable/tophaceous material with a 15 blade and a rongeur.  A guide pin was then placed in the first metatarsal as an osteotomy guide, and this was shown to be perpendicular to the second metatarsal with slight plantar flexion of the capital fragment.  The Zaina-Green osteotomy was performed with a saline drip to prevent thermal necrosis, and the capital fragment was translocated laterally approximately 3-4 mm.  This was temporarily fixated with a smooth 0.045 K-wire.  The guide pin for the 4.0 mm cannulated screw was placed and imaging showed good position.  I was able  to visualize the pin site at the plantar yesica for good centralized location.  Using standard AO technique, the 4.0 mm cannulated screw was placed with good purchase and compression.  The repair was further augmented with a smooth 0.045 K-wire driven from proximal medial dorsal to distal lateral plantar.  The mini C-arm was used to confirm that this did not violate the joint surface.  The dorsal bone spur was then resected and remodeled with a rongeur and a sagittal saw, and the saw was used to resect the dorsal medial eminence with care to avoid staking the metatarsal head.  The wound was flushed with copious amounts of normal saline.  A 2 mm medial capsulorrhaphy was performed, and the capsular structures were repaired with the hallux in plantar flexion and slight varus alignment.  The dorsal periosteal structures were closed with 3-0 Vicryl.  Subcutaneous tissue was closed with 4-0 Vicryl and skin was closed with 4-0 nylon.    Dry sterile dressing was applied to the patient's right foot with his hallux splinted in slight varus alignment.  He appeared to tolerate the procedure and anesthesia well and was transferred to the PACU with vital signs stable and vascular status intact.  The patient will be heel weightbearing in a surgical shoe with a walker and will follow up with me in clinic in approximately one week or sooner with any acute issues.    Chato Jeong DPM        D: 2022   T: 2022   MT: EMILIANO    Name:     OTNY LEI  MRN:      [id number]        Account:        [id number]   :      1945           Procedure Date: 2022     Document: [id number]

## 2022-02-16 NOTE — ANESTHESIA PREPROCEDURE EVALUATION
Anesthesia Pre-Procedure Evaluation    Patient: Alexander Alfred   MRN: [id number] : 1945        Preoperative Diagnosis: Bunion, right [M21.611]    Procedure : Procedure(s):  Lapidus bunionectomy, right foot          Past Medical History:   Diagnosis Date     Asthma, mild intermittent     Park nicollet asthma     Verde's esophagus 2020    Dr Ramirez, EGD due 1 yr     EE (eosinophilic esophagitis) 2013    Dr Ramirez     Hyperlipidemia LDL goal <130      Hypertension goal BP (blood pressure) < 140/90      Lumbar degenerative disc disease      Lumbar stenosis     Dr Knight - Calf weakness/atrophy     Nasal polyps 2004    approx 2/year - increased sx from nasal polyps - prednisone 40 mg x 2 days, 20 mg x 2 days, 10mg x 10 days with excellent results     Osteoarthritis of left hip      Other chronic pain      Sensorineural hearing loss (SNHL) of both ears     Dr Hidalgo - R>L     Sleep apnea 2006    moderate - CPAP - 5-15 cm      Past Surgical History:   Procedure Laterality Date     COLONOSCOPY      normal - due      COLONOSCOPY N/A 10/21/2020    normal, consider 10 yrs     DECOMPRESSION, FUSION LUMBAR POSTERIOR THREE + LEVELS, COMBINED N/A 10/07/2014    Dr Knight - COMBINED DECOMPRESSION, FUSION LUMBAR POSTERIOR THREE + LEVELS     ESOPHAGOSCOPY, GASTROSCOPY, DUODENOSCOPY (EGD), COMBINED  2013    Dr Ramirez - EE, non erosive gastropathy     ESOPHAGOSCOPY, GASTROSCOPY, DUODENOSCOPY (EGD), COMBINED N/A 2021    Verde's due 1 yr     ROTATOR CUFF REPAIR RT/LT Left     Lt shoulder - dr armaan lazar     ROTATOR CUFF REPAIR RT/LT Left 2009    Lt shoulder - dr armaan lazar     stress echo  2011    normal     STRESS ECHO ([facility])  2015    normal stress echo     SURGICAL HISTORY OF -       Lt ankle CRIF     SURGICAL HISTORY OF -   2008    blephoplasty     SURGICAL HISTORY OF -   2016    T12-L1 - Decompression - Dr Knight -      TONSILLECTOMY  1950      Allergies    Allergen Reactions     Codeine GI Disturbance     Hydrocodone GI Disturbance     Oxycodone GI Disturbance      Social History     Tobacco Use     Smoking status: Never Smoker     Smokeless tobacco: Never Used   Substance Use Topics     Alcohol use: Yes     Alcohol/week: 11.7 standard drinks     Comment: 2 drinks per day avg      Wt Readings from Last 1 Encounters:   02/16/22 84.4 kg (186 lb)        Anesthesia Evaluation   Pt has had prior anesthetic. Type: General.    No history of anesthetic complications       ROS/MED HX  ENT/Pulmonary:     (+) sleep apnea, uses CPAP, Intermittent, asthma     Neurologic:  - neg neurologic ROS     Cardiovascular:  - neg cardiovascular ROS   (+) hypertension-----    METS/Exercise Tolerance:     Hematologic:  - neg hematologic  ROS     Musculoskeletal:       GI/Hepatic:  - neg GI/hepatic ROS     Renal/Genitourinary:  - neg Renal ROS     Endo:  - neg endo ROS     Psychiatric/Substance Use:  - neg psychiatric ROS     Infectious Disease:  - neg infectious disease ROS     Malignancy:  - neg malignancy ROS     Other:  - neg other ROS    (+) , H/O Chronic Pain,        Physical Exam    Airway        Mallampati: II   TM distance: > 3 FB   Neck ROM: full   Mouth opening: > 3 cm    Respiratory Devices and Support         Dental  no notable dental history         Cardiovascular   cardiovascular exam normal          Pulmonary   pulmonary exam normal                OUTSIDE LABS:  CBC:   Lab Results   Component Value Date    WBC 6.3 12/24/2021    WBC 6.1 08/16/2021    HGB 14.7 12/24/2021    HGB 13.5 08/16/2021    HCT 43.5 12/24/2021    HCT 39.7 (L) 08/16/2021     12/24/2021     08/16/2021     BMP:   Lab Results   Component Value Date     12/24/2021     08/16/2021    POTASSIUM 4.0 12/24/2021    POTASSIUM 3.9 08/16/2021    CHLORIDE 105 12/24/2021    CHLORIDE 109 08/16/2021    CO2 29 12/24/2021    CO2 31 08/16/2021    BUN 17 12/24/2021    BUN 18 08/16/2021    CR 1.26 (H)  12/24/2021    CR 1.12 08/16/2021     (H) 12/24/2021    GLC 97 08/16/2021     COAGS: No results found for: PTT, INR, FIBR  POC: No results found for: BGM, HCG, HCGS  HEPATIC:   Lab Results   Component Value Date    ALBUMIN 3.9 12/24/2021    PROTTOTAL 6.8 12/24/2021    ALT 58 12/24/2021    AST 34 12/24/2021    ALKPHOS 94 12/24/2021    BILITOTAL 0.5 12/24/2021     OTHER:   Lab Results   Component Value Date    A1C 5.5 12/24/2021    MONE 8.8 12/24/2021    TSH 1.79 08/16/2021    CRP <5.0 12/10/2013    SED 5 12/10/2013       Anesthesia Plan    ASA Status:  2   NPO Status:  NPO Appropriate    Anesthesia Type: MAC.              Consents    Anesthesia Plan(s) and associated risks, benefits, and realistic alternatives discussed. Questions answered and patient/representative(s) expressed understanding.    - Discussed:     - Discussed with:  Patient         Postoperative Care    Pain management: IV analgesics, Oral pain medications, Multi-modal analgesia.   PONV prophylaxis: Ondansetron (or other 5HT-3), Dexamethasone or Solumedrol     Comments:                Teo Hawkins MD

## 2022-02-16 NOTE — ANESTHESIA POSTPROCEDURE EVALUATION
Patient: Alexander Alfred    Procedure: Procedure(s):  Lapidus bunionectomy, right foot       Diagnosis:Bunion, right [M21.611]  Diagnosis Additional Information: No value filed.    Anesthesia Type:  MAC    Note:     Postop Pain Control: Uneventful            Sign Out: Well controlled pain   PONV: No   Neuro/Psych: Uneventful            Sign Out: Acceptable/Baseline neuro status   Airway/Respiratory: Uneventful            Sign Out: Acceptable/Baseline resp. status   CV/Hemodynamics: Uneventful            Sign Out: Acceptable CV status   Other NRE: NONE   DID A NON-ROUTINE EVENT OCCUR? No           Last vitals:  Vitals Value Taken Time   /54 02/16/22 0915   Temp 98.2  F (36.8  C) 02/16/22 0900   Pulse 46 02/16/22 0915   Resp     SpO2 93 % 02/16/22 0916   Vitals shown include unvalidated device data.    Electronically Signed By: Teo Hawkins MD  February 16, 2022  9:47 AM

## 2022-02-16 NOTE — ANESTHESIA CARE TRANSFER NOTE
Patient: Alexander Alfred    Procedure: Procedure(s):  Lapidus bunionectomy, right foot       Diagnosis: Bunion, right [M21.611]  Diagnosis Additional Information: No value filed.    Anesthesia Type:   MAC     Note:    Oropharynx: oropharynx clear of all foreign objects  Level of Consciousness: drowsy  Oxygen Supplementation: face mask  Level of Supplemental Oxygen (L/min / FiO2): 6  Independent Airway: airway patency satisfactory and stable  Dentition: dentition unchanged  Vital Signs Stable: post-procedure vital signs reviewed and stable  Report to RN Given: handoff report given  Patient transferred to: Phase II    Handoff Report: Identifed the Patient, Identified the Reponsible Provider, Reviewed the pertinent medical history, Discussed the surgical course, Reviewed Intra-OP anesthesia mangement and issues during anesthesia, Set expectations for post-procedure period and Allowed opportunity for questions and acknowledgement of understanding      Vitals:  Vitals Value Taken Time   /59 02/16/22 0855   Temp     Pulse 51 02/16/22 0855   Resp     SpO2 99 % 02/16/22 0859   Vitals shown include unvalidated device data.    Electronically Signed By: NESTOR Virk CRNA  February 16, 2022  8:59 AM

## 2022-02-16 NOTE — DISCHARGE INSTRUCTIONS
SEDATION ADULT DISCHARGE INSTRUCTIONS   SPECIAL PRECAUTIONS FOR 24 HOURS AFTER SURGERY    IT IS NOT UNUSUAL TO FEEL LIGHT-HEADED OR FAINT, UP TO 24 HOURS AFTER SURGERY OR WHILE TAKING PAIN MEDICATION.  IF YOU HAVE THESE SYMPTOMS; SIT FOR A FEW MINUTES BEFORE STANDING AND HAVE SOMEONE ASSIST YOU WHEN YOU GET UP TO WALK OR USE THE BATHROOM.    YOU SHOULD REST AND RELAX FOR THE NEXT 24 HOURS AND YOU MUST MAKE ARRANGEMENTS TO HAVE SOMEONE STAY WITH YOU FOR AT LEAST 24 HOURS AFTER YOUR DISCHARGE.  AVOID HAZARDOUS AND STRENUOUS ACTIVITIES.  DO NOT MAKE IMPORTANT DECISIONS FOR 24 HOURS.    DO NOT DRIVE ANY VEHICLE OR OPERATE MECHANICAL EQUIPMENT FOR 24 HOURS FOLLOWING THE END OF YOUR SURGERY.  EVEN THOUGH YOU MAY FEEL NORMAL, YOUR REACTIONS MAY BE AFFECTED BY THE MEDICATION YOU HAVE RECEIVED.    DO NOT DRINK ALCOHOLIC BEVERAGES FOR 24 HOURS FOLLOWING YOUR SURGERY.    DRINK CLEAR LIQUIDS (APPLE JUICE, GINGER ALE, 7-UP, BROTH, ETC.).  PROGRESS TO YOUR REGULAR DIET AS YOU FEEL ABLE.    YOU MAY HAVE A DRY MOUTH, A SORE THROAT, MUSCLES ACHES OR TROUBLE SLEEPING.  THESE SHOULD GO AWAY AFTER 24 HOURS.    CALL YOUR DOCTOR FOR ANY OF THE FOLLOWING:  SIGNS OF INFECTION (FEVER, GROWING TENDERNESS AT THE SURGERY SITE, A LARGE AMOUNT OF DRAINAGE OR BLEEDING, SEVERE PAIN, FOUL-SMELLING DRAINAGE, REDNESS OR SWELLING.    IT HAS BEEN OVER 8 TO 10 HOURS SINCE SURGERY AND YOU ARE STILL NOT ABLE TO URINATE (PASS WATER).     Dr. Chato Jeong, [facility]  Clinic phone number:  [phone]    Maximum acetaminophen (Tylenol) dose from all sources should not exceed 4 grams (4000 mg) per day.    Maximum ibuprofen dose from all sources should not exceed 3 grams (3000 mg) per day.

## 2022-02-16 NOTE — BRIEF OP NOTE
[facility]    Brief Operative Note    Pre-operative diagnosis: Bunion, right [M21.611]  Post-operative diagnosis sp Zaina-Green bunionectomy right foot    Procedure: Procedure(s):  Lapidus bunionectomy, right foot  Surgeon: Surgeon(s) and Role:     * Chato Jeong DPM - Primary  Anesthesia: Combined General with Block   Estimated Blood Loss: 5 mL from 2/16/2022  7:33 AM to 2/16/2022  8:54 AM      Drains: None  Specimens: * No specimens in log *  Findings:   Gouty deposits within joint; full-thickness cartilage loss plantar medial metatarsal head.  Complications: None.  Implants:   Implant Name Type Inv. Item Serial No.  Lot No. LRB No. Used Action   4mm low profile screws, cannulated, titanium, partially threaded    [employer] [id number]  [id number] Right 1 Implanted   4mm low profile screws, cannulated, titanium, partially threaded    [employer] [id number]  [id number] Right 1 Wasted

## 2022-02-21 ENCOUNTER — TELEPHONE (OUTPATIENT)
Dept: PODIATRY | Facility: CLINIC | Age: 77
End: 2022-02-21
Payer: COMMERCIAL

## 2022-02-21 NOTE — TELEPHONE ENCOUNTER
Reason for call:  Patient had bunion surgery last week. He said he is recovering well and not experiencing pain with our without the boot. He would like to know if it is ok to bear weight on heel without wearing boot if not causing pain.    Home number on file [phone] (home)

## 2022-02-21 NOTE — TELEPHONE ENCOUNTER
Returned call to patient.   Verified that patient to continue wearing post op splint until seen in follow up on 2/24/22.   Patient states it is quite clumsy and was hopeful since he had minimal pain, and not needing pain medication that he could continue use of dressings only.     Patient confirmed he will continue use of boot.     Tawanna Kowalski, ATC

## 2022-02-24 ENCOUNTER — TELEPHONE (OUTPATIENT)
Dept: PODIATRY | Facility: CLINIC | Age: 77
End: 2022-02-24

## 2022-02-24 ENCOUNTER — OFFICE VISIT (OUTPATIENT)
Dept: PODIATRY | Facility: CLINIC | Age: 77
End: 2022-02-24
Payer: COMMERCIAL

## 2022-02-24 VITALS
HEIGHT: 67 IN | BODY MASS INDEX: 29.19 KG/M2 | WEIGHT: 186 LBS | DIASTOLIC BLOOD PRESSURE: 74 MMHG | SYSTOLIC BLOOD PRESSURE: 134 MMHG

## 2022-02-24 DIAGNOSIS — Z98.890 S/P BUNIONECTOMY: Primary | ICD-10-CM

## 2022-02-24 PROCEDURE — 99024 POSTOP FOLLOW-UP VISIT: CPT | Performed by: PODIATRIST

## 2022-02-24 NOTE — LETTER
"    2/24/2022         RE: Alexander Alfred  [address] [address] [place] [address]        Dear Colleague,    Thank you for referring your patient, Alexander Alfred, to the [facility] PODIATRY. Please see a copy of my visit note below.    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  Pain levels minimal.  He stopped taking pain medication on Sunday.  He called in on 2/21/2022 stating he was not having any pain with or without the boot and wanted to know if he could bear weight just with a surgical bandage.  He was advised to continue with the surgical shoe.  \"I have a confession to make, I have not been a very compliant patient\".  He states he has walked on the foot without the surgical shoe on.  He is not icing or doing the blood clot prevention exercises much.    There were no vitals taken for this visit.      ROS - positive for CC.  Patient denies current nausea, vomiting, chills, fevers, belly pain, calf pain, chest pain or SOB.  Complete remainder of ROS is otherwise neg.    PE -sutures intact, skin margins well coapted.  There is mild swelling but within normal limits for the stage postop.  Loading of the foot shows the great toe and second toe parallel.  Passive range of motion of the first MPJ shows approximately 45 degrees of dorsiflexion with essentially no crepitus and minimal pain.  Skin shows no trophic, color or temperature changes otherwise.  No calf redness, swelling or pain noted otherwise.    Imaging -2 intraoperative x-rays show a distal first metatarsal osteotomy with cannulated screw and smooth K wire fixation.      A/P - 76 year old yo patient approx 1 week sp above procedure  -I personally reviewed and interpreted his x-ray results with him.  We also discussed the finding of tophaceous deposits within the joint.  -I strongly advised compliance with the " postoperative instructions, including heel weightbearing in the surgical shoe (utilizing his walker as necessary), icing, elevation, resting.  We discussed that excessive weight at this point out from surgery can, worse case scenario because the capital fragment to dislodge, and at minimum delay healing  -No pain medication refill request    Follow up  -1 week for suture removal or sooner with acute issues    Plan for [id number] - retired       Chato Jeong DPM [facility]  Podiatric Foot & Ankle Surgeon  [facility]  [phone]    Disclaimer: This note consists of symbols derived from keyboarding, dictation and/or voice recognition software. As a result, there may be errors in the script that have gone undetected. Please consider this when interpreting information found in this chart.          Again, thank you for allowing me to participate in the care of your patient.        Sincerely,        Chato Jeong DPM, DPM

## 2022-02-24 NOTE — TELEPHONE ENCOUNTER
Reason for call: Patient had appointment today and forgot to request parking permit form. He would like emailed or mailed to him. He requests return call today.    Home number on file [phone] (home)

## 2022-02-24 NOTE — PROGRESS NOTES
"Foot & Ankle Surgery  February 24, 2022    S:  Patient in today sp Zaina Ayala bunionectomy with resection of gouty tophus and joint debridement right foot on 2/16/2022.  Pain levels minimal.  He stopped taking pain medication on Sunday.  He called in on 2/21/2022 stating he was not having any pain with or without the boot and wanted to know if he could bear weight just with a surgical bandage.  He was advised to continue with the surgical shoe.  \"I have a confession to make, I have not been a very compliant patient\".  He states he has walked on the foot without the surgical shoe on.  He is not icing or doing the blood clot prevention exercises much.    There were no vitals taken for this visit.      ROS - positive for CC.  Patient denies current nausea, vomiting, chills, fevers, belly pain, calf pain, chest pain or SOB.  Complete remainder of ROS is otherwise neg.    PE -sutures intact, skin margins well coapted.  There is mild swelling but within normal limits for the stage postop.  Loading of the foot shows the great toe and second toe parallel.  Passive range of motion of the first MPJ shows approximately 45 degrees of dorsiflexion with essentially no crepitus and minimal pain.  Skin shows no trophic, color or temperature changes otherwise.  No calf redness, swelling or pain noted otherwise.    Imaging -2 intraoperative x-rays show a distal first metatarsal osteotomy with cannulated screw and smooth K wire fixation.      A/P - 76 year old yo patient approx 1 week sp above procedure  -I personally reviewed and interpreted his x-ray results with him.  We also discussed the finding of tophaceous deposits within the joint.  -I strongly advised compliance with the postoperative instructions, including heel weightbearing in the surgical shoe (utilizing his walker as necessary), icing, elevation, resting.  We discussed that excessive weight at this point out from surgery can, worse case scenario because the capital " fragment to dislodge, and at minimum delay healing  -No pain medication refill request    Follow up  -1 week for suture removal or sooner with acute issues    Plan for [id number] - retired       Chato Jeong DPM FACFAS FACFAOM  Podiatric Foot & Ankle Surgeon  [facility]  [phone]    Disclaimer: This note consists of symbols derived from keyboarding, dictation and/or voice recognition software. As a result, there may be errors in the script that have gone undetected. Please consider this when interpreting information found in this chart.

## 2022-02-25 NOTE — TELEPHONE ENCOUNTER
Reason for call:  Message from Alexander regarding a request to renew his disability parking. Would like an application mailed or preferred, emailed.  Also asks for a call back to confirm receipt of this message.    Phone number to reach patient:  Cell number on file:    Telephone Information:   Mobile [phone]       Best Time:  -    Can we leave a detailed message on this number?  NO

## 2022-02-25 NOTE — TELEPHONE ENCOUNTER
Form completed, will notify patient for pick-up    Chato Jeong DPM [facility]  Podiatric Foot & Ankle Surgeon  [facility]  [phone]

## 2022-03-03 ENCOUNTER — ANCILLARY PROCEDURE (OUTPATIENT)
Dept: GENERAL RADIOLOGY | Facility: CLINIC | Age: 77
End: 2022-03-03
Attending: PODIATRIST
Payer: COMMERCIAL

## 2022-03-03 ENCOUNTER — OFFICE VISIT (OUTPATIENT)
Dept: PODIATRY | Facility: CLINIC | Age: 77
End: 2022-03-03
Payer: COMMERCIAL

## 2022-03-03 VITALS
BODY MASS INDEX: 29.19 KG/M2 | SYSTOLIC BLOOD PRESSURE: 151 MMHG | WEIGHT: 186 LBS | DIASTOLIC BLOOD PRESSURE: 82 MMHG | HEIGHT: 67 IN

## 2022-03-03 DIAGNOSIS — Z98.890 POST-OPERATIVE STATE: Primary | ICD-10-CM

## 2022-03-03 DIAGNOSIS — Z98.890 POST-OPERATIVE STATE: ICD-10-CM

## 2022-03-03 PROCEDURE — 73630 X-RAY EXAM OF FOOT: CPT | Mod: RT | Performed by: RADIOLOGY

## 2022-03-03 PROCEDURE — 99024 POSTOP FOLLOW-UP VISIT: CPT | Performed by: PODIATRIST

## 2022-03-03 NOTE — LETTER
"    3/3/2022         RE: Alexander Alfred  [address] [address]        Dear Colleague,    Thank you for referring your patient, Alexander Alfred, to the [facility] PODIATRY. Please see a copy of my visit note below.    Podiatry / Foot and Ankle Surgery Progress Note    March 3, 2022    Subject: Patient was seen for 1 month status post right foot bunion repair with Dr. Galdamez.  Patient notes he is doing well.  Minimal pain to the foot.  Denies fever, nausea, vomiting.  In his postop shoe today.    Objective:  Vitals: BP (!) 142/83   Ht 1.702 m (5' 7\")   Wt 84.4 kg (186 lb)   BMI 29.13 kg/m      General:  Patient is alert and orientated.  NAD.    Vascular:  DP and PT pulses are palpable.  No edema or varicosities noted.  CFT's < 3secs.  Skin temp is normal.    Neuro:  Light and gross touch sensation intact to digits, dorsum, and plantar aspects of the feet.    Derm: Dressing is clean dry and intact.  Sutures are intact.  No redness, dehiscence or signs of acute infection noted.    Musculoskeletal:  No foot deformity noted.      Imaging: Right foot x-ray  -I personally reviewed the xrays -hardware in good position.  Bunion corrected.  No loosening of hardware noted.  Otherwise unremarkable.    Assessment: Post-operative state      Medical Decision Making/Plan: At this time the sutures were removed.  Patient can get the foot wet in the shower.  He can wear a regular sock or compression sleeve that he was given today.  He can start to lotion the feet.  He will continue minimal weightbearing in a postop shoe and can still elevate the feet 3 times a day for 15 minutes.  We will have him follow-up in 1 month with Dr. Garces for reassessment and carie-ray.  All questions were answered to patient satisfaction and he will call further questions or concerns.      Patient Risk Factor:  Patient is a low risk factor for infection.     Melyssa Coley DPM, Podiatry/Foot and Ankle " Surgery        Again, thank you for allowing me to participate in the care of your patient.        Sincerely,        Melyssa Coley DPM, Podiatry/Foot and Ankle Surgery

## 2022-03-03 NOTE — PROGRESS NOTES
"Podiatry / Foot and Ankle Surgery Progress Note    March 3, 2022    Subject: Patient was seen for 1 month status post right foot bunion repair with Dr. Galdamez.  Patient notes he is doing well.  Minimal pain to the foot.  Denies fever, nausea, vomiting.  In his postop shoe today.    Objective:  Vitals: BP (!) 142/83   Ht 1.702 m (5' 7\")   Wt 84.4 kg (186 lb)   BMI 29.13 kg/m      General:  Patient is alert and orientated.  NAD.    Vascular:  DP and PT pulses are palpable.  No edema or varicosities noted.  CFT's < 3secs.  Skin temp is normal.    Neuro:  Light and gross touch sensation intact to digits, dorsum, and plantar aspects of the feet.    Derm: Dressing is clean dry and intact.  Sutures are intact.  No redness, dehiscence or signs of acute infection noted.    Musculoskeletal:  No foot deformity noted.      Imaging: Right foot x-ray  -I personally reviewed the xrays -hardware in good position.  Bunion corrected.  No loosening of hardware noted.  Otherwise unremarkable.    Assessment: Post-operative state      Medical Decision Making/Plan: At this time the sutures were removed.  Patient can get the foot wet in the shower.  He can wear a regular sock or compression sleeve that he was given today.  He can start to lotion the feet.  He will continue minimal weightbearing in a postop shoe and can still elevate the feet 3 times a day for 15 minutes.  We will have him follow-up in 1 month with Dr. Garces for reassessment and carie-ray.  All questions were answered to patient satisfaction and he will call further questions or concerns.      Patient Risk Factor:  Patient is a low risk factor for infection.     Melyssa Coley DPM, Podiatry/Foot and Ankle Surgery    "

## 2022-03-03 NOTE — PATIENT INSTRUCTIONS
Thank you for choosing [facility] Podiatry / Foot & Ankle Surgery!    DR TANNER'S CLINIC:  [facility]   [address]   [place] [address]      TRIAGE LINE: [phone] - Opt[address]  APPOINTMENTS: [phone]  RADIOLOGY: [phone]  SET UP SURGERY: [phone]  FAX NUMBER: [phone]  BILLING QUESTIONS: [phone]       Follow up: 4 weeks with Dr. Jeong     SCAR CARE PROTOCOL  Scarring is an unfortunate but unavoidable part of surgery.  Every person scars differently and there is no way to predict how an individual's final scar will look.  Now that the sutures have been removed one can begin taking some steps to help minimize the appearance of scarring.    WOUND HEALING  As soon as the skin is incised during surgery, the body is taking steps to prepare for healing. After about 3 days, the body has sent cells to the incision to begin the healing process. These cells, called fibroblasts, make collagen, a protein in the skin that helps provide strength. Once the skin has been sufficiently strengthened, the sutures are removed. Over the next year, the body synthesizes new collagen and breaks down old collagen to help achieve a strong scar that allows the foot/ankle to function appropriately. This is where patients can help the appearance of the scar, as it will change over the next year.    STEPS  1. Do not expose the scar to the sun for 1 year.  2. Any sun exposure may permanently darken the appearance of the scar.  3. Wear shoes/socks or cover your scar with zinc oxide.  4. Massage the scar 2-3 times per day.  -Massage the entire length of the scar with gentle to moderate pressure.  -Pressure can help flatten the scar.  5. Lotion/Vitamin E helps keep the tissue soft.  6. Try over the counter scar products such as Mederma or Scar Zone.  -These are available at any pharmacy without a prescription.  -Patients must use these for extended periods of time (6-12 months) to see a  difference.

## 2022-03-31 ENCOUNTER — ANCILLARY PROCEDURE (OUTPATIENT)
Dept: GENERAL RADIOLOGY | Facility: CLINIC | Age: 77
End: 2022-03-31
Attending: PODIATRIST
Payer: COMMERCIAL

## 2022-03-31 ENCOUNTER — OFFICE VISIT (OUTPATIENT)
Dept: PODIATRY | Facility: CLINIC | Age: 77
End: 2022-03-31
Payer: COMMERCIAL

## 2022-03-31 VITALS
BODY MASS INDEX: 29.19 KG/M2 | WEIGHT: 186 LBS | HEIGHT: 67 IN | DIASTOLIC BLOOD PRESSURE: 80 MMHG | SYSTOLIC BLOOD PRESSURE: 144 MMHG

## 2022-03-31 DIAGNOSIS — Z98.890 POST-OPERATIVE STATE: Primary | ICD-10-CM

## 2022-03-31 DIAGNOSIS — Z98.890 S/P BUNIONECTOMY: ICD-10-CM

## 2022-03-31 DIAGNOSIS — Z98.890 POST-OPERATIVE STATE: ICD-10-CM

## 2022-03-31 PROCEDURE — 73630 X-RAY EXAM OF FOOT: CPT | Mod: RT | Performed by: RADIOLOGY

## 2022-03-31 PROCEDURE — 99024 POSTOP FOLLOW-UP VISIT: CPT | Performed by: PODIATRIST

## 2022-03-31 NOTE — PROGRESS NOTES
"Foot & Ankle Surgery  March 31, 2022    S:  Patient in today sp cheilectomy, Zaina Green first metatarsal osteotomy, and debridement of first metatarsophalangeal joint right lower extremity on 2/16/2022.  Pain levels minimal.  He states he has not had any pain since 2 days from surgery..  His foot is \"awesome\".  He has occasionally ambulated at home outside the surgical shoe but otherwise is wearing it for the majority of waking hours.  He was very active prior to surgery and being limited in his activities postop has been very difficult for him.    There were no vitals taken for this visit.      ROS - positive for CC.  Patient denies current nausea, vomiting, chills, fevers, belly pain, calf pain, chest pain or SOB.  Complete remainder of ROS is otherwise neg.    PE -the incision has healed very well.  He has at least 45 degrees of dorsiflexion with minimal crepitus or pain..  Skin shows no trophic, color or temperature changes otherwise.  No calf redness, swelling or pain noted otherwise.    Imaging -4 views weightbearing right foot -distal first metatarsal osteotomy.  The osteotomy lines are not readily visible suggestive of interval healing.  A single screw and single K wire are in place without evidence of complication.    A/P - 76 year old yo patient approx 6 weeks sp above procedure  -I personally reviewed and interpreted the x-ray results.  The capital fragment is well seated.  The osteotomy site is less visible indicative of healing.  No hardware complications noted.  -Postop downtime has been very difficult for him.  He will slowly increase weight on the entire foot over the next 2 weeks and if in 2 weeks he is full weightbearing in the surgical shoe without pain at the osteotomy site, he is cleared to transition to a comfortable shoe.  Advised minimal shoeless ambulation  -He is cleared to go to the gym to work on upper body and nonweightbearing lower body exercises.  He should not be standing or " bearing weight on the foot during any exercises.  He is cleared to swim although I expressed my concern for walking to and from the pool on a possibly slippery surface barefoot.  He is also okay to use an exercise bike with the pedal on the medial arch of the heel.    Follow up  -4 weeks or sooner with acute issues    Plan for [id number] -retired      Chato Jeong DPM [facility] FACFAOM  Podiatric Foot & Ankle Surgeon  [facility]  [phone]    Disclaimer: This note consists of symbols derived from keyboarding, dictation and/or voice recognition software. As a result, there may be errors in the script that have gone undetected. Please consider this when interpreting information found in this chart.

## 2022-04-26 ENCOUNTER — OFFICE VISIT (OUTPATIENT)
Dept: PODIATRY | Facility: CLINIC | Age: 77
End: 2022-04-26
Payer: COMMERCIAL

## 2022-04-26 VITALS
DIASTOLIC BLOOD PRESSURE: 66 MMHG | SYSTOLIC BLOOD PRESSURE: 140 MMHG | BODY MASS INDEX: 28.25 KG/M2 | HEIGHT: 67 IN | WEIGHT: 180 LBS

## 2022-04-26 DIAGNOSIS — Z98.890 POST-OPERATIVE STATE: Primary | ICD-10-CM

## 2022-04-26 DIAGNOSIS — Z98.890 S/P BUNIONECTOMY: ICD-10-CM

## 2022-04-26 PROCEDURE — 99024 POSTOP FOLLOW-UP VISIT: CPT | Performed by: PODIATRIST

## 2022-04-26 NOTE — PROGRESS NOTES
"Foot & Ankle Surgery  April 26, 2022    S:  Patient in today sp cheilectomy, Zaina Green first metatarsal osteotomy and debridement of right first metatarsal phalangeal joint for painful bunion on 2/16/2022.  Pain levels minimal.  \"I am very pleased, no pain\".  He was last seen on 3/31/2022 and cleared to return to the gym for upper body exercises.  I advised he be cautious with activities on his feet but he had already been ambulating outside the surgical shoe on his foot at that point.  We discussed that if the surgical shoe was contributing to some discomfort, and he is full weightbearing in the shoe without pain at the surgical site, he could transition to a comfortable shoe with the caveat of minimizing shoeless ambulation.  He wonders when he can get back to vacuuming around the house without shoes on and wondering about returning to [facility].    Ht 1.702 m (5' 7\")   BMI 29.13 kg/m        ROS - positive for CC.  Patient denies current nausea, vomiting, chills, fevers, belly pain, calf pain, chest pain or SOB.  Complete remainder of ROS is otherwise neg.    PE -incisions very well-healed.  Mild swelling around the scar tissue, within normal limits for the stage postop.  Passive range of motion of the joint shows approximately 50 degrees of dorsiflexion with essentially no pain or crepitus noted.  No pain with stressing of the osteotomy site..  Skin shows no trophic, color or temperature changes otherwise.  No calf redness, swelling or pain noted otherwise.    Imaging -3 views weightbearing right foot - IMPRESSION: Distal first metatarsal osteotomy with screw and pin  fixation. Decreasing soft tissue swelling. Remainder unchanged.    A/P - 76 year old yo patient approx 10 weeks sp above procedure  -I personally reviewed and interpreted the x-rays.  The hardware is well seated without evidence of loosening or distraction.  The osteotomy is no longer readily visible on multiple views suggestive of healing.  " The joint is congruous.  -Activity wise, he can slowly increase activity levels to tolerance with care to avoid exceeding pain levels at the surgical site with either the amount of time or weight on the foot or certain activities, as this can have a negative impact on further healing  -He is back to regular shoes and has been so for a couple weeks.  He is back to many activities without discomfort.  -He is cleared to start driving.  I again encouraged him to practice on an empty street or parking lot prior to heading out on the road.  -Regarding motocross, he states he does not do big jumps or a lot of aggressive maneuvers.  He states he is at least a few weeks out from starting.  I think it is reasonable in his motocross boots to ride the bike for short period of time and to slowly increase activity levels to tolerance    Follow up  -6 months from date of surgery or sooner with acute issues    Plan for [id number]-nkyo -retired      Chato Jeong DPM FACFAS FACFAOM  Podiatric Foot & Ankle Surgeon  [facility]  [phone]    Disclaimer: This note consists of symbols derived from keyboarding, dictation and/or voice recognition software. As a result, there may be errors in the script that have gone undetected. Please consider this when interpreting information found in this chart.

## 2022-06-09 DIAGNOSIS — I10 HYPERTENSION GOAL BP (BLOOD PRESSURE) < 140/90: ICD-10-CM

## 2022-06-09 RX ORDER — LOSARTAN POTASSIUM AND HYDROCHLOROTHIAZIDE 12.5; 5 MG/1; MG/1
1 TABLET ORAL DAILY
Qty: 90 TABLET | Refills: 3 | Status: SHIPPED | OUTPATIENT
Start: 2022-06-09 | End: 2022-09-26

## 2022-06-09 NOTE — TELEPHONE ENCOUNTER
rx done, cpx after 8/16/2021    BP Readings from Last 3 Encounters:   04/26/22 (!) 140/66   03/31/22 (!) 144/80   03/03/22 (!) 151/82     Advise BP recheck soon    Creatinine   Date Value Ref Range Status   12/24/2021 1.26 (H) 0.66 - 1.25 mg/dL Final   09/09/2020 1.15 0.66 - 1.25 mg/dL Final     GFR Estimate   Date Value Ref Range Status   12/24/2021 59 (L) >60 mL/min/1.73m2 Final     Comment:     Effective December 21, 2021 eGFRcr in adults is calculated using the 2021 CKD-EPI creatinine equation which includes age and gender (Paulie et al., NEJ, DOI: 10.1056/RJBEuo9862766)   09/09/2020 62 >60 mL/min/[1.73_m2] Final     Comment:     Non  GFR Calc  Starting 12/18/2018, serum creatinine based estimated GFR (eGFR) will be   calculated using the Chronic Kidney Disease Epidemiology Collaboration   (CKD-EPI) equation.

## 2022-06-09 NOTE — TELEPHONE ENCOUNTER
Reason for Call:  Medication or medication refill:    Do you use a [facility]?  Name of the pharmacy and phone number for the current request:  [facility] - [phone]    Name of the medication requested: Losartan- Hydrochlorothiazide     Other request: [facility] [address] does not have a pharmacist so he needs his scripts [facility] to [facility] [address]. Please advise.     Can we leave a detailed message on this number? YES    Phone number patient can be reached at: Cell number on file:    Telephone Information:   Mobile [phone]       Best Time: Any     Call taken on 6/9/2022 at 11:08 AM by Chava Farley

## 2022-07-11 ENCOUNTER — MYC MEDICAL ADVICE (OUTPATIENT)
Dept: FAMILY MEDICINE | Facility: CLINIC | Age: 77
End: 2022-07-11

## 2022-07-11 NOTE — TELEPHONE ENCOUNTER
Please see my chart message below     Please review and advise     Thank you     Dianna Geronimo RN, BSN  [facility]

## 2022-07-13 NOTE — TELEPHONE ENCOUNTER
My chart message sent     Dianna Geronimo RN, BSN  [facility]

## 2022-07-27 ENCOUNTER — HOSPITAL ENCOUNTER (OUTPATIENT)
Facility: CLINIC | Age: 77
Discharge: HOME | End: 2022-07-27
Attending: INTERNAL MEDICINE | Admitting: INTERNAL MEDICINE
Payer: COMMERCIAL

## 2022-07-27 VITALS
SYSTOLIC BLOOD PRESSURE: 165 MMHG | HEIGHT: 68 IN | OXYGEN SATURATION: 95 % | BODY MASS INDEX: 26.52 KG/M2 | RESPIRATION RATE: 16 BRPM | HEART RATE: 47 BPM | DIASTOLIC BLOOD PRESSURE: 79 MMHG | WEIGHT: 175 LBS | TEMPERATURE: 97.2 F

## 2022-07-27 LAB — UPPER GI ENDOSCOPY: NORMAL

## 2022-07-27 PROCEDURE — 250N000009 HC RX 250: Performed by: INTERNAL MEDICINE

## 2022-07-27 PROCEDURE — 88305 TISSUE EXAM BY PATHOLOGIST: CPT | Mod: TC | Performed by: INTERNAL MEDICINE

## 2022-07-27 PROCEDURE — 999N000099 HC STATISTIC MODERATE SEDATION < 10 MIN: Performed by: INTERNAL MEDICINE

## 2022-07-27 PROCEDURE — 43239 EGD BIOPSY SINGLE/MULTIPLE: CPT | Performed by: INTERNAL MEDICINE

## 2022-07-27 PROCEDURE — 250N000011 HC RX IP 250 OP 636: Performed by: INTERNAL MEDICINE

## 2022-07-27 PROCEDURE — 88305 TISSUE EXAM BY PATHOLOGIST: CPT | Mod: 26 | Performed by: PATHOLOGY

## 2022-07-27 RX ORDER — LIDOCAINE 40 MG/G
CREAM TOPICAL
Status: DISCONTINUED | OUTPATIENT
Start: 2022-07-27 | End: 2022-07-27 | Stop reason: HOSPADM

## 2022-07-27 RX ORDER — SIMETHICONE 40MG/0.6ML
133 SUSPENSION, DROPS(FINAL DOSAGE FORM)(ML) ORAL
Status: DISCONTINUED | OUTPATIENT
Start: 2022-07-27 | End: 2022-07-27 | Stop reason: HOSPADM

## 2022-07-27 RX ORDER — EPINEPHRINE 1 MG/ML
0.1 INJECTION, SOLUTION INTRAMUSCULAR; SUBCUTANEOUS
Status: DISCONTINUED | OUTPATIENT
Start: 2022-07-27 | End: 2022-07-27 | Stop reason: HOSPADM

## 2022-07-27 RX ORDER — NALOXONE HYDROCHLORIDE 0.4 MG/ML
0.2 INJECTION, SOLUTION INTRAMUSCULAR; INTRAVENOUS; SUBCUTANEOUS
Status: DISCONTINUED | OUTPATIENT
Start: 2022-07-27 | End: 2022-07-27 | Stop reason: HOSPADM

## 2022-07-27 RX ORDER — ONDANSETRON 2 MG/ML
4 INJECTION INTRAMUSCULAR; INTRAVENOUS
Status: DISCONTINUED | OUTPATIENT
Start: 2022-07-27 | End: 2022-07-27 | Stop reason: HOSPADM

## 2022-07-27 RX ORDER — DIPHENHYDRAMINE HYDROCHLORIDE 50 MG/ML
25-50 INJECTION INTRAMUSCULAR; INTRAVENOUS
Status: DISCONTINUED | OUTPATIENT
Start: 2022-07-27 | End: 2022-07-27 | Stop reason: HOSPADM

## 2022-07-27 RX ORDER — NALOXONE HYDROCHLORIDE 0.4 MG/ML
0.4 INJECTION, SOLUTION INTRAMUSCULAR; INTRAVENOUS; SUBCUTANEOUS
Status: DISCONTINUED | OUTPATIENT
Start: 2022-07-27 | End: 2022-07-27 | Stop reason: HOSPADM

## 2022-07-27 RX ORDER — FLUMAZENIL 0.1 MG/ML
0.2 INJECTION, SOLUTION INTRAVENOUS
Status: DISCONTINUED | OUTPATIENT
Start: 2022-07-27 | End: 2022-07-27 | Stop reason: HOSPADM

## 2022-07-27 RX ORDER — FENTANYL CITRATE 0.05 MG/ML
50-100 INJECTION, SOLUTION INTRAMUSCULAR; INTRAVENOUS EVERY 5 MIN PRN
Status: DISCONTINUED | OUTPATIENT
Start: 2022-07-27 | End: 2022-07-27 | Stop reason: HOSPADM

## 2022-07-27 RX ORDER — ATROPINE SULFATE 0.1 MG/ML
1 INJECTION INTRAVENOUS
Status: DISCONTINUED | OUTPATIENT
Start: 2022-07-27 | End: 2022-07-27 | Stop reason: HOSPADM

## 2022-07-27 RX ADMIN — MIDAZOLAM HYDROCHLORIDE 1 MG: 1 INJECTION, SOLUTION INTRAMUSCULAR; INTRAVENOUS at 09:41

## 2022-07-27 RX ADMIN — TOPICAL ANESTHETIC 0.5 ML: 200 SPRAY DENTAL; PERIODONTAL at 09:43

## 2022-07-27 RX ADMIN — FENTANYL CITRATE 50 MCG: 50 INJECTION INTRAMUSCULAR; INTRAVENOUS at 09:40

## 2022-07-27 NOTE — LETTER
July 8, 2022      Alexander R Tima  [address]  [place] [address]         Thank you for choosing [facility]. You are scheduled for the following service(s).   Please be aware that coverage of these services is subject to the terms and limitations of your health insurance plan.  Call member services at your health plan with any benefit or coverage questions.    Date:   Wednesday July 27, 2022      Procedure: UPPER ENDOSCOPY-EGD  Doctor: Dr Ramirez           Arrival Time:  0815    *Enter and check in at the Main Hospital Entrance  Procedure Time: 0900       Location:   [facility]        Endoscopy Department, First Floor *         [address] [place], [place] [address]      [phone] or [phone] (Rodrick) to reschedule          PRE-PROCEDURE CHECKLIST    If you have diabetes, ask your regular doctor for diet and medication restrictions.  If you take any antiplatelet or anticoagulant medications (such as Coumadin, Lovenox, Plavix, etc.) and have not already discussed this, please call your primary physician for advice on holding this medication.  If you take Aspirin, you may continue to do so.  If you are or may be pregnant, please discuss the risks and benefits of this procedure with your doctor.  You must arrange for a ride for the day of your exam. If you fail to arrange transportation with a responsible adult, your procedure will need to be cancelled and rescheduled. Taxi, bus and medical transport are not acceptable unless you have a responsible adult that you know & trust with you. Please arrange for this  to be able to pick you up in our department, approximately one hour after your scheduled procedure, if they are not able to stay with you.      Canceling or rescheduling   If you must cancel or reschedule your appointment, please call [phone] as soon as possible.      Upper Endoscopy or Esophagogastroduodenoscopy (EGD) is a test  performed to evaluate symptoms of persistent abdominal pain, nausea, vomiting and difficulty swallowing. It may also be used to treat various conditions of the upper gastrointestinal tract, such as bleeding, narrowing or abnormal growths.     What happens during an upper endoscopy?  On the day of your procedure, plan to spend up to one and a half hour after your arrival at the endoscopy center. The exam itself takes about 5 to 10 minutes.    Before the exam:  - You will change into a gown.   - Your medical history and medication list will be reviewed with you, unless it has already been done over the phone.   - A nurse will insert an intravenous (IV) line into your hand or arm.  - The doctor will talk to you and give you a consent form to sign.    During the exam:  - Medicine will be given through the IV line to help you relax and feel comfortable.   - Your heart rate and oxygen levels will be monitored. If your blood pressure is low, you may be given fluids through the IV line.   - The doctor will insert a flexible, hollow tube, called an endoscope, into your mouth and will advance it slowly through the esophagus, stomach and duodenum (the first part of your small intestine).   - You may have a feeling of pressure or fullness.   - If you have difficulty swallowing, and the doctor finds a narrowing in your esophagus, it may be possible for the area to be expanded-dilated during the exam.   - If abnormal tissue is found, the doctor may remove it through the endoscope (biopsy it) for closer examination. The tissue removal is painless.    After the exam:  - Any tissue samples removed during the exam will be sent to a lab for evaluation. It may take 5 to 7 working days for you to be notified of the results  - The doctor will prepare a full report for the physician who referred you for the upper endoscopy.   - The doctor will talk with you about the initial results of your exam.   - You may feel bloated after the  procedure. That is normal and should not last long.   - Your throat may feel sore for a short time.   - Following the exam, you may resume your normal diet. Avoid alcohol until the next day.   - You may resume your regular activities the day after the procedure.   - Medication given during the exam will prohibit you from driving for the rest of the day.  - A nurse will provide you with complete discharge instructions before you leave the endoscopy center. Be sure to ask the nurse for specific instructions if you take blood thinners such as Aspirin , Coumadin , Lovenox , Plavix , etc.       PREPARATION    To ensure a successful exam, please follow all instructions carefully.      The night before your exam:    STOP eating solid foods at 11:45 pm.     Clear liquids are okay to drink (examples: Gatorade , apple juice, clear broth,coffee or tea without milk or cream, etc.).     DO NOT drink red liquids or alcoholic beverages.    The day of your exam:    STOP drinking clear liquids 4 hours before your exam.     You may take your usual medications with 4 oz. of water, but it needs to be at least 4 hours prior to your procedure.    When you leave for the procedure:    Bring a list of all of your current medications, including any allergy or over-the-counter medications, unless you have already reviewed that with an Endoscopy RN over the phone.     Bring a photo ID as well as up-to-date insurance information, such as your insurance card and any referral forms that might be required by your payer.       DIRECTIONS TO THE ENDOSCOPY DEPARTMENT    From the north ([facility])  Take [address] South, exit on [address] . Get into the left hand paco, turn left (east), go one-half mile to [address] and turn left. Go north to the second stoplight, take a right on [address] and follow it to the Main Hospital entrance.  From the south ([facility])  Take 35N to the [address]E split and exit on  [address] . On [address] , turn left (west) to [address]. Turn right (north) on [address]. Go north to the second stoplight, take a right on [address] and follow it to the Main Hospital entrance.  From the east via [address] ([facility])  Take [address] south to [place] [address] exit. Turn right on [address] . Go west to [address]. Turn right (north) on [address]. Go to the second stoplight, take a right on [address] to the Main Hospital entrance.  From the east via [address] ([facility])  Take [address] to [address]. Turn left (south) on [address] to [address], turn left (east) on [address] and follow it to the Main Hospital entrance.    From the west via [address] ([place], [place])  Take [address] east to [address]. Turn right (south) on [address] to [address], turn left (east) on [address] and follow it to the Main Hospital entrance.

## 2022-07-27 NOTE — H&P
Pre-Endoscopy History and Physical     Alexander Alfred MRN# [id number]   YOB: 1945 Age: 77 year old     Date of Procedure: 7/27/2022  Primary care provider: Messi Saldana  Type of Endoscopy: Gastroscopy with possible biopsy, possible dilation  Reason for Procedure: Verde's  Type of Anesthesia Anticipated: Conscious Sedation    HPI:    Alexander is a 77 year old male who will be undergoing the above procedure.      A history and physical has been performed. The patient's medications and allergies have been reviewed. The risks and benefits of the procedure and the sedation options and risks were discussed with the patient.  All questions were answered and informed consent was obtained.      He denies a personal or family history of anesthesia complications or bleeding disorders.     Patient Active Problem List   Diagnosis     Nasal polyps     Asthma, mild intermittent     Sleep apnea     Hyperlipidemia LDL goal <130     Hypertension goal BP (blood pressure) < 140/90     EE (eosinophilic esophagitis)     Osteoarthritis of left hip     Lumbar facet arthropathy     Lumbar degenerative disc disease     Lumbar pain     Left lumbosacral radiculopathy     Spondylolisthesis of lumbar region     Lumbar foraminal stenosis     Central spinal stenosis     Spinal stenosis of lumbar region at multiple levels     Acquired spondylolisthesis     Closed traumatic nondisplaced fracture of distal end of left fibula     Left ankle pain     Ankle fracture     Advanced directives, counseling/discussion     Lumbar stenosis     Abnormal joint on examination     Allergic rhinitis     Carpal tunnel syndrome     HNP (herniated nucleus pulposus), thoracic     Memory loss     Ostium secundum type atrial septal defect     Verde's esophagus        Past Medical History:   Diagnosis Date     Asthma, mild intermittent     [place] asthma     Verde's esophagus 06/2020    Dr Ramirez, EGD due 1 yr     EE (eosinophilic esophagitis)  08/2013    Dr Ramirez     Hyperlipidemia LDL goal <130      Hypertension goal BP (blood pressure) < 140/90      Lumbar degenerative disc disease      Lumbar stenosis     Dr Knight - Calf weakness/atrophy     Nasal polyps 2004    approx 2/year - increased sx from nasal polyps - prednisone 40 mg x 2 days, 20 mg x 2 days, 10mg x 10 days with excellent results     Osteoarthritis of left hip      Other chronic pain      Sensorineural hearing loss (SNHL) of both ears     Dr Hidalgo - R>L     Sleep apnea 2006    moderate - CPAP - 5-15 cm        Past Surgical History:   Procedure Laterality Date     BUNIONECTOMY LAPIDUS WITH TARSAL METATARSAL (TMT) FUSION Right 2/16/2022    Procedure: 1.  Cheilectomy, right foot. 2.  [place] first metatarsal bunionectomy, right foot. 3.  Debridement of joint, right first metatarsophalangeal joint.;  Surgeon: Chato Jeong DPM;  Location: [facility] OR     COLONOSCOPY  2010    normal - due 2020     COLONOSCOPY N/A 10/21/2020    normal, consider 10 yrs     DECOMPRESSION, FUSION LUMBAR POSTERIOR THREE + LEVELS, COMBINED N/A 10/07/2014    Dr Knight - COMBINED DECOMPRESSION, FUSION LUMBAR POSTERIOR THREE + LEVELS     ESOPHAGOSCOPY, GASTROSCOPY, DUODENOSCOPY (EGD), COMBINED  08/07/2013    Dr Ramirez - EE, non erosive gastropathy     ESOPHAGOSCOPY, GASTROSCOPY, DUODENOSCOPY (EGD), COMBINED N/A 06/02/2021    Verde's due 1 yr     ROTATOR CUFF REPAIR RT/LT Left 2005    Lt shoulder - dr armaan lazar     ROTATOR CUFF REPAIR RT/LT Left 05/2009    Lt shoulder - dr armaan lazar     stress echo  07/2011    normal     STRESS ECHO (METRO)  06/2015    normal stress echo     SURGICAL HISTORY OF -   1982    Lt ankle CRIF     SURGICAL HISTORY OF -   04/2008    blephoplasty     SURGICAL HISTORY OF -   11/2016    T12-L1 - Decompression - Dr Knight -      TONSILLECTOMY  1950       Social History     Tobacco Use     Smoking status: Never Smoker     Smokeless tobacco: Never Used   Substance Use Topics      Alcohol use: Yes     Comment: 1 drinks per day avg       Family History   Problem Relation Age of Onset     Hypertension Father      C.A.D. Father         smoker     C.A.D. Cousin         cousin - at 45     Hypertension Maternal Grandfather      C.A.D. Maternal Grandfather      Coronary Artery Disease Maternal Uncle      Coronary Artery Disease Maternal Uncle      Colon Cancer No family hx of        Prior to Admission medications    Medication Sig Start Date End Date Taking? Authorizing Provider   albuterol (PROAIR HFA/PROVENTIL HFA/VENTOLIN HFA) 108 (90 Base) MCG/ACT inhaler Inhale 2 puffs into the lungs every 6 hours as needed for shortness of breath / dyspnea or wheezing 8/16/21  Yes Messi Saldana MD   aspirin 81 MG chewable tablet Take 1 tablet (81 mg) by mouth daily 7/1/16  Yes Messi Saldana MD   Coenzyme Q10 (COQ-10) 100 MG CAPS Take 100 mg by mouth daily   Yes Unknown, Entered By History   fluticasone (FLONASE) 50 MCG/ACT nasal spray Spray 2 sprays into both nostrils daily 8/16/21  Yes Messi Saldana MD   ibuprofen (ADVIL/MOTRIN) 600 MG tablet Take 600mg of ibuprofen every 6 hours x 7 days to assist in pain control.  If you are not tolerating the medication, you are ok to stop. 2/16/22  Yes Chato Jeong DPM   KRILL OIL PO Take 1 capsule by mouth daily Taking MegaRed OTC. Strength unknown.   Yes Unknown, Entered By History   losartan-hydrochlorothiazide (HYZAAR) 50-12.5 MG tablet Take 1 tablet by mouth daily 6/9/22  Yes Messi Saldana MD   Multiple Vitamin (MULTIVITAMIN OR) Take 1 tablet by mouth daily    Yes Reported, Patient   omeprazole (PRILOSEC) 20 MG DR capsule Take 20 mg by mouth 2 times daily 12/16/21  Yes Reported, Patient   Probiotic Product (4X PROBIOTIC) TABS  8/3/18  Yes Messi Saldana MD   vitamin D3 (CHOLECALCIFEROL) 50 mcg (2000 units) tablet Take 1 tablet (50 mcg) by mouth 2 times daily 2/16/22  Yes Chato Jeong DPM   HYDROcodone-acetaminophen (NORCO) 5-325 MG tablet Take 1-2 tablets  "by mouth every 6 hours as needed for moderate to severe pain .  Do not take with other Tylenol products, as too much Tylenol can be damaging to your liver  Patient not taking: No sig reported 2/16/22   Chato Jeong DPM   hydrOXYzine (ATARAX) 10 MG tablet Take 1-2 tablets every 4-6 hours as needed for pain control.  Patient not taking: No sig reported 2/16/22   Chato Jeong DPM       Allergies   Allergen Reactions     Codeine GI Disturbance     Hydrocodone GI Disturbance     Oxycodone GI Disturbance        REVIEW OF SYSTEMS:   5 point ROS negative except as noted above in HPI, including Gen., Resp., CV, GI &  system review.    PHYSICAL EXAM:   BP (!) 160/75   Pulse (!) 49   Temp 97.2  F (36.2  C) (Temporal)   Resp 13   Ht 1.727 m (5' 8\")   Wt 79.4 kg (175 lb)   SpO2 94%   BMI 26.61 kg/m   Estimated body mass index is 26.61 kg/m  as calculated from the following:    Height as of this encounter: 1.727 m (5' 8\").    Weight as of this encounter: 79.4 kg (175 lb).   GENERAL APPEARANCE: alert, and oriented  MENTAL STATUS: alert  AIRWAY EXAM: Mallampatti Class I (visualization of the soft palate, fauces, uvula, anterior and posterior pillars)  RESP: lungs clear to auscultation - no rales, rhonchi or wheezes  CV: regular rates and rhythm  DIAGNOSTICS:    Not indicated    IMPRESSION   ASA Class 2 - Mild systemic disease    PLAN:   Plan for Gastroscopy with possible biopsy, possible dilation. We discussed the risks, benefits and alternatives and the patient wished to proceed.    The above has been forwarded to the consulting provider.      Signed Electronically by: Teo Ramirez MD  July 27, 2022          "

## 2022-07-28 LAB
PATH REPORT.COMMENTS IMP SPEC: NORMAL
PATH REPORT.COMMENTS IMP SPEC: NORMAL
PATH REPORT.FINAL DX SPEC: NORMAL
PATH REPORT.GROSS SPEC: NORMAL
PATH REPORT.MICROSCOPIC SPEC OTHER STN: NORMAL
PATH REPORT.RELEVANT HX SPEC: NORMAL
PHOTO IMAGE: NORMAL

## 2022-09-19 ASSESSMENT — ENCOUNTER SYMPTOMS
DYSURIA: 0
JOINT SWELLING: 0
PARESTHESIAS: 0
NERVOUS/ANXIOUS: 0
COUGH: 0
NAUSEA: 0
WEAKNESS: 0
FEVER: 0
HEARTBURN: 0
FREQUENCY: 1
SORE THROAT: 0
PALPITATIONS: 0
DIZZINESS: 0
ARTHRALGIAS: 0
ABDOMINAL PAIN: 0
HEADACHES: 0
EYE PAIN: 0
CHILLS: 0
SHORTNESS OF BREATH: 0
DIARRHEA: 0
HEMATOCHEZIA: 0
HEMATURIA: 0
CONSTIPATION: 0

## 2022-09-19 ASSESSMENT — ASTHMA QUESTIONNAIRES
QUESTION_1 LAST FOUR WEEKS HOW MUCH OF THE TIME DID YOUR ASTHMA KEEP YOU FROM GETTING AS MUCH DONE AT WORK, SCHOOL OR AT HOME: NONE OF THE TIME
QUESTION_2 LAST FOUR WEEKS HOW OFTEN HAVE YOU HAD SHORTNESS OF BREATH: NOT AT ALL
QUESTION_3 LAST FOUR WEEKS HOW OFTEN DID YOUR ASTHMA SYMPTOMS (WHEEZING, COUGHING, SHORTNESS OF BREATH, CHEST TIGHTNESS OR PAIN) WAKE YOU UP AT NIGHT OR EARLIER THAN USUAL IN THE MORNING: NOT AT ALL
QUESTION_4 LAST FOUR WEEKS HOW OFTEN HAVE YOU USED YOUR RESCUE INHALER OR NEBULIZER MEDICATION (SUCH AS ALBUTEROL): NOT AT ALL
QUESTION_5 LAST FOUR WEEKS HOW WOULD YOU RATE YOUR ASTHMA CONTROL: COMPLETELY CONTROLLED
ACT_TOTALSCORE: 25

## 2022-09-19 ASSESSMENT — ACTIVITIES OF DAILY LIVING (ADL): CURRENT_FUNCTION: NO ASSISTANCE NEEDED

## 2022-09-26 ENCOUNTER — OFFICE VISIT (OUTPATIENT)
Dept: FAMILY MEDICINE | Facility: CLINIC | Age: 77
End: 2022-09-26
Payer: COMMERCIAL

## 2022-09-26 VITALS
RESPIRATION RATE: 18 BRPM | HEIGHT: 68 IN | DIASTOLIC BLOOD PRESSURE: 70 MMHG | BODY MASS INDEX: 25.61 KG/M2 | HEART RATE: 69 BPM | SYSTOLIC BLOOD PRESSURE: 130 MMHG | OXYGEN SATURATION: 96 % | WEIGHT: 169 LBS | TEMPERATURE: 97.7 F

## 2022-09-26 DIAGNOSIS — M47.816 LUMBAR FACET ARTHROPATHY: ICD-10-CM

## 2022-09-26 DIAGNOSIS — J45.20 MILD INTERMITTENT ASTHMA WITHOUT COMPLICATION: ICD-10-CM

## 2022-09-26 DIAGNOSIS — M48.00 CENTRAL SPINAL STENOSIS: ICD-10-CM

## 2022-09-26 DIAGNOSIS — M48.062 SPINAL STENOSIS OF LUMBAR REGION WITH NEUROGENIC CLAUDICATION: ICD-10-CM

## 2022-09-26 DIAGNOSIS — K20.0 EE (EOSINOPHILIC ESOPHAGITIS): ICD-10-CM

## 2022-09-26 DIAGNOSIS — R20.0 NUMBNESS AND TINGLING IN BOTH HANDS: ICD-10-CM

## 2022-09-26 DIAGNOSIS — R19.7 DIARRHEA OF PRESUMED INFECTIOUS ORIGIN: ICD-10-CM

## 2022-09-26 DIAGNOSIS — G47.33 OBSTRUCTIVE SLEEP APNEA SYNDROME: ICD-10-CM

## 2022-09-26 DIAGNOSIS — E78.5 HYPERLIPIDEMIA LDL GOAL <100: ICD-10-CM

## 2022-09-26 DIAGNOSIS — M54.17 LEFT LUMBOSACRAL RADICULOPATHY: ICD-10-CM

## 2022-09-26 DIAGNOSIS — I10 HYPERTENSION GOAL BP (BLOOD PRESSURE) < 140/90: ICD-10-CM

## 2022-09-26 DIAGNOSIS — R73.03 PREDIABETES: ICD-10-CM

## 2022-09-26 DIAGNOSIS — M54.50 LUMBAR PAIN: ICD-10-CM

## 2022-09-26 DIAGNOSIS — Z00.00 ROUTINE GENERAL MEDICAL EXAMINATION AT A HEALTH CARE FACILITY: Primary | ICD-10-CM

## 2022-09-26 DIAGNOSIS — K22.70 BARRETT'S ESOPHAGUS WITHOUT DYSPLASIA: ICD-10-CM

## 2022-09-26 DIAGNOSIS — F51.01 PRIMARY INSOMNIA: ICD-10-CM

## 2022-09-26 DIAGNOSIS — Z51.81 MEDICATION MONITORING ENCOUNTER: ICD-10-CM

## 2022-09-26 DIAGNOSIS — M51.369 LUMBAR DEGENERATIVE DISC DISEASE: ICD-10-CM

## 2022-09-26 DIAGNOSIS — J33.9 NASAL POLYP: ICD-10-CM

## 2022-09-26 DIAGNOSIS — Z12.11 SCREEN FOR COLON CANCER: ICD-10-CM

## 2022-09-26 DIAGNOSIS — Z00.00 ROUTINE GENERAL MEDICAL EXAMINATION AT A HEALTH CARE FACILITY: ICD-10-CM

## 2022-09-26 DIAGNOSIS — R20.2 NUMBNESS AND TINGLING IN BOTH HANDS: ICD-10-CM

## 2022-09-26 DIAGNOSIS — Z12.5 SCREENING FOR PROSTATE CANCER: ICD-10-CM

## 2022-09-26 DIAGNOSIS — M48.061 LUMBAR FORAMINAL STENOSIS: ICD-10-CM

## 2022-09-26 PROCEDURE — G0439 PPPS, SUBSEQ VISIT: HCPCS | Performed by: FAMILY MEDICINE

## 2022-09-26 PROCEDURE — 99214 OFFICE O/P EST MOD 30 MIN: CPT | Mod: 25 | Performed by: FAMILY MEDICINE

## 2022-09-26 RX ORDER — FLUTICASONE PROPIONATE 50 MCG
2 SPRAY, SUSPENSION (ML) NASAL DAILY
Qty: 48 G | Refills: 3 | Status: SHIPPED | OUTPATIENT
Start: 2022-09-26 | End: 2023-04-11

## 2022-09-26 RX ORDER — LOSARTAN POTASSIUM AND HYDROCHLOROTHIAZIDE 12.5; 5 MG/1; MG/1
1 TABLET ORAL DAILY
Qty: 90 TABLET | Refills: 3 | Status: SHIPPED | OUTPATIENT
Start: 2022-09-26 | End: 2023-04-11

## 2022-09-26 RX ORDER — ALBUTEROL SULFATE 90 UG/1
2 AEROSOL, METERED RESPIRATORY (INHALATION) EVERY 6 HOURS PRN
Qty: 18 G | Refills: 3 | Status: SHIPPED | OUTPATIENT
Start: 2022-09-26 | End: 2022-09-27

## 2022-09-26 ASSESSMENT — ENCOUNTER SYMPTOMS
PARESTHESIAS: 0
SORE THROAT: 0
CONSTIPATION: 0
JOINT SWELLING: 0
NERVOUS/ANXIOUS: 0
DIARRHEA: 0
COUGH: 0
ABDOMINAL PAIN: 0
HEMATOCHEZIA: 0
SHORTNESS OF BREATH: 0
FEVER: 0
DYSURIA: 0
WEAKNESS: 0
FREQUENCY: 1
DIZZINESS: 0
HEMATURIA: 0
PALPITATIONS: 0
NAUSEA: 0
ARTHRALGIAS: 0
CHILLS: 0
HEARTBURN: 0
HEADACHES: 0
EYE PAIN: 0

## 2022-09-26 ASSESSMENT — ASTHMA QUESTIONNAIRES: ACT_TOTALSCORE: 25

## 2022-09-26 ASSESSMENT — ACTIVITIES OF DAILY LIVING (ADL): CURRENT_FUNCTION: NO ASSISTANCE NEEDED

## 2022-09-26 NOTE — LETTER
My Asthma Action Plan    Name: Alexander Alfred   YOB: 1945  Date: 9/26/2022   My doctor: Messi Saldana MD   My clinic: [facility] [place] [facility]        My Rescue Medicine:   Albuterol inhaler (Proair/Ventolin/Proventil HFA)  2-4 puffs EVERY 4 HOURS as needed. Use a spacer if recommended by your provider.   My Asthma Severity:   Intermittent / Exercise Induced  Know your asthma triggers: no issues             GREEN ZONE   Good Control    I feel good    No cough or wheeze    Can work, sleep and play without asthma symptoms       Take your asthma control medicine every day.     1. If exercise triggers your asthma, take your rescue medication    15 minutes before exercise or sports, and    During exercise if you have asthma symptoms  2. Spacer to use with inhaler: If you have a spacer, make sure to use it with your inhaler             YELLOW ZONE Getting Worse  I have ANY of these:    I do not feel good    Cough or wheeze    Chest feels tight    Wake up at night   1. Keep taking your Green Zone medications  2. Start taking your rescue medicine:    every 20 minutes for up to 1 hour. Then every 4 hours for 24-48 hours.  3. If you stay in the Yellow Zone for more than 12-24 hours, contact your doctor.  4. If you do not return to the Green Zone in 12-24 hours or you get worse, start taking your oral steroid medicine if prescribed by your provider.           RED ZONE Medical Alert - Get Help  I have ANY of these:    I feel awful    Medicine is not helping    Breathing getting harder    Trouble walking or talking    Nose opens wide to breathe       1. Take your rescue medicine NOW  2. If your provider has prescribed an oral steroid medicine, start taking it NOW  3. Call your doctor NOW  4. If you are still in the Red Zone after 20 minutes and you have not reached your doctor:    Take your rescue medicine again and    Call 911 or go to the emergency room right away    See your regular doctor within 2  weeks of an Emergency Room or Urgent Care visit for follow-up treatment.          Annual Reminders:  Meet with Asthma Educator,  Flu Shot in the Fall, consider Pneumonia Vaccination for patients with asthma (aged 19 and older).    Pharmacy:    [facility] - A MAIL ORDER BEATRICEMACJOSE  WRITTEN PRESCRIPTION REQUESTED  [id number] - [place], [place] - [address] AT [place] [address] & [address]    Electronically signed by Messi Saldana MD   Date: 09/26/22                    Asthma Triggers  How To Control Things That Make Your Asthma Worse    Triggers are things that make your asthma worse.  Look at the list below to help you find your triggers and   what you can do about them. You can help prevent asthma flare-ups by staying away from your triggers.      Trigger                                                          What you can do   Cigarette Smoke  Tobacco smoke can make asthma worse. Do not allow smoking in your home, car or around you.  Be sure no one smokes at a child s day care or school.  If you smoke, ask your health care provider for ways to help you quit.  Ask family members to quit too.  Ask your health care provider for a referral to Quit Plan to help you quit smoking, or call 8-179-349-PLAN.     Colds, Flu, Bronchitis  These are common triggers of asthma. Wash your hands often.  Don t touch your eyes, nose or mouth.  Get a flu shot every year.     Dust Mites  These are tiny bugs that live in cloth or carpet. They are too small to see. Wash sheets and blankets in hot water every week.   Encase pillows and mattress in dust mite proof covers.  Avoid having carpet if you can. If you have carpet, vacuum weekly.   Use a dust mask and HEPA vacuum.   Pollen and Outdoor Mold  Some people are allergic to trees, grass, or weed pollen, or molds. Try to keep your windows closed.  Limit time out doors when pollen count is high.   Ask you health care provider about taking medicine during allergy season.      Animal Dander  Some people are allergic to skin flakes, urine or saliva from pets with fur or feathers. Keep pets with fur or feathers out of your home.    If you can t keep the pet outdoors, then keep the pet out of your bedroom.  Keep the bedroom door closed.  Keep pets off cloth furniture and away from stuffed toys.     Mice, Rats, and Cockroaches  Some people are allergic to the waste from these pests.   Cover food and garbage.  Clean up spills and food crumbs.  Store grease in the refrigerator.   Keep food out of the bedroom.   Indoor Mold  This can be a trigger if your home has high moisture. Fix leaking faucets, pipes, or other sources of water.   Clean moldy surfaces.  Dehumidify basement if it is damp and smelly.   Smoke, Strong Odors, and Sprays  These can reduce air quality. Stay away from strong odors and sprays, such as perfume, powder, hair spray, paints, smoke incense, paint, cleaning products, candles and new carpet.   Exercise or Sports  Some people with asthma have this trigger. Be active!  Ask your doctor about taking medicine before sports or exercise to prevent symptoms.    Warm up for 5-10 minutes before and after sports or exercise.     Other Triggers of Asthma  Cold air:  Cover your nose and mouth with a scarf.  Sometimes laughing or crying can be a trigger.  Some medicines and food can trigger asthma.

## 2022-09-26 NOTE — PROGRESS NOTES
"[facility] Marcial Emery is a 77 year old who presents for Preventive Visit.    Patient has been advised of split billing requirements and indicates understanding: Yes     Are you in the first 12 months of your Medicare coverage?  No    Healthy Habits:     In general, how would you rate your overall health?  Excellent    Frequency of exercise:  4-5 days/week    Duration of exercise:  30-45 minutes    Do you usually eat at least 4 servings of fruit and vegetables a day, include whole grains    & fiber and avoid regularly eating high fat or \"junk\" foods?  Yes    Taking medications regularly:  Yes    Medication side effects:  Not applicable    Ability to successfully perform activities of daily living:  No assistance needed    Home Safety:  No safety concerns identified    Hearing Impairment:  Difficulty understanding soft or whispered speech    In the past 6 months, have you been bothered by leaking of urine?  No    In general, how would you rate your overall mental or emotional health?  Good      PHQ-2 Total Score: 0    Additional concerns today:  Yes    Do you feel safe in your environment? Yes    Have you ever done Advance Care Planning? (For example, a Health Directive, POLST, or a discussion with a medical provider or your loved ones about your wishes): Yes, advance care planning is on file.     Fall risk  Fallen 2 or more times in the past year?: No  Any fall with injury in the past year?: No    Cognitive Screening   1) Repeat 3 items (Leader, Season, Table)    2) Clock draw: NORMAL  3) 3 item recall: Recalls 3 objects  Results: NORMAL clock, 1-2 items recalled: COGNITIVE IMPAIRMENT LESS LIKELY    Mini-CogTM Copyright ANITHA Sexton. Licensed by the author for use in [facility]; reprinted with permission ([email or web address]). All rights reserved.      Do you have sleep apnea, excessive snoring or daytime drowsiness?: no    Reviewed and updated as needed this visit by clinical " staff   Tobacco  Allergies  Meds   Med Hx  Surg Hx  Fam Hx  Soc Hx          Reviewed and updated as needed this visit by Provider                   Social History     Tobacco Use     Smoking status: Never Smoker     Smokeless tobacco: Never Used   Substance Use Topics     Alcohol use: Yes     Comment: 1 drinks per day avg     If you drink alcohol do you typically have >3 drinks per day or >7 drinks per week? No    Alcohol Use 9/26/2022   Prescreen: >3 drinks/day or >7 drinks/week? -   Prescreen: >3 drinks/day or >7 drinks/week? No       Do you check your blood pressure regularly outside of the clinic? Infrequent      Are you following a low salt diet? Yes    Are your blood pressures ever more than 140 on the top number (systolic) OR more   than 90 on the bottom number (diastolic), for example 140/90? Does not check    Current providers sharing in care for this patient include:     Patient Care Team:  Messi Saldana MD as PCP - General  Messi Saldana MD as Assigned PCP  Chato Jeong DPM as Assigned Musculoskeletal Provider    The following health maintenance items are reviewed in Epic and correct as of today:    Health Maintenance   Topic Date Due     DTAP/TDAP/TD IMMUNIZATION (4 - Td or Tdap) 06/21/2022     LIPID  08/16/2022     MICROALBUMIN  08/16/2022     PSA  08/16/2022     INFLUENZA VACCINE (1) 09/01/2022     COVID-19 Vaccine (4 - Booster for Mahi series) 09/19/2022     BMP  12/24/2022     ASTHMA CONTROL TEST  03/26/2023     MEDICARE ANNUAL WELLNESS VISIT  09/26/2023     ANNUAL REVIEW OF HM ORDERS  09/26/2023     ASTHMA ACTION PLAN  09/26/2023     FALL RISK ASSESSMENT  09/26/2023     ADVANCE CARE PLANNING  09/26/2027     COLORECTAL CANCER SCREENING  10/21/2030     HEPATITIS C SCREENING  Completed     PHQ-2 (once per calendar year)  Completed     Pneumococcal Vaccine: 65+ Years  Completed     ZOSTER IMMUNIZATION  Completed     IPV IMMUNIZATION  Aged Out     MENINGITIS IMMUNIZATION  Aged Out      HEPATITIS B IMMUNIZATION  Aged Out     Review of Systems   Constitutional: Negative for chills and fever.   HENT: Negative for congestion, ear pain, hearing loss and sore throat.    Eyes: Negative for pain and visual disturbance.   Respiratory: Negative for cough and shortness of breath.    Cardiovascular: Negative for chest pain, palpitations and peripheral edema.   Gastrointestinal: Negative for abdominal pain, constipation, diarrhea, heartburn, hematochezia and nausea.   Genitourinary: Positive for frequency and urgency. Negative for dysuria, genital sores, hematuria and impotence.   Musculoskeletal: Negative for arthralgias and joint swelling.   Skin: Negative for rash.   Neurological: Negative for dizziness, weakness, headaches and paresthesias.   Psychiatric/Behavioral: Negative for mood changes. The patient is not nervous/anxious.      Prediabetes    Lab Results   Component Value Date    A1C 5.5 12/24/2021    A1C 5.2 08/16/2021    A1C 5.3 09/09/2020    A1C 5.3 06/15/2015     Htn      Do you check your blood pressure regularly outside of the clinic? Infrequent      Are you following a low salt diet? Yes    Are your blood pressures ever more than 140 on the top number (systolic) OR more   than 90 on the bottom number (diastolic), for example 140/90? Does not check    BP Readings from Last 3 Encounters:   09/26/22 130/70   07/27/22 (!) 165/79   04/26/22 (!) 140/66     Lipids    Recent Labs   Lab Test 08/16/21  0919 09/09/20  0845 08/22/16  0939 06/15/15  1002   CHOL 159 161   < > 191   HDL 55 58   < > 58   LDL 82 79   < > 105   TRIG 111 120   < > 140   CHOLHDLRATIO  --   --   --  3.3    < > = values in this interval not displayed.     GERD / Verde's - Dr Ramirez - omeprazole - annual EGD, next due 7/2023    AMBER - using CPAP every night - has new Coleman    Asthma - ACT = 25 - no issues    Numbness / Tingling - R>L hand - worse at night - digits 1-3    Diarrhea x 3 days - no f/c/s - no abx - no travel -  recent funerals x 2 - up to 5 times daily - doing the BRAT diet - cottage cheese - pepto bismol, with some help, feels like improving, no n/v - stools dark brown/black since pepto bismol    Difficulty with staying asleep - melatonin 10 mg - sleeping 6 hrs per night - no issues getting to sleep    Low back - stable - decreased size calf - Dr Knight    Health Maintenance     Colonoscopy:  Per Dr Ramirez, consider 10/2030   FIT:  consider              PSA:  pending   DEXA:  NA    Health Maintenance Due   Topic Date Due     DTAP/TDAP/TD IMMUNIZATION (4 - Td or Tdap) 06/21/2022     LIPID  08/16/2022     MICROALBUMIN  08/16/2022     PSA  08/16/2022     INFLUENZA VACCINE (1) 09/01/2022     COVID-19 Vaccine (4 - Booster for Mahi series) 09/19/2022       Current Problem List    Patient Active Problem List   Diagnosis     Nasal polyps     Asthma, mild intermittent     Sleep apnea     Hyperlipidemia LDL goal <130     Hypertension goal BP (blood pressure) < 140/90     EE (eosinophilic esophagitis)     Osteoarthritis of left hip     Lumbar facet arthropathy     Lumbar degenerative disc disease     Lumbar pain     Left lumbosacral radiculopathy     Spondylolisthesis of lumbar region     Lumbar foraminal stenosis     Central spinal stenosis     Spinal stenosis of lumbar region at multiple levels     Acquired spondylolisthesis     Closed traumatic nondisplaced fracture of distal end of left fibula     Left ankle pain     Ankle fracture     Advanced directives, counseling/discussion     Lumbar stenosis     Abnormal joint on examination     Allergic rhinitis     Carpal tunnel syndrome     HNP (herniated nucleus pulposus), thoracic     Memory loss     Ostium secundum type atrial septal defect     Verde's esophagus       Past Medical History    Past Medical History:   Diagnosis Date     Asthma, mild intermittent     [place] asthma     Verde's esophagus 06/2020    Dr Ramirez, EGD due 1 yr     EE (eosinophilic esophagitis)  08/2013    Dr Ramirez     Hyperlipidemia LDL goal <130      Hypertension goal BP (blood pressure) < 140/90      Lumbar degenerative disc disease      Lumbar stenosis     Dr Knight - Calf weakness/atrophy     Nasal polyps 2004    approx 2/year - increased sx from nasal polyps - prednisone 40 mg x 2 days, 20 mg x 2 days, 10mg x 10 days with excellent results     Osteoarthritis of left hip      Other chronic pain      Sensorineural hearing loss (SNHL) of both ears     Dr Hidalgo - R>L     Sleep apnea 2006    moderate - CPAP - 5-15 cm       Past Surgical History    Past Surgical History:   Procedure Laterality Date     BUNIONECTOMY LAPIDUS WITH TARSAL METATARSAL (TMT) FUSION Right 2/16/2022    Procedure: 1.  Cheilectomy, right foot. 2.  [place] first metatarsal bunionectomy, right foot. 3.  Debridement of joint, right first metatarsophalangeal joint.;  Surgeon: Chato Jeong DPM;  Location: [facility] OR     COLONOSCOPY  2010    normal - due 2020     COLONOSCOPY N/A 10/21/2020    normal, consider 10 yrs     DECOMPRESSION, FUSION LUMBAR POSTERIOR THREE + LEVELS, COMBINED N/A 10/07/2014    Dr Knight - COMBINED DECOMPRESSION, FUSION LUMBAR POSTERIOR THREE + LEVELS     ESOPHAGOSCOPY, GASTROSCOPY, DUODENOSCOPY (EGD), COMBINED  08/07/2013    Dr Ramirez - EE, non erosive gastropathy     ESOPHAGOSCOPY, GASTROSCOPY, DUODENOSCOPY (EGD), COMBINED N/A 06/02/2021    Verde's due 1 yr     ESOPHAGOSCOPY, GASTROSCOPY, DUODENOSCOPY (EGD), COMBINED N/A 7/27/2022    Procedure: ESOPHAGOGASTRODUODENOSCOPY (EGD) (fv) with biopsy by using cold forcep;  Surgeon: Teo Ramirez MD;  Location:  GI     ROTATOR CUFF REPAIR RT/LT Left 2005    Lt shoulder - dr armaan lazar     ROTATOR CUFF REPAIR RT/LT Left 05/2009    Lt shoulder - dr armaan lazar     stress echo  07/2011    normal     STRESS ECHO (METRO)  06/2015    normal stress echo     SURGICAL HISTORY OF -   1982    Lt ankle CRIF     SURGICAL HISTORY OF -   04/2008     blephoplasty     SURGICAL HISTORY OF -   11/2016    T12-L1 - Decompression - Dr Knight -      TONSILLECTOMY  1950       Current Medications    Current Outpatient Medications   Medication Sig Dispense Refill     albuterol (PROAIR HFA/PROVENTIL HFA/VENTOLIN HFA) 108 (90 Base) MCG/ACT inhaler Inhale 2 puffs into the lungs every 6 hours as needed for shortness of breath / dyspnea or wheezing 18 g 3     aspirin 81 MG chewable tablet Take 1 tablet (81 mg) by mouth daily 108 tablet 3     Coenzyme Q10 (COQ-10) 100 MG CAPS Take 100 mg by mouth daily       fluticasone (FLONASE) 50 MCG/ACT nasal spray Spray 2 sprays into both nostrils daily 48 g 3     ibuprofen (ADVIL/MOTRIN) 600 MG tablet Take 600mg of ibuprofen every 6 hours x 7 days to assist in pain control.  If you are not tolerating the medication, you are ok to stop. 28 tablet 0     KRILL OIL PO Take 1 capsule by mouth daily Taking MegaRed OTC. Strength unknown.       losartan-hydrochlorothiazide (HYZAAR) 50-12.5 MG tablet Take 1 tablet by mouth daily 90 tablet 3     Multiple Vitamin (MULTIVITAMIN OR) Take 1 tablet by mouth daily        omeprazole (PRILOSEC) 20 MG DR capsule Take 1 capsule (20 mg) by mouth 2 times daily 180 capsule 3     Probiotic Product (4X PROBIOTIC) TABS        vitamin D3 (CHOLECALCIFEROL) 50 mcg (2000 units) tablet Take 1 tablet (50 mcg) by mouth 2 times daily 180 tablet 0       Allergies    Allergies   Allergen Reactions     Codeine GI Disturbance     Hydrocodone GI Disturbance     Oxycodone GI Disturbance       Immunizations    Immunization History   Administered Date(s) Administered     COVID-19,PF,Mahi 03/11/2021, 12/03/2021     COVID-19,PF,Moderna 07/25/2022     DT (PEDS <7y) 04/03/1998     FLUAD(HD)65+ QUAD 12/03/2021     Flu, Unspecified 11/29/1999, 12/31/2001, 10/23/2006     Influenza (H1N1) 12/07/2009     Influenza (High Dose) 3 valent vaccine 10/31/2011, 01/08/2013, 09/24/2014, 12/03/2015, 10/11/2016, 11/09/2017     Influenza (IIV3) PF  11/01/2013     Influenza (intradermal) 10/10/2018     Influenza Vaccine IM > 6 months Valent IIV4 (Alfuria,Fluzone) 10/12/2013, 09/24/2014     Influenza Vaccine IM Ages 6-35 Months 4 Valent (PF) 10/12/2013     Influenza, Quad, High Dose, Pf, 65yr+ (Fluzone HD) 09/09/2020     Pneumo Conj 13-V (2010&after) 06/15/2015     Pneumococcal 23 valent 10/31/2011     TD (ADULT, 7+) 01/01/2004, 06/21/2012     TDAP Vaccine (Boostrix) 06/21/2012     Tdap (Adacel,Boostrix) 06/12/2006     Zoster vaccine recombinant adjuvanted (SHINGRIX) 11/25/2020, 02/25/2021     Zoster vaccine, live 12/03/2015       Family History    Family History   Problem Relation Age of Onset     Hypertension Father      C.A.D. Father         smoker     Other Cancer Father         life long smoker     C.A.D. Cousin         cousin - at 45     Hypertension Maternal Grandfather      C.A.D. Maternal Grandfather      Coronary Artery Disease Maternal Uncle      Coronary Artery Disease Maternal Uncle      Colon Cancer No family hx of        Social History    Social History     Socioeconomic History     Marital status:      Spouse name: Valeria     Number of children: 3     Years of education: 16     Highest education level: Not on file   Occupational History     Employer: [employer]   Tobacco Use     Smoking status: Never Smoker     Smokeless tobacco: Never Used   Vaping Use     Vaping Use: Never used   Substance and Sexual Activity     Alcohol use: Yes     Comment: 1 drinks per day avg     Drug use: No     Sexual activity: Yes     Partners: Female     Birth control/protection: Male Surgical   Other Topics Concern     Parent/sibling w/ CABG, MI or angioplasty before 65F 55M? Yes      Service Not Asked     Blood Transfusions Not Asked     Caffeine Concern Yes     Comment: 2-3 cups daily     Occupational Exposure Not Asked     Hobby Hazards Not Asked     Sleep Concern Not Asked     Stress Concern Not Asked     Weight Concern Not Asked     Special Diet Not  "Asked     Back Care Not Asked     Exercise Yes     Comment: 3+/wk, landscaping, Mtn Bike riding     Bike Helmet Not Asked     Seat Belt Yes     Self-Exams Not Asked   Social History Narrative     Not on file     Social Determinants of Health     Financial Resource Strain: Not on file   Food Insecurity: Not on file   Transportation Needs: Not on file   Physical Activity: Not on file   Stress: Not on file   Social Connections: Not on file   Intimate Partner Violence: Not on file   Housing Stability: Not on file       ROS    CONSTITUTIONAL: NEGATIVE for fever, chills, change in weight  INTEGUMENTARY/SKIN: NEGATIVE for worrisome rashes, moles or lesions  EYES: NEGATIVE for vision changes or irritation  ENT/MOUTH: NEGATIVE for ear, mouth and throat problems  RESP: NEGATIVE for significant cough or SOB  BREAST: NEGATIVE for masses, tenderness or discharge  CV: NEGATIVE for chest pain, palpitations or peripheral edema  GI: NEGATIVE for nausea, abdominal pain, heartburn, or change in bowel habits  : NEGATIVE for frequency, dysuria, or hematuria  MUSCULOSKELETAL: NEGATIVE for significant arthralgias or myalgia  NEURO: NEGATIVE for weakness, dizziness or paresthesias  ENDOCRINE: NEGATIVE for temperature intolerance, skin/hair changes  HEME: NEGATIVE for bleeding problems  PSYCHIATRIC: NEGATIVE for changes in mood or affect    OBJECTIVE    /70   Pulse 69   Temp 97.7  F (36.5  C) (Tympanic)   Resp 18   Ht 1.727 m (5' 8\")   Wt 76.7 kg (169 lb)   SpO2 96%   BMI 25.70 kg/m      EXAM:    GENERAL: healthy, alert and no distress  EYES: Eyes grossly normal to inspection, PERRL and conjunctivae and sclerae normal  HENT: ear canals and TM's normal, nose and mouth without ulcers or lesions  NECK: no adenopathy, no asymmetry, masses, or scars and thyroid normal to palpation  RESP: lungs clear to auscultation - no rales, rhonchi or wheezes  CV: regular rate and rhythm, normal S1 S2, no S3 or S4, no murmur, click or rub, no " peripheral edema and peripheral pulses strong  ABDOMEN: soft, nontender, no hepatosplenomegaly, no masses and bowel sounds normal   (male): testicles normal without atrophy or masses, no hernias and penis normal without urethral discharge  RECTAL: deferred  MS: no gross musculoskeletal defects noted, no edema  SKIN: no suspicious lesions or rashes  NEURO: Normal strength and tone, mentation intact and speech normal  PSYCH: mentation appears normal, affect normal/bright  LYMPH: no cervical, supraclavicular, axillary, or inguinal adenopathy    DIAGNOSTICS/PROCEDURES    Pending    ASSESSMENT      ICD-10-CM    1. Routine general medical examination at a health care facility  Z00.00 REVIEW OF HEALTH MAINTENANCE PROTOCOL ORDERS     Comprehensive metabolic panel     Lipid panel reflex to direct LDL Fasting     CBC with platelets     CK total     UA reflex to Microscopic and Culture     Albumin Random Urine Quantitative with Creat Ratio     TSH with free T4 reflex     Prostate Specific Antigen Screen     Fecal colorectal cancer screen FIT     Hemoglobin A1c     Asthma Action Plan (AAP)     EMG     omeprazole (PRILOSEC) 20 MG DR capsule     losartan-hydrochlorothiazide (HYZAAR) 50-12.5 MG tablet     fluticasone (FLONASE) 50 MCG/ACT nasal spray     albuterol (PROAIR HFA/PROVENTIL HFA/VENTOLIN HFA) 108 (90 Base) MCG/ACT inhaler   2. Numbness and tingling in both hands  R20.0 REVIEW OF HEALTH MAINTENANCE PROTOCOL ORDERS    R20.2 Comprehensive metabolic panel     TSH with free T4 reflex     OFFICE/OUTPT VISIT,EST,LEVL IV   3. Diarrhea of presumed infectious origin  R19.7 REVIEW OF HEALTH MAINTENANCE PROTOCOL ORDERS     Comprehensive metabolic panel     TSH with free T4 reflex     OFFICE/OUTPT VISIT,EST,LEVL IV   4. Primary insomnia  F51.01 REVIEW OF HEALTH MAINTENANCE PROTOCOL ORDERS     Comprehensive metabolic panel     TSH with free T4 reflex     OFFICE/OUTPT VISIT,EST,LEVL IV   5. Prediabetes  R73.03 REVIEW OF HEALTH  MAINTENANCE PROTOCOL ORDERS     Comprehensive metabolic panel     Lipid panel reflex to direct LDL Fasting     UA reflex to Microscopic and Culture     Albumin Random Urine Quantitative with Creat Ratio     TSH with free T4 reflex     Hemoglobin A1c     OFFICE/OUTPT VISIT,EST,LEVL IV     losartan-hydrochlorothiazide (HYZAAR) 50-12.5 MG tablet   6. Hypertension goal BP (blood pressure) < 140/90  I10 REVIEW OF HEALTH MAINTENANCE PROTOCOL ORDERS     Comprehensive metabolic panel     UA reflex to Microscopic and Culture     Albumin Random Urine Quantitative with Creat Ratio     TSH with free T4 reflex     OFFICE/OUTPT VISIT,EST,LEVL IV     losartan-hydrochlorothiazide (HYZAAR) 50-12.5 MG tablet   7. Hyperlipidemia LDL goal <100  E78.5 REVIEW OF HEALTH MAINTENANCE PROTOCOL ORDERS     Comprehensive metabolic panel     Lipid panel reflex to direct LDL Fasting     CK total     TSH with free T4 reflex     OFFICE/OUTPT VISIT,EST,LEVL IV   8. 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  K22.70 REVIEW OF HEALTH MAINTENANCE PROTOCOL ORDERS     CBC with platelets     OFFICE/OUTPT VISIT,EST,LEVL IV     omeprazole (PRILOSEC) 20 MG DR capsule   9. EE (eosinophilic esophagitis)  K20.0 REVIEW OF HEALTH MAINTENANCE PROTOCOL ORDERS     CBC with platelets     OFFICE/OUTPT VISIT,EST,LEVL IV     omeprazole (PRILOSEC) 20 MG DR capsule   10. Obstructive sleep apnea syndrome  G47.33 REVIEW OF HEALTH MAINTENANCE PROTOCOL ORDERS     TSH with free T4 reflex     OFFICE/OUTPT VISIT,EST,LEVL IV     fluticasone (FLONASE) 50 MCG/ACT nasal spray   11. Mild intermittent asthma without complication  J45.20 REVIEW OF HEALTH MAINTENANCE PROTOCOL ORDERS     Asthma Action Plan (AAP)     OFFICE/OUTPT VISIT,EST,LEVL IV     albuterol (PROAIR HFA/PROVENTIL HFA/VENTOLIN HFA) 108 (90 Base) MCG/ACT inhaler   12. Spinal stenosis of lumbar region with neurogenic claudication  M48.062 REVIEW OF HEALTH MAINTENANCE PROTOCOL ORDERS     OFFICE/OUTPT VISIT,EST,LEVL IV   13.  Lumbar foraminal stenosis  M48.061 REVIEW OF HEALTH MAINTENANCE PROTOCOL ORDERS     OFFICE/OUTPT VISIT,EST,LEVL IV   14. Central spinal stenosis  M48.00 REVIEW OF HEALTH MAINTENANCE PROTOCOL ORDERS     OFFICE/OUTPT VISIT,EST,LEVL IV   15. Left lumbosacral radiculopathy  M54.17 REVIEW OF HEALTH MAINTENANCE PROTOCOL ORDERS     OFFICE/OUTPT VISIT,EST,LEVL IV   16. Lumbar pain  M54.50 REVIEW OF HEALTH MAINTENANCE PROTOCOL ORDERS     OFFICE/OUTPT VISIT,EST,LEVL IV   17. Lumbar facet arthropathy  M47.816 REVIEW OF HEALTH MAINTENANCE PROTOCOL ORDERS     OFFICE/OUTPT VISIT,EST,LEVL IV   18. Lumbar degenerative disc disease  M51.36 REVIEW OF HEALTH MAINTENANCE PROTOCOL ORDERS     OFFICE/OUTPT VISIT,EST,LEVL IV   19. Nasal polyps  J33.9 REVIEW OF HEALTH MAINTENANCE PROTOCOL ORDERS     OFFICE/OUTPT VISIT,EST,LEVL IV     fluticasone (FLONASE) 50 MCG/ACT nasal spray   20. Screening for prostate cancer  Z12.5 REVIEW OF HEALTH MAINTENANCE PROTOCOL ORDERS     Prostate Specific Antigen Screen     OFFICE/OUTPT VISIT,EST,LEVL IV   21. Screen for colon cancer  Z12.11 REVIEW OF HEALTH MAINTENANCE PROTOCOL ORDERS     Fecal colorectal cancer screen FIT     OFFICE/OUTPT VISIT,EST,LEVL IV   22. Medication monitoring encounter  Z51.81 REVIEW OF HEALTH MAINTENANCE PROTOCOL ORDERS     Comprehensive metabolic panel     Lipid panel reflex to direct LDL Fasting     CBC with platelets     CK total     UA reflex to Microscopic and Culture     Albumin Random Urine Quantitative with Creat Ratio     TSH with free T4 reflex     Hemoglobin A1c     OFFICE/OUTPT VISIT,EST,LEVL IV       PLAN    Discussed treatment/modality options, including risk and benefits, he desires:    advised alcohol consumption 1oz per day or less, advised aspirin 81 mg po daily, advised 1 multivitamin per day, advised calcium 7147-7203 mg/d and Vitamin D 800-1200 IU/d, advised dentist every 6 months, advised diet and exercise, advised opthalmologist every 1-2 years, advised self  "testicular exam q month, further health care maintenance, further lab(s), immunization(s), medication refill(s), SIMÓN 7, completed and reviewed, PHQ-9, Depression Action Plan, completed and reviewed and observation    Discussed controversies surrounding PSA. Specifically reviewed 2017 USPSTF findings recommending discussion of PSA testing for men ages 55-69.  Reviewed findings of the  Randomized Study of Screening for Prostate Cancer which showed a 30% reduction in advanced stage prostate cancer and a 20% reduction in death rate from prostate cancer in this age group. PSA-based screening may prevent up to 2 deaths and up to 3 cases of metastatic disease per 1,000 men screened over 13 years.    We've elected  to do PSA this year after discussing these controversies.    All diagnosis above reviewed and noted above, otherwise stable.      See [employer] orders for further details.      1) med refills    2) labs pending    3) immunizations in near future - COVID bivalent after 11/25, Flu, TdaP when feeling better    4) BRAT diet, consider stool cultures    5) melatonin / benadryl    6) neurology follow up - Dr Willis's office    7) EMG - bilateral upper extremities    Return in about 1 week (around 10/3/2022) for Lab Work.    Health Maintenance Due   Topic Date Due     DTAP/TDAP/TD IMMUNIZATION (4 - Td or Tdap) 06/21/2022     LIPID  08/16/2022     MICROALBUMIN  08/16/2022     PSA  08/16/2022     INFLUENZA VACCINE (1) 09/01/2022     COVID-19 Vaccine (4 - Booster for Mahi series) 09/19/2022     COUNSELING    Reviewed preventive health counseling, as reflected in patient instructions    BP Readings from Last 1 Encounters:   09/26/22 130/70     Estimated body mass index is 25.7 kg/m  as calculated from the following:    Height as of this encounter: 1.727 m (5' 8\").    Weight as of this encounter: 76.7 kg (169 lb).           reports that he has never smoked. He has never used smokeless tobacco.      Counseling " Resources:    ATP IV Guidelines  Pooled Cohorts Equation Calculator  FRAX Risk Assessment  ICSI Preventive Guidelines  Dietary Guidelines for Americans, 2010  USDA's MyPlate  ASA Prophylaxis  Lung CA Screening         Messi Saldana MD, FAAFP     [facility]  [address] [place] [id number]  [email or web address]   Office: [phone]  Fax: [phone]  Pager: [phone]     Identified Health Risks:

## 2022-09-27 RX ORDER — ALBUTEROL SULFATE 90 UG/1
AEROSOL, METERED RESPIRATORY (INHALATION)
Qty: 54 G | Refills: 0 | Status: SHIPPED | OUTPATIENT
Start: 2022-09-27

## 2022-09-29 ENCOUNTER — LAB (OUTPATIENT)
Dept: LAB | Facility: CLINIC | Age: 77
End: 2022-09-29
Payer: COMMERCIAL

## 2022-09-29 DIAGNOSIS — K22.70 BARRETT'S ESOPHAGUS WITHOUT DYSPLASIA: ICD-10-CM

## 2022-09-29 DIAGNOSIS — R19.7 DIARRHEA OF PRESUMED INFECTIOUS ORIGIN: ICD-10-CM

## 2022-09-29 DIAGNOSIS — E78.5 HYPERLIPIDEMIA LDL GOAL <100: ICD-10-CM

## 2022-09-29 DIAGNOSIS — Z00.00 ROUTINE GENERAL MEDICAL EXAMINATION AT A HEALTH CARE FACILITY: ICD-10-CM

## 2022-09-29 DIAGNOSIS — R73.03 PREDIABETES: ICD-10-CM

## 2022-09-29 DIAGNOSIS — K20.0 EE (EOSINOPHILIC ESOPHAGITIS): ICD-10-CM

## 2022-09-29 DIAGNOSIS — F51.01 PRIMARY INSOMNIA: ICD-10-CM

## 2022-09-29 DIAGNOSIS — Z12.5 SCREENING FOR PROSTATE CANCER: ICD-10-CM

## 2022-09-29 DIAGNOSIS — Z51.81 MEDICATION MONITORING ENCOUNTER: ICD-10-CM

## 2022-09-29 DIAGNOSIS — R20.2 NUMBNESS AND TINGLING IN BOTH HANDS: ICD-10-CM

## 2022-09-29 DIAGNOSIS — I10 HYPERTENSION GOAL BP (BLOOD PRESSURE) < 140/90: ICD-10-CM

## 2022-09-29 DIAGNOSIS — G47.33 OBSTRUCTIVE SLEEP APNEA SYNDROME: ICD-10-CM

## 2022-09-29 DIAGNOSIS — R20.0 NUMBNESS AND TINGLING IN BOTH HANDS: ICD-10-CM

## 2022-09-29 LAB
ALBUMIN SERPL-MCNC: 3.6 G/DL (ref 3.4–5)
ALBUMIN UR-MCNC: NEGATIVE MG/DL
ALP SERPL-CCNC: 97 U/L (ref 40–150)
ALT SERPL W P-5'-P-CCNC: 40 U/L (ref 0–70)
ANION GAP SERPL CALCULATED.3IONS-SCNC: 6 MMOL/L (ref 3–14)
APPEARANCE UR: CLEAR
AST SERPL W P-5'-P-CCNC: 23 U/L (ref 0–45)
BILIRUB SERPL-MCNC: 0.4 MG/DL (ref 0.2–1.3)
BILIRUB UR QL STRIP: NEGATIVE
BUN SERPL-MCNC: 18 MG/DL (ref 7–30)
CALCIUM SERPL-MCNC: 8.6 MG/DL (ref 8.5–10.1)
CHLORIDE BLD-SCNC: 108 MMOL/L (ref 94–109)
CHOLEST SERPL-MCNC: 145 MG/DL
CK SERPL-CCNC: 172 U/L (ref 30–300)
CO2 SERPL-SCNC: 28 MMOL/L (ref 20–32)
COLOR UR AUTO: YELLOW
CREAT SERPL-MCNC: 0.99 MG/DL (ref 0.66–1.25)
CREAT UR-MCNC: 167 MG/DL
ERYTHROCYTE [DISTWIDTH] IN BLOOD BY AUTOMATED COUNT: 12.6 % (ref 10–15)
FASTING STATUS PATIENT QL REPORTED: YES
GFR SERPL CREATININE-BSD FRML MDRD: 79 ML/MIN/1.73M2
GLUCOSE BLD-MCNC: 99 MG/DL (ref 70–99)
GLUCOSE UR STRIP-MCNC: NEGATIVE MG/DL
HBA1C MFR BLD: 5.3 % (ref 0–5.6)
HCT VFR BLD AUTO: 41.7 % (ref 40–53)
HDLC SERPL-MCNC: 48 MG/DL
HGB BLD-MCNC: 14 G/DL (ref 13.3–17.7)
HGB UR QL STRIP: NEGATIVE
KETONES UR STRIP-MCNC: NEGATIVE MG/DL
LDLC SERPL CALC-MCNC: 65 MG/DL
LEUKOCYTE ESTERASE UR QL STRIP: NEGATIVE
MCH RBC QN AUTO: 31.7 PG (ref 26.5–33)
MCHC RBC AUTO-ENTMCNC: 33.6 G/DL (ref 31.5–36.5)
MCV RBC AUTO: 94 FL (ref 78–100)
MICROALBUMIN UR-MCNC: 12 MG/L
MICROALBUMIN/CREAT UR: 7.19 MG/G CR (ref 0–17)
NITRATE UR QL: NEGATIVE
NONHDLC SERPL-MCNC: 97 MG/DL
PH UR STRIP: 5.5 [PH] (ref 5–7)
PLATELET # BLD AUTO: 257 10E3/UL (ref 150–450)
POTASSIUM BLD-SCNC: 4 MMOL/L (ref 3.4–5.3)
PROT SERPL-MCNC: 6.8 G/DL (ref 6.8–8.8)
PSA SERPL-MCNC: 0.97 UG/L (ref 0–4)
RBC # BLD AUTO: 4.42 10E6/UL (ref 4.4–5.9)
SODIUM SERPL-SCNC: 142 MMOL/L (ref 133–144)
SP GR UR STRIP: >=1.03 (ref 1–1.03)
TRIGL SERPL-MCNC: 162 MG/DL
TSH SERPL DL<=0.005 MIU/L-ACNC: 3.04 MU/L (ref 0.4–4)
UROBILINOGEN UR STRIP-ACNC: 0.2 E.U./DL
WBC # BLD AUTO: 6.7 10E3/UL (ref 4–11)

## 2022-09-29 PROCEDURE — G0103 PSA SCREENING: HCPCS

## 2022-09-29 PROCEDURE — 82043 UR ALBUMIN QUANTITATIVE: CPT

## 2022-09-29 PROCEDURE — 36415 COLL VENOUS BLD VENIPUNCTURE: CPT

## 2022-09-29 PROCEDURE — 82550 ASSAY OF CK (CPK): CPT

## 2022-09-29 PROCEDURE — 84443 ASSAY THYROID STIM HORMONE: CPT

## 2022-09-29 PROCEDURE — 80061 LIPID PANEL: CPT

## 2022-09-29 PROCEDURE — 83036 HEMOGLOBIN GLYCOSYLATED A1C: CPT

## 2022-09-29 PROCEDURE — 81003 URINALYSIS AUTO W/O SCOPE: CPT

## 2022-09-29 PROCEDURE — 80053 COMPREHEN METABOLIC PANEL: CPT

## 2022-09-29 PROCEDURE — 85027 COMPLETE CBC AUTOMATED: CPT

## 2022-11-21 ENCOUNTER — HEALTH MAINTENANCE LETTER (OUTPATIENT)
Age: 77
End: 2022-11-21

## 2022-12-16 ENCOUNTER — IMMUNIZATION (OUTPATIENT)
Dept: FAMILY MEDICINE | Facility: CLINIC | Age: 77
End: 2022-12-16
Payer: COMMERCIAL

## 2022-12-16 DIAGNOSIS — Z23 NEED FOR PROPHYLACTIC VACCINATION AND INOCULATION AGAINST INFLUENZA: ICD-10-CM

## 2022-12-16 DIAGNOSIS — Z23 HIGH PRIORITY FOR 2019-NCOV VACCINE: ICD-10-CM

## 2022-12-16 PROCEDURE — 90662 IIV NO PRSV INCREASED AG IM: CPT

## 2022-12-16 PROCEDURE — 0124A COVID-19 VACCINE BIVALENT BOOSTER 12+ (PFIZER): CPT

## 2022-12-16 PROCEDURE — G0008 ADMIN INFLUENZA VIRUS VAC: HCPCS

## 2022-12-16 PROCEDURE — 91312 COVID-19 VACCINE BIVALENT BOOSTER 12+ (PFIZER): CPT

## 2022-12-29 ENCOUNTER — OFFICE VISIT (OUTPATIENT)
Dept: NEUROLOGY | Facility: CLINIC | Age: 77
End: 2022-12-29
Attending: FAMILY MEDICINE
Payer: COMMERCIAL

## 2022-12-29 DIAGNOSIS — G56.01 CARPAL TUNNEL SYNDROME OF RIGHT WRIST: ICD-10-CM

## 2022-12-29 PROCEDURE — 95909 NRV CNDJ TST 5-6 STUDIES: CPT | Performed by: PSYCHIATRY & NEUROLOGY

## 2022-12-29 PROCEDURE — 95886 MUSC TEST DONE W/N TEST COMP: CPT | Mod: RT | Performed by: PSYCHIATRY & NEUROLOGY

## 2022-12-29 NOTE — LETTER
12/29/2022         RE: Alexander Alfred  [address]  [facility] [address]        Dear Colleague,    Thank you for referring your patient, Alexander Alfred, to the [facility] NEUROLOGY CLINIC [place]. Please see a copy of my visit note below.    See procedure note for EMG report      Again, thank you for allowing me to participate in the care of your patient.        Sincerely,        Jose Carlos Roach MD

## 2022-12-29 NOTE — PROCEDURES
ELECTROMYOGRAPHY (EMG) REPORT       [facility] NEUROLOGY [place]  [address] [place] [address]  Tel: [phone]  Fax: [phone]  [email or web address]     Alexander Alfred,  1945, MRN [id number]  PCP: Messi Saldana  Date: 2022     Principal Diagnosis: Carpal tunnel syndrome of right wrist     Height: 5 feet 8 inch  Reason for referral: Evaluate bilateral uppers. c/o numbness, tingling in both hands/fingers > 10 years. Right > Left. h/o left rotator cuff surgery. Per patient request only right upper extremity was tested.      Motor NCS      Nerve / Sites Lat Amp Dist Ruben    ms mV cm m/s   R Median - APB      Wrist 7.71 5.4 7       Elbow 13.54 4.6 27 46   R Ulnar - ADM      Wrist 2.86 13.0 7       B.Elbow 6.61 12.2 20 53      A.Elbow 8.80 12.3 12 55       F  Wave      Nerve Fmin    ms   R Ulnar - ADM 31.41       Sensory NCS      Nerve / Sites Onset Lat Pk Lat Amp.2-3 Dist Ruben Lat Diff    ms ms  V cm m/s ms   R Median - II (Antidr)      Wrist 2.66 5.31 14.0 13 49    R Ulnar - V (Antidr)      Wrist 2.08 2.76 11.5 11 53    R Median, Ulnar - Transcarpal comparison      Median Palm 2.66 3.85 8.5 8 30       Ulnar Palm 1.46 1.98 19.5 8 55          1.88       EMG Summary Table     Spontaneous MUAP Rcmt Note   Muscle Fib PSW Fasc IA # Amp Dur PPP Rate Type   R. Brachioradialis None None None N N N N N N N   R. Pronator teres None None None N N N N N N N   R. Biceps brachii None None None N N N N N N N   R. Deltoid None None None N N N N N N N   R. Triceps brachii None None None N N N N N N N   R. Flexor carpi ulnaris None None None N N N N N N N   R. First dorsal interosseous None None None N N N N N N N   R. Abductor pollicis brevis None None None N Sl Red Incr Incr N N N        Summary: Nerve conduction and EMG study of right upper extremity shows:  1. Delayed right median distal motor latency with normal amplitude and conduction velocity.  2. Normal right ulnar distal motor latency,  amplitude and conduction velocity  3. Normal right ulnar SNAP  4. Delayed right median peak sensory latency with slow sensory nerve conduction velocity.  5. Disposable, monopolar needle exam showed changes as above    Impression:   This is a abnormal nerve conduction and EMG study of right upper extremity that suggests right median neuropathy at or distal to the wrist consistent with severe right carpal tunnel syndrome      Jose Carlos Roach MD  [facility]  (Formerly, [facility]      This note was dictated using voice recognition software.  Any grammatical or context distortions are unintentional and inherent to the software.

## 2023-01-02 DIAGNOSIS — G56.01 CARPAL TUNNEL SYNDROME OF RIGHT WRIST: Primary | ICD-10-CM

## 2023-01-03 NOTE — PROGRESS NOTES
Messi Saldana MD  P Rv Triage  EMG notes Rt CTS, please refer to Hand surgery, Messi Carvalho       Referral placed yesterday. Patient has appointment on 2/7 with armen SO RN on 1/3/2023 at 10:36 AM   [facility]

## 2023-01-10 ENCOUNTER — OFFICE VISIT (OUTPATIENT)
Dept: ORTHOPEDICS | Facility: CLINIC | Age: 78
End: 2023-01-10
Attending: FAMILY MEDICINE
Payer: COMMERCIAL

## 2023-01-10 VITALS — DIASTOLIC BLOOD PRESSURE: 64 MMHG | SYSTOLIC BLOOD PRESSURE: 158 MMHG

## 2023-01-10 DIAGNOSIS — G56.01 CARPAL TUNNEL SYNDROME OF RIGHT WRIST: ICD-10-CM

## 2023-01-10 PROCEDURE — 99214 OFFICE O/P EST MOD 30 MIN: CPT | Mod: 57 | Performed by: STUDENT IN AN ORGANIZED HEALTH CARE EDUCATION/TRAINING PROGRAM

## 2023-01-10 NOTE — LETTER
1/10/2023         RE: Alexander Alfred  [address] [address]        Dear Colleague,    Thank you for referring your patient, Alexander Alfred, to the [facility] ORTHOPEDIC CLINIC [place]. Please see a copy of my visit note below.    Orthopaedic Surgery Hand and Upper Extremity Clinic H&P NOTE:  Date: Mainor 10, 2023    Patient Name: Alexander Alfred  MRN: [id number]    CHIEF COMPLAINT: Right carpal tunnel syndrome    Dominant Hand: right  Occupation: retired      HPI:  Mr. Alexander Alfred is a 77 year old male right hand dominant who presents with numbness/tingling in right median nerve distribution. Referred by Messi Saldana MD for a consult. Ongoing 10 years. Now constant, however does not wake him at night. Not using a brace. Aggravated by riding bicycle and motorcycle. Left has very mild symptoms.  He has tried hand therapy, and is aware of the nerve gliding exercises.    No diabetes does not smoke.    PAST MEDICAL HISTORY:  Past Medical History:   Diagnosis Date     Asthma, mild intermittent     Park nicollet asthma     Verde's esophagus 06/2020    Dr Ramirez, EGD due 1 yr     EE (eosinophilic esophagitis) 08/2013    Dr Ramirez     Hyperlipidemia LDL goal <130      Hypertension goal BP (blood pressure) < 140/90      Lumbar degenerative disc disease      Lumbar stenosis     Dr Knight - Calf weakness/atrophy     Nasal polyps 2004    approx 2/year - increased sx from nasal polyps - prednisone 40 mg x 2 days, 20 mg x 2 days, 10mg x 10 days with excellent results     Osteoarthritis of left hip      Other chronic pain      Sensorineural hearing loss (SNHL) of both ears     Dr Hidalgo - R>L     Sleep apnea 2006    moderate - CPAP - 5-15 cm       PAST SURGICAL HISTORY:  Past Surgical History:   Procedure Laterality Date     BUNIONECTOMY LAPIDUS WITH TARSAL METATARSAL (TMT) FUSION Right 2/16/2022    Procedure: 1.  Cheilectomy, right foot. 2.  [place]-Green first metatarsal bunionectomy, right foot. 3.   Debridement of joint, right first metatarsophalangeal joint.;  Surgeon: Chato Jeong DPM;  Location: [facility] OR     COLONOSCOPY  2010    normal - due 2020     COLONOSCOPY N/A 10/21/2020    normal, consider 10 yrs     DECOMPRESSION, FUSION LUMBAR POSTERIOR THREE + LEVELS, COMBINED N/A 10/07/2014    Dr Knight - COMBINED DECOMPRESSION, FUSION LUMBAR POSTERIOR THREE + LEVELS     ESOPHAGOSCOPY, GASTROSCOPY, DUODENOSCOPY (EGD), COMBINED  08/07/2013    Dr Ramirez - EE, non erosive gastropathy     ESOPHAGOSCOPY, GASTROSCOPY, DUODENOSCOPY (EGD), COMBINED N/A 06/02/2021    Verde's due 1 yr     ESOPHAGOSCOPY, GASTROSCOPY, DUODENOSCOPY (EGD), COMBINED N/A 7/27/2022    Procedure: ESOPHAGOGASTRODUODENOSCOPY (EGD) (fv) with biopsy by using cold forcep;  Surgeon: Teo Ramirez MD;  Location: [facility] GI     ROTATOR CUFF REPAIR RT/LT Left 2005    Lt shoulder - dr armaan lazar     ROTATOR CUFF REPAIR RT/LT Left 05/2009    Lt shoulder - dr armaan lazar     stress echo  07/2011    normal     STRESS ECHO (METRO)  06/2015    normal stress echo     SURGICAL HISTORY OF -   1982    Lt ankle CRIF     SURGICAL HISTORY OF -   04/2008    blephoplasty     SURGICAL HISTORY OF -   11/2016    T12-L1 - Decompression - Dr Knight -      TONSILLECTOMY  1950       MEDICATIONS:  Current Outpatient Medications   Medication     albuterol (PROAIR HFA/PROVENTIL HFA/VENTOLIN HFA) 108 (90 Base) MCG/ACT inhaler     aspirin 81 MG chewable tablet     Coenzyme Q10 (COQ-10) 100 MG CAPS     fluticasone (FLONASE) 50 MCG/ACT nasal spray     ibuprofen (ADVIL/MOTRIN) 600 MG tablet     KRILL OIL PO     losartan-hydrochlorothiazide (HYZAAR) 50-12.5 MG tablet     Multiple Vitamin (MULTIVITAMIN OR)     omeprazole (PRILOSEC) 20 MG DR capsule     Probiotic Product (4X PROBIOTIC) TABS     vitamin D3 (CHOLECALCIFEROL) 50 mcg (2000 units) tablet     No current facility-administered medications for this visit.       ALLERGIES:     Allergies   Allergen Reactions     Codeine  GI Disturbance     Hydrocodone GI Disturbance     Oxycodone GI Disturbance       FAMILY HISTORY:  No pertinent family history    SOCIAL HISTORY:  Social History     Tobacco Use     Smoking status: Never     Smokeless tobacco: Never   Vaping Use     Vaping Use: Never used   Substance Use Topics     Alcohol use: Yes     Comment: 1 drinks per day avg     Drug use: No       The patient's past medical, family, and social history was reviewed and confirmed.    REVIEW OF SYMPTOMS:      General: Negative   Eyes: Negative   Ear, Nose and Throat: Negative   Respiratory: Negative   Cardiovascular: Negative   Gastrointestinal: Negative   Genito-urinary: Negative   Musculoskeletal: Negative  Neurological: Negative   Psychological: Negative  HEME: Negative   ENDO: Negative   SKIN: Negative    VITALS:  There were no vitals filed for this visit.    EXAM:  General: NAD, A&Ox3  HEENT: NC/AT  CV: RRR by peripheral pulse  Pulmonary: Non-labored breathing on RA  RUE:  Skin intact, mild thenar atrophy  5/5 EPL, FPL, APB, and intrinsics  Negative Tinel's at the carpal tunnel, positive Durkan's compression test at the carpal tunnel  Two-point discrimination 5 mm median and ulnar  WWP CR< 2s      EMG/NCS  Summary: Nerve conduction and EMG study of right upper extremity shows:  1. Delayed right median distal motor latency with normal amplitude and conduction velocity.  2. Normal right ulnar distal motor latency, amplitude and conduction velocity  3. Normal right ulnar SNAP  4. Delayed right median peak sensory latency with slow sensory nerve conduction velocity.  5. Disposable, monopolar needle exam showed changes as above     Impression:   This is a abnormal nerve conduction and EMG study of right upper extremity that suggests right median neuropathy at or distal to the wrist consistent with severe right carpal tunnel syndrome    I have personally reviewed the above images and labs.         IMPRESSION AND RECOMMENDATIONS:  Mr. Alexander PEREZ Tima  is a 77 year old male right hand dominant with right carpal tunnel syndrome.    We discussed the diagnosis, prognosis, and treatment options.    We discussed both conservative management as well as surgical options.    Conservative management would be to start with night splinting and/or injections.  I am not confident that the night splinting will help much as he does not have nocturnal symptoms.    Given his EMG findings, the patient is a candidate for carpal tunnel release at this time.  He is interested in this and is leaning towards the endoscopic procedure due to the shortened recovery time.    The indications for surgery were discussed with the patient. The benefits, risks, and alternatives of operative management were discussed in detail with the patient. The patient understands that the risks of surgery include, but are not limited to: infection, bleeding, injury to nearby structures (such as nerves, blood vessels, and tendons), wound healing problems, conversion to open carpal tunnel release, need for additional surgery, pain, stiffness, scarring, need for rehabilitation, and anesthetic complications.  Patient expressed understanding and will talk to his wife. All questions were answered to the patient's satisfaction.    Case request placed. [facility]    Toribio Alfaro MD    Hand, Upper Extremity & Microvascular Surgery  Department of Orthopaedic Surgery  [facility]          Again, thank you for allowing me to participate in the care of your patient.        Sincerely,        Toribio Alfaro MD

## 2023-01-10 NOTE — PATIENT INSTRUCTIONS
Thank you for choosing [facility]    Dr. Alfaro Locations:    [facility] [address], [address]  [address] [address]   Appointments: [phone] Appointments: [phone]   Fax: [phone] Fax: [phone]       Please call Surgical Scheduling at [phone] to schedule your surgery appointment.    Please call [phone] to schedule your follow up appointment.        denies pain/discomfort

## 2023-01-10 NOTE — PROGRESS NOTES
Orthopaedic Surgery Hand and Upper Extremity Clinic H&P NOTE:  Date: Mainor 10, 2023    Patient Name: Alexander Alfred  MRN: [id number]    CHIEF COMPLAINT: Right carpal tunnel syndrome    Dominant Hand: right  Occupation: retired      HPI:  Mr. Alexander Alfred is a 77 year old male right hand dominant who presents with numbness/tingling in right median nerve distribution. Referred by Messi Saldana MD for a consult. Ongoing 10 years. Now constant, however does not wake him at night. Not using a brace. Aggravated by riding bicycle and motorcycle. Left has very mild symptoms.  He has tried hand therapy, and is aware of the nerve gliding exercises.    No diabetes does not smoke.    PAST MEDICAL HISTORY:  Past Medical History:   Diagnosis Date     Asthma, mild intermittent     Park nicollet asthma     Verde's esophagus 06/2020    Dr Ramirez, EGD due 1 yr     EE (eosinophilic esophagitis) 08/2013    Dr Ramirez     Hyperlipidemia LDL goal <130      Hypertension goal BP (blood pressure) < 140/90      Lumbar degenerative disc disease      Lumbar stenosis     Dr Knight - Calf weakness/atrophy     Nasal polyps 2004    approx 2/year - increased sx from nasal polyps - prednisone 40 mg x 2 days, 20 mg x 2 days, 10mg x 10 days with excellent results     Osteoarthritis of left hip      Other chronic pain      Sensorineural hearing loss (SNHL) of both ears     Dr Hidalgo - R>L     Sleep apnea 2006    moderate - CPAP - 5-15 cm       PAST SURGICAL HISTORY:  Past Surgical History:   Procedure Laterality Date     BUNIONECTOMY LAPIDUS WITH TARSAL METATARSAL (TMT) FUSION Right 2/16/2022    Procedure: 1.  Cheilectomy, right foot. 2.  [place] first metatarsal bunionectomy, right foot. 3.  Debridement of joint, right first metatarsophalangeal joint.;  Surgeon: Chato Jeong DPM;  Location: [facility] OR     COLONOSCOPY  2010    normal - due 2020     COLONOSCOPY N/A 10/21/2020    normal, consider 10 yrs     DECOMPRESSION, FUSION LUMBAR  POSTERIOR THREE + LEVELS, COMBINED N/A 10/07/2014    Dr Knight - COMBINED DECOMPRESSION, FUSION LUMBAR POSTERIOR THREE + LEVELS     ESOPHAGOSCOPY, GASTROSCOPY, DUODENOSCOPY (EGD), COMBINED  08/07/2013    Dr Ramirez - EE, non erosive gastropathy     ESOPHAGOSCOPY, GASTROSCOPY, DUODENOSCOPY (EGD), COMBINED N/A 06/02/2021    Verde's due 1 yr     ESOPHAGOSCOPY, GASTROSCOPY, DUODENOSCOPY (EGD), COMBINED N/A 7/27/2022    Procedure: ESOPHAGOGASTRODUODENOSCOPY (EGD) (fv) with biopsy by using cold forcep;  Surgeon: Teo Ramirez MD;  Location: [facility] GI     ROTATOR CUFF REPAIR RT/LT Left 2005    Lt shoulder - dr armaan lazar     ROTATOR CUFF REPAIR RT/LT Left 05/2009    Lt shoulder - dr armaan lazar     stress echo  07/2011    normal     STRESS ECHO (METRO)  06/2015    normal stress echo     SURGICAL HISTORY OF -   1982    Lt ankle CRIF     SURGICAL HISTORY OF -   04/2008    blephoplasty     SURGICAL HISTORY OF -   11/2016    T12-L1 - Decompression - Dr Knight -      TONSILLECTOMY  1950       MEDICATIONS:  Current Outpatient Medications   Medication     albuterol (PROAIR HFA/PROVENTIL HFA/VENTOLIN HFA) 108 (90 Base) MCG/ACT inhaler     aspirin 81 MG chewable tablet     Coenzyme Q10 (COQ-10) 100 MG CAPS     fluticasone (FLONASE) 50 MCG/ACT nasal spray     ibuprofen (ADVIL/MOTRIN) 600 MG tablet     KRILL OIL PO     losartan-hydrochlorothiazide (HYZAAR) 50-12.5 MG tablet     Multiple Vitamin (MULTIVITAMIN OR)     omeprazole (PRILOSEC) 20 MG DR capsule     Probiotic Product (4X PROBIOTIC) TABS     vitamin D3 (CHOLECALCIFEROL) 50 mcg (2000 units) tablet     No current facility-administered medications for this visit.       ALLERGIES:     Allergies   Allergen Reactions     Codeine GI Disturbance     Hydrocodone GI Disturbance     Oxycodone GI Disturbance       FAMILY HISTORY:  No pertinent family history    SOCIAL HISTORY:  Social History     Tobacco Use     Smoking status: Never     Smokeless tobacco: Never   Vaping Use      Vaping Use: Never used   Substance Use Topics     Alcohol use: Yes     Comment: 1 drinks per day avg     Drug use: No       The patient's past medical, family, and social history was reviewed and confirmed.    REVIEW OF SYMPTOMS:      General: Negative   Eyes: Negative   Ear, Nose and Throat: Negative   Respiratory: Negative   Cardiovascular: Negative   Gastrointestinal: Negative   Genito-urinary: Negative   Musculoskeletal: Negative  Neurological: Negative   Psychological: Negative  HEME: Negative   ENDO: Negative   SKIN: Negative    VITALS:  There were no vitals filed for this visit.    EXAM:  General: NAD, A&Ox3  HEENT: NC/AT  CV: RRR by peripheral pulse  Pulmonary: Non-labored breathing on RA  RUE:  Skin intact, mild thenar atrophy  5/5 EPL, FPL, APB, and intrinsics  Negative Tinel's at the carpal tunnel, positive Durkan's compression test at the carpal tunnel  Two-point discrimination 5 mm median and ulnar  WWP CR< 2s      EMG/NCS  Summary: Nerve conduction and EMG study of right upper extremity shows:  1. Delayed right median distal motor latency with normal amplitude and conduction velocity.  2. Normal right ulnar distal motor latency, amplitude and conduction velocity  3. Normal right ulnar SNAP  4. Delayed right median peak sensory latency with slow sensory nerve conduction velocity.  5. Disposable, monopolar needle exam showed changes as above     Impression:   This is a abnormal nerve conduction and EMG study of right upper extremity that suggests right median neuropathy at or distal to the wrist consistent with severe right carpal tunnel syndrome    I have personally reviewed the above images and labs.         IMPRESSION AND RECOMMENDATIONS:  Mr. Alexander Alfred is a 77 year old male right hand dominant with right carpal tunnel syndrome.    We discussed the diagnosis, prognosis, and treatment options.    We discussed both conservative management as well as surgical options.    Conservative management would  be to start with night splinting and/or injections.  I am not confident that the night splinting will help much as he does not have nocturnal symptoms.    Given his EMG findings, the patient is a candidate for carpal tunnel release at this time.  He is interested in this and is leaning towards the endoscopic procedure due to the shortened recovery time.    The indications for surgery were discussed with the patient. The benefits, risks, and alternatives of operative management were discussed in detail with the patient. The patient understands that the risks of surgery include, but are not limited to: infection, bleeding, injury to nearby structures (such as nerves, blood vessels, and tendons), wound healing problems, conversion to open carpal tunnel release, need for additional surgery, pain, stiffness, scarring, need for rehabilitation, and anesthetic complications.  Patient expressed understanding and will talk to his wife. All questions were answered to the patient's satisfaction.    Case request placed. Local + [facility].    Toribio Alfaro MD    Hand, Upper Extremity & Microvascular Surgery  Department of Orthopaedic Surgery  [facility]

## 2023-01-25 ENCOUNTER — TRANSFERRED RECORDS (OUTPATIENT)
Dept: HEALTH INFORMATION MANAGEMENT | Facility: CLINIC | Age: 78
End: 2023-01-25
Payer: COMMERCIAL

## 2023-03-16 ENCOUNTER — TRANSFERRED RECORDS (OUTPATIENT)
Dept: HEALTH INFORMATION MANAGEMENT | Facility: CLINIC | Age: 78
End: 2023-03-16
Payer: COMMERCIAL

## 2023-03-29 ENCOUNTER — TELEPHONE (OUTPATIENT)
Dept: ORTHOPEDICS | Facility: CLINIC | Age: 78
End: 2023-03-29
Payer: COMMERCIAL

## 2023-03-29 NOTE — TELEPHONE ENCOUNTER
Phoned patient to get him scheduled for surgery   with Dr. Alfaro    Patient was unavailable,   Provided call back number in voicemail:   [phone] & [phone] for care team.

## 2023-04-11 DIAGNOSIS — K20.0 EE (EOSINOPHILIC ESOPHAGITIS): ICD-10-CM

## 2023-04-11 DIAGNOSIS — K22.70 BARRETT'S ESOPHAGUS WITHOUT DYSPLASIA: ICD-10-CM

## 2023-04-11 DIAGNOSIS — G47.33 OBSTRUCTIVE SLEEP APNEA SYNDROME: ICD-10-CM

## 2023-04-11 DIAGNOSIS — Z00.00 ROUTINE GENERAL MEDICAL EXAMINATION AT A HEALTH CARE FACILITY: ICD-10-CM

## 2023-04-11 DIAGNOSIS — J33.9 NASAL POLYP: ICD-10-CM

## 2023-04-11 DIAGNOSIS — R73.03 PREDIABETES: ICD-10-CM

## 2023-04-11 DIAGNOSIS — I10 HYPERTENSION GOAL BP (BLOOD PRESSURE) < 140/90: ICD-10-CM

## 2023-04-12 RX ORDER — LOSARTAN POTASSIUM AND HYDROCHLOROTHIAZIDE 12.5; 5 MG/1; MG/1
1 TABLET ORAL DAILY
Qty: 90 TABLET | Refills: 3 | Status: SHIPPED | OUTPATIENT
Start: 2023-04-12 | End: 2023-10-31

## 2023-04-12 RX ORDER — FLUTICASONE PROPIONATE 50 MCG
2 SPRAY, SUSPENSION (ML) NASAL DAILY
Qty: 48 G | Refills: 3 | Status: SHIPPED | OUTPATIENT
Start: 2023-04-12 | End: 2023-10-31

## 2023-04-12 NOTE — TELEPHONE ENCOUNTER
Patient desires refills, last cpx 9/26/2022    Please fax to [phone] - [facility]

## 2023-04-13 NOTE — TELEPHONE ENCOUNTER
Second attempt:   Phoned patient to get him scheduled for surgery with Dr. Alfaro     Patient was unavailable,   Provided call back number in voicemail:   [phone] & [phone] for care team.

## 2023-04-18 NOTE — TELEPHONE ENCOUNTER
"Phoned patient to get him schedule for surgery with Dr. Alfaro.     Patient stated that he and his wife have decided to proceed with surgery at a different facility, with a different surgeon.     Patient stated to \"cross him off our list.\"   Surgery order has been cancelled.    Patient ended the call.     Will route to Dr. Alfaro's team as an FYI.     Patient expressed no other questions/concerns prior to ending the call.   "

## 2023-04-19 NOTE — TELEPHONE ENCOUNTER
Routing to Dr. Alfaro as FYI. No action needed.     Shelby Urbina MSA, ATC  Certified Athletic Trainer

## 2023-07-07 ENCOUNTER — MYC MEDICAL ADVICE (OUTPATIENT)
Dept: FAMILY MEDICINE | Facility: CLINIC | Age: 78
End: 2023-07-07
Payer: COMMERCIAL

## 2023-07-11 NOTE — TELEPHONE ENCOUNTER
Please see my chart message below     Please review and advise     Thank you     Dianna Geronimo RN, BSN  [place] [facility]

## 2023-07-12 NOTE — TELEPHONE ENCOUNTER
Advise cpx fasting after 9/26/2023    Advise increase fiber in your diet, metamucil    Advise limit caffeine, urinate before bed, limit fluids after super, up once per night at age 78 not unusual    Consider GI/Urology consult

## 2023-08-02 ENCOUNTER — TELEPHONE (OUTPATIENT)
Dept: GASTROENTEROLOGY | Facility: CLINIC | Age: 78
End: 2023-08-02
Payer: COMMERCIAL

## 2023-08-02 NOTE — TELEPHONE ENCOUNTER
"Endoscopy Scheduling Screen    Have you had a positive Covid test in the last 14 days?  No    Are you active on MyChart?   Yes    What insurance is in the chart?  Other:  [employer]    Ordering/Referring Provider: Teo Ramirez MD    (If ordering provider performs procedure, schedule with ordering provider unless otherwise instructed. )    BMI: Estimated body mass index is 25.7 kg/m  as calculated from the following:    Height as of 9/26/22: 1.727 m (5' 8\").    Weight as of 9/26/22: 76.7 kg (169 lb).     Sedation Ordered  moderate sedation.   If patient BMI > 50 do not schedule in [facility].    Are you taking any prescription medications for pain?   No    Are you taking methadone or Suboxone?  No    Do you have a history of malignant hyperthermia or adverse reaction to anesthesia?  No    (Females) Are you currently pregnant?   No     Have you been diagnosed or told you have pulmonary hypertension?   No    Do you have an LVAD?  No    Have you been told you have moderate to severe sleep apnea?  Yes (RN Review required for scheduling unless scheduling in Hospital.)    Have you been told you have COPD, asthma, or any other lung disease?  No    Do you have any heart conditions?  No     Have you ever had or are you awaiting a heart or lung transplant?   No    Have you had a stroke or transient ischemic attack (TIA aka \"mini stroke\" in the last 6 months?   No    Have you been diagnosed with or been told you have cirrhosis of the liver?   No    Are you currently on dialysis?   No    Do you need assistance transferring?   No    BMI: Estimated body mass index is 25.7 kg/m  as calculated from the following:    Height as of 9/26/22: 1.727 m (5' 8\").    Weight as of 9/26/22: 76.7 kg (169 lb).     Is patients BMI > 40 and scheduling location [facility]?  No    Do you take the medication Phentermine, Ozempic or Wegovy?  No    Do you take the medication Naltrexone?  No    Do you take blood thinners?  No      Prep   Are you currently on " dialysis or do you have chronic kidney disease?  No    Do you have a diagnosis of diabetes?  No    Do you have a diagnosis of cystic fibrosis (CF)?  No    On a regular basis do you go 3 -5 days between bowel movements?  No    BMI > 40?  No    Preferred Pharmacy:    [facility]  User for all [facility] all locations.  New  [place] pt must call [phone] to  verify address.  Phone: [phone] Fax: [phone]    WRITTEN PRESCRIPTION REQUESTED  No address on file      [id number] - [place], [place] - [address] [facility] [address]  [address]  [facility] [address]  Phone: [phone] Fax: [phone]      Final Scheduling Details   Colonoscopy prep sent?  N/A    Procedure scheduled  Upper endoscopy (EGD)    Surgeon:  James     Date of procedure:  09/07     Schedule PAC:   No    Location  [facility]    Sedation   Moderate Sedation    Patient Reminders:   You will receive a call from a Nurse to review instructions and health history.  This assessment must be completed prior to your procedure.  Failure to complete the Nurse assessment may result in the procedure being cancelled.      On the day of your procedure, please designate an adult(s) who can drive you home stay with you for the next 24 hours. The medicines used in the exam will make you sleepy. You will not be able to drive.      You cannot take public transportation, ride share services, or non-medical taxi service without a responsible caregiver.  Medical transport services are allowed with the requirement that a responsible caregiver will receive you at your destination.  We require that drivers and caregivers are confirmed prior to your procedure.

## 2023-08-16 ENCOUNTER — HOSPITAL ENCOUNTER (EMERGENCY)
Facility: CLINIC | Age: 78
Discharge: HOME | End: 2023-08-16
Attending: STUDENT IN AN ORGANIZED HEALTH CARE EDUCATION/TRAINING PROGRAM | Admitting: STUDENT IN AN ORGANIZED HEALTH CARE EDUCATION/TRAINING PROGRAM
Payer: COMMERCIAL

## 2023-08-16 ENCOUNTER — APPOINTMENT (OUTPATIENT)
Dept: CT IMAGING | Facility: CLINIC | Age: 78
End: 2023-08-16
Attending: STUDENT IN AN ORGANIZED HEALTH CARE EDUCATION/TRAINING PROGRAM
Payer: COMMERCIAL

## 2023-08-16 VITALS
TEMPERATURE: 97.3 F | OXYGEN SATURATION: 97 % | DIASTOLIC BLOOD PRESSURE: 60 MMHG | HEART RATE: 57 BPM | RESPIRATION RATE: 20 BRPM | SYSTOLIC BLOOD PRESSURE: 126 MMHG
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
## 2023-08-16 DIAGNOSIS — S01.01XA SCALP LACERATION, INITIAL ENCOUNTER: ICD-10-CM

## 2023-08-16 PROCEDURE — 72125 CT NECK SPINE W/O DYE: CPT

## 2023-08-16 PROCEDURE — 99284 EMERGENCY DEPT VISIT MOD MDM: CPT | Mod: 25

## 2023-08-16 PROCEDURE — 12002 RPR S/N/AX/GEN/TRNK2.6-7.5CM: CPT

## 2023-08-16 PROCEDURE — 250N000009 HC RX 250: Performed by: STUDENT IN AN ORGANIZED HEALTH CARE EDUCATION/TRAINING PROGRAM

## 2023-08-16 PROCEDURE — 70450 CT HEAD/BRAIN W/O DYE: CPT

## 2023-08-16 RX ADMIN — Medication 3 ML: at 12:47

## 2023-08-16 ASSESSMENT — ACTIVITIES OF DAILY LIVING (ADL)
ADLS_ACUITY_SCORE: 35
ADLS_ACUITY_SCORE: 35
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
## 2023-08-16 NOTE — ED TRIAGE NOTES
Arrives via EMS after falling from monkey bars and hitting his head on some playground equipment. Was wearing a helmet, sustained a laceration to posterior scalp and reports that there was LOC. C-collar applied by EMS, alert and oriented, ABCs intact.     Triage Assessment       Row Name 08/16/23 1157       Triage Assessment (Adult)    Airway WDL WDL       Respiratory WDL    Respiratory WDL WDL       Skin Circulation/Temperature WDL    Skin Circulation/Temperature WDL WDL       Cardiac WDL    Cardiac WDL WDL       Peripheral/Neurovascular WDL    Peripheral Neurovascular WDL WDL       Cognitive/Neuro/Behavioral WDL    Cognitive/Neuro/Behavioral WDL WDL

## 2023-08-18 ASSESSMENT — ENCOUNTER SYMPTOMS
NERVOUS/ANXIOUS: 0
JOINT SWELLING: 0
ARTHRALGIAS: 0
HEADACHES: 0
PARESTHESIAS: 0
ABDOMINAL PAIN: 0
CHILLS: 0
HEMATOCHEZIA: 0
MYALGIAS: 0
SORE THROAT: 0
DIZZINESS: 0
DYSURIA: 0
SHORTNESS OF BREATH: 0
PALPITATIONS: 0
CONSTIPATION: 0
DIARRHEA: 0
HEMATURIA: 0
FEVER: 0
WEAKNESS: 0
EYE PAIN: 0
COUGH: 0
NAUSEA: 0
FREQUENCY: 0
HEARTBURN: 0

## 2023-08-18 ASSESSMENT — ACTIVITIES OF DAILY LIVING (ADL): CURRENT_FUNCTION: NO ASSISTANCE NEEDED

## 2023-08-18 ASSESSMENT — ASTHMA QUESTIONNAIRES: ACT_TOTALSCORE: 25

## 2023-08-21 ENCOUNTER — OFFICE VISIT (OUTPATIENT)
Dept: FAMILY MEDICINE | Facility: CLINIC | Age: 78
End: 2023-08-21
Payer: COMMERCIAL

## 2023-08-21 VITALS
HEIGHT: 67 IN | TEMPERATURE: 98.1 F | OXYGEN SATURATION: 96 % | WEIGHT: 173.1 LBS | RESPIRATION RATE: 16 BRPM | SYSTOLIC BLOOD PRESSURE: 122 MMHG | DIASTOLIC BLOOD PRESSURE: 68 MMHG | BODY MASS INDEX: 27.17 KG/M2 | HEART RATE: 62 BPM

## 2023-08-21 DIAGNOSIS — Z51.81 MEDICATION MONITORING ENCOUNTER: ICD-10-CM

## 2023-08-21 DIAGNOSIS — E78.5 HYPERLIPIDEMIA LDL GOAL <100: ICD-10-CM

## 2023-08-21 DIAGNOSIS — R73.03 PREDIABETES: ICD-10-CM

## 2023-08-21 DIAGNOSIS — J45.20 MILD INTERMITTENT ASTHMA WITHOUT COMPLICATION: ICD-10-CM

## 2023-08-21 DIAGNOSIS — M51.369 LUMBAR DEGENERATIVE DISC DISEASE: ICD-10-CM

## 2023-08-21 DIAGNOSIS — M48.062 SPINAL STENOSIS OF LUMBAR REGION WITH NEUROGENIC CLAUDICATION: ICD-10-CM

## 2023-08-21 DIAGNOSIS — I10 HYPERTENSION GOAL BP (BLOOD PRESSURE) < 140/90: ICD-10-CM

## 2023-08-21 DIAGNOSIS — K22.70 BARRETT'S ESOPHAGUS WITHOUT DYSPLASIA: ICD-10-CM
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
## 2023-08-21 DIAGNOSIS — G47.33 OBSTRUCTIVE SLEEP APNEA SYNDROME: ICD-10-CM

## 2023-08-21 DIAGNOSIS — K20.0 EE (EOSINOPHILIC ESOPHAGITIS): ICD-10-CM

## 2023-08-21 DIAGNOSIS — M48.061 LUMBAR FORAMINAL STENOSIS: ICD-10-CM

## 2023-08-21 DIAGNOSIS — M54.17 LEFT LUMBOSACRAL RADICULOPATHY: ICD-10-CM

## 2023-08-21 DIAGNOSIS — M54.50 LUMBAR PAIN: ICD-10-CM

## 2023-08-21 DIAGNOSIS — M47.816 LUMBAR FACET ARTHROPATHY: ICD-10-CM

## 2023-08-21 DIAGNOSIS — M48.00 CENTRAL SPINAL STENOSIS: ICD-10-CM

## 2023-08-21 DIAGNOSIS — S01.01XD LACERATION OF SCALP, SUBSEQUENT ENCOUNTER: ICD-10-CM

## 2023-08-21 PROCEDURE — 99213 OFFICE O/P EST LOW 20 MIN: CPT | Performed by: FAMILY MEDICINE

## 2023-08-21 ASSESSMENT — ENCOUNTER SYMPTOMS
HEADACHES: 0
DIARRHEA: 0
HEARTBURN: 0
HEMATURIA: 0
HEMATOCHEZIA: 0
NAUSEA: 0
PARESTHESIAS: 0
MYALGIAS: 0
CHILLS: 0
EYE PAIN: 0
DYSURIA: 0
PALPITATIONS: 0
FREQUENCY: 0
WEAKNESS: 0
SHORTNESS OF BREATH: 0
NERVOUS/ANXIOUS: 0
COUGH: 0
CONSTIPATION: 0
FEVER: 0
JOINT SWELLING: 0
ARTHRALGIAS: 0
SORE THROAT: 0
ABDOMINAL PAIN: 0
DIZZINESS: 0

## 2023-08-21 ASSESSMENT — ACTIVITIES OF DAILY LIVING (ADL): CURRENT_FUNCTION: NO ASSISTANCE NEEDED

## 2023-08-21 NOTE — PATIENT INSTRUCTIONS
Patient Education   Personalized Prevention Plan  You are due for the preventive services outlined below.  Your care team is available to assist you in scheduling these services.  If you have already completed any of these items, please share that information with your care team to update in your medical record.  Health Maintenance Due   Topic Date Due     Diptheria Tetanus Pertussis (DTAP/TDAP/TD) Vaccine (4 - Td or Tdap) 06/21/2022     COVID-19 Vaccine (5 - Additional dose for Mahi series) 04/16/2023

## 2023-08-21 NOTE — PROGRESS NOTES
"SUBJECTIVE:   Rupert is a 78 year old who presents for Preventive Visit.      8/21/2023     9:42 AM   Additional Questions   Roomed by Agnes TEJEDA       Are you in the first 12 months of your Medicare coverage?  No    Healthy Habits:     In general, how would you rate your overall health?  Good    Frequency of exercise:  4-5 days/week    Duration of exercise:  30-45 minutes    Do you usually eat at least 4 servings of fruit and vegetables a day, include whole grains    & fiber and avoid regularly eating high fat or \"junk\" foods?  Yes    Taking medications regularly:  Yes    Medication side effects:  None    Ability to successfully perform activities of daily living:  No assistance needed    Home Safety:  No safety concerns identified    Hearing Impairment:  Difficulty following a conversation in a noisy restaurant or crowded room    In the past 6 months, have you been bothered by leaking of urine?  No    In general, how would you rate your overall mental or emotional health?  Excellent    Additional concerns today:  No        Have you ever done Advance Care Planning? (For example, a Health Directive, POLST, or a discussion with a medical provider or your loved ones about your wishes): Yes, advance care planning is on file.    {Hearing Test Done (Optional):970534}   Fall risk  Fallen 2 or more times in the past year?: No  Any fall with injury in the past year?: No    Cognitive Screening   1) Repeat 3 items (Leader, Season, Table)  {Get patient's attention, then say, \"I am going to say three words that I want you to remember now and later.  The words are Leader, Season, and Table.  Please say them for me now.\"  If pt. unable after 3 tries, go to next item}  2) Clock draw: {Say the following phrases in order: \"Please draw a clock.  Start by drawing a large [place].  Put all the numbers in the [place] and set the hands to show 11:10 (10 past 11).\"  If pt cannot complete in 3 minutes, stop and ask for recall items.  " "\"NORMAL\" test = all twelve numbers in correct location, and clock hands correctly designating 11:10}{ :82445::\"NORMAL\"}  3) 3 item recall: {Say: \"What were the three words I asked you to remember?\"}{ :991461}  Results: {MINICOG RESULTS:105897}    Mini-CogTM Copyright S Carlie. Licensed by the author for use in [facility]; reprinted with permission (soob@[facility]). All rights reserved.      {Do you have sleep apnea, excessive snoring or daytime drowsiness? (Optional):278782}    Reviewed and updated as needed this visit by clinical staff                  Reviewed and updated as needed this visit by Provider                 Social History     Tobacco Use    Smoking status: Never    Smokeless tobacco: Never   Substance Use Topics    Alcohol use: Yes     Comment: 1 drinks per day avg     {Rooming staff  Click this link to complete the Prescreen if response below is not for today's visit  Alcohol Use Prescreen >3 drinks/day or > 7 drinks/week.  If the prescreen question answer is YES, complete the full AUDIT  :666721}        8/14/2023     1:14 PM   Alcohol Use   Prescreen: >3 drinks/day or >7 drinks/week? No   {add AUDIT responses (Optional) (A score of 7 for adult men is an indication of hazardous drinking; a score of 8 or more is an indication of an alcohol use disorder.  A score of 7 or more for adult women is an indication of hazardous drinking or an alchohol use disorder):364910}  Do you have a current opioid prescription? { :935652}  Do you use any other controlled substances or medications that are not prescribed by a provider? {Substance Use :269877}  {Provider  If there are gaps in the social history shown above, please follow the link and refresh the note Link to Social and Substance History :308660}    {Outside tests to abstract? :630971}    {additional problems to add (Optional):049280}    Current providers sharing in care for this patient include: {Rooming staff:  Please update Care Team from " storyboard, refresh this note and then delete this statement}  Patient Care Team:  Messi Saldana MD as PCP - General  Messi Saldana MD as Assigned PCP  Chato Jeong DPM as Assigned Musculoskeletal Provider  Jose Carlos Roach MD as Assigned Neuroscience Provider    The following health maintenance items are reviewed in Epic and correct as of today:  Health Maintenance   Topic Date Due    DTAP/TDAP/TD IMMUNIZATION (4 - Td or Tdap) 06/21/2022    COVID-19 Vaccine (5 - Additional dose for Mahi series) 04/16/2023    INFLUENZA VACCINE (1) 09/01/2023    MEDICARE ANNUAL WELLNESS VISIT  09/26/2023    ANNUAL REVIEW OF HM ORDERS  09/26/2023    ASTHMA ACTION PLAN  09/26/2023    BMP  09/29/2023    LIPID  09/29/2023    MICROALBUMIN  09/29/2023    PSA  09/29/2023    ASTHMA CONTROL TEST  02/21/2024    FALL RISK ASSESSMENT  08/21/2024    ADVANCE CARE PLANNING  09/26/2027    COLORECTAL CANCER SCREENING  10/21/2030    HEPATITIS C SCREENING  Completed    PHQ-2 (once per calendar year)  Completed    Pneumococcal Vaccine: 65+ Years  Completed    ZOSTER IMMUNIZATION  Completed    IPV IMMUNIZATION  Aged Out    MENINGITIS IMMUNIZATION  Aged Out     {Chronicprobdata (optional):080378}  {Decision Support (Optional):916871}        Review of Systems   Constitutional:  Negative for chills and fever.   HENT:  Negative for congestion, ear pain, hearing loss and sore throat.    Eyes:  Negative for pain and visual disturbance.   Respiratory:  Negative for cough and shortness of breath.    Cardiovascular:  Negative for chest pain, palpitations and peripheral edema.   Gastrointestinal:  Negative for abdominal pain, constipation, diarrhea, heartburn, hematochezia and nausea.   Genitourinary:  Positive for urgency. Negative for dysuria, frequency, genital sores, hematuria, impotence and penile discharge.   Musculoskeletal:  Negative for arthralgias, joint swelling and myalgias.   Skin:  Negative for rash.   Neurological:  Negative for dizziness,  "weakness, headaches and paresthesias.   Psychiatric/Behavioral:  Negative for mood changes. The patient is not nervous/anxious.      {ROS COMP (Optional):023371}    OBJECTIVE:   There were no vitals taken for this visit. Estimated body mass index is 25.7 kg/m  as calculated from the following:    Height as of 22: 1.727 m (5' 8\").    Weight as of 22: 76.7 kg (169 lb).  Physical Exam  {Exam (Optional) :663791}    {Diagnostic Test Results (Optional):882893}    ASSESSMENT / PLAN:   {Diag Picklist:589095}    {Patient advised of split billing (Optional):567576}      COUNSELING:  {Medicare Counselin}      BMI:   Estimated body mass index is 25.7 kg/m  as calculated from the following:    Height as of 22: 1.727 m (5' 8\").    Weight as of 22: 76.7 kg (169 lb).   {Weight Management Plan needed for ACO:979460}      He reports that he has never smoked. He has never used smokeless tobacco.      Appropriate preventive services were discussed with this patient, including applicable screening as appropriate for cardiovascular disease, diabetes, osteopenia/osteoporosis, and glaucoma.  As appropriate for age/gender, discussed screening for colorectal cancer, prostate cancer, breast cancer, and cervical cancer. Checklist reviewing preventive services available has been given to the patient.    Reviewed patients plan of care and provided an AVS. The {CarePlan:642773} for Alexander meets the Care Plan requirement. This Care Plan has been established and reviewed with the {PATIENT, FAMILY MEMBER, CAREGIVER:980903}.    {Counseling Resources  [place] Preventive Services Task Force  Cholesterol Screening  Health diet/nutrition  Pooled Cohorts Equation Calculator  USDA's MyPlate  ASA Prophylaxis  Lung CA Screening  Osteoporosis prevention/bone health :434312}  {Prostate Cancer Screening  Consider for men 55-69 per guidance from USPSTF :761948}    Messi Saldana MD  [facility] " Risks:  {Medicare required documentation of substance and opioid use disorders screening :450784}
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
## 2023-08-22 ENCOUNTER — OFFICE VISIT (OUTPATIENT)
Dept: FAMILY MEDICINE | Facility: CLINIC | Age: 78
End: 2023-08-22
Payer: COMMERCIAL

## 2023-08-22 VITALS
SYSTOLIC BLOOD PRESSURE: 136 MMHG | RESPIRATION RATE: 16 BRPM | BODY MASS INDEX: 27.1 KG/M2 | HEART RATE: 62 BPM | WEIGHT: 173 LBS | DIASTOLIC BLOOD PRESSURE: 76 MMHG | TEMPERATURE: 98.1 F

## 2023-08-22 DIAGNOSIS — J45.20 MILD INTERMITTENT ASTHMA WITHOUT COMPLICATION: ICD-10-CM

## 2023-08-22 DIAGNOSIS — Z51.81 MEDICATION MONITORING ENCOUNTER: ICD-10-CM

## 2023-08-22 DIAGNOSIS — R73.03 PREDIABETES: ICD-10-CM

## 2023-08-22 DIAGNOSIS — E78.5 HYPERLIPIDEMIA LDL GOAL <100: ICD-10-CM

## 2023-08-22 DIAGNOSIS — G47.33 OBSTRUCTIVE SLEEP APNEA SYNDROME: ICD-10-CM
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
## 2023-08-22 DIAGNOSIS — I10 HYPERTENSION GOAL BP (BLOOD PRESSURE) < 140/90: ICD-10-CM

## 2023-08-22 DIAGNOSIS — S01.01XD LACERATION OF SCALP, SUBSEQUENT ENCOUNTER: ICD-10-CM

## 2023-08-22 PROCEDURE — 99213 OFFICE O/P EST LOW 20 MIN: CPT | Performed by: FAMILY MEDICINE
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
## 2023-09-07 ENCOUNTER — HOSPITAL ENCOUNTER (OUTPATIENT)
Facility: CLINIC | Age: 78
Discharge: HOME | End: 2023-09-07
Attending: INTERNAL MEDICINE | Admitting: INTERNAL MEDICINE
Payer: COMMERCIAL

## 2023-09-07 VITALS
DIASTOLIC BLOOD PRESSURE: 72 MMHG | HEART RATE: 50 BPM | SYSTOLIC BLOOD PRESSURE: 155 MMHG | OXYGEN SATURATION: 96 % | RESPIRATION RATE: 18 BRPM

## 2023-09-07 LAB — UPPER GI ENDOSCOPY: NORMAL

## 2023-09-07 PROCEDURE — 250N000011 HC RX IP 250 OP 636: Performed by: INTERNAL MEDICINE

## 2023-09-07 PROCEDURE — 43239 EGD BIOPSY SINGLE/MULTIPLE: CPT | Mod: 74 | Performed by: INTERNAL MEDICINE

## 2023-09-07 PROCEDURE — 88305 TISSUE EXAM BY PATHOLOGIST: CPT | Mod: TC | Performed by: INTERNAL MEDICINE

## 2023-09-07 PROCEDURE — 999N000099 HC STATISTIC MODERATE SEDATION < 10 MIN: Performed by: INTERNAL MEDICINE

## 2023-09-07 PROCEDURE — 250N000009 HC RX 250: Performed by: INTERNAL MEDICINE

## 2023-09-07 PROCEDURE — 88305 TISSUE EXAM BY PATHOLOGIST: CPT | Mod: 26 | Performed by: PATHOLOGY

## 2023-09-07 PROCEDURE — 43235 EGD DIAGNOSTIC BRUSH WASH: CPT | Performed by: INTERNAL MEDICINE

## 2023-09-07 RX ORDER — NALOXONE HYDROCHLORIDE 0.4 MG/ML
0.4 INJECTION, SOLUTION INTRAMUSCULAR; INTRAVENOUS; SUBCUTANEOUS
Status: DISCONTINUED | OUTPATIENT
Start: 2023-09-07 | End: 2023-09-07 | Stop reason: HOSPADM

## 2023-09-07 RX ORDER — ONDANSETRON 2 MG/ML
4 INJECTION INTRAMUSCULAR; INTRAVENOUS EVERY 6 HOURS PRN
Status: DISCONTINUED | OUTPATIENT
Start: 2023-09-07 | End: 2023-09-07 | Stop reason: HOSPADM

## 2023-09-07 RX ORDER — PROCHLORPERAZINE MALEATE 5 MG
5 TABLET ORAL EVERY 6 HOURS PRN
Status: DISCONTINUED | OUTPATIENT
Start: 2023-09-07 | End: 2023-09-07 | Stop reason: HOSPADM

## 2023-09-07 RX ORDER — EPINEPHRINE 1 MG/ML
0.1 INJECTION, SOLUTION INTRAMUSCULAR; SUBCUTANEOUS
Status: DISCONTINUED | OUTPATIENT
Start: 2023-09-07 | End: 2023-09-07 | Stop reason: HOSPADM

## 2023-09-07 RX ORDER — ONDANSETRON 2 MG/ML
4 INJECTION INTRAMUSCULAR; INTRAVENOUS
Status: DISCONTINUED | OUTPATIENT
Start: 2023-09-07 | End: 2023-09-07 | Stop reason: HOSPADM

## 2023-09-07 RX ORDER — FLUMAZENIL 0.1 MG/ML
0.2 INJECTION, SOLUTION INTRAVENOUS
Status: DISCONTINUED | OUTPATIENT
Start: 2023-09-07 | End: 2023-09-07 | Stop reason: HOSPADM

## 2023-09-07 RX ORDER — SIMETHICONE 40MG/0.6ML
133 SUSPENSION, DROPS(FINAL DOSAGE FORM)(ML) ORAL
Status: DISCONTINUED | OUTPATIENT
Start: 2023-09-07 | End: 2023-09-07 | Stop reason: HOSPADM

## 2023-09-07 RX ORDER — FENTANYL CITRATE 50 UG/ML
50-100 INJECTION, SOLUTION INTRAMUSCULAR; INTRAVENOUS EVERY 5 MIN PRN
Status: DISCONTINUED | OUTPATIENT
Start: 2023-09-07 | End: 2023-09-07 | Stop reason: HOSPADM

## 2023-09-07 RX ORDER — ATROPINE SULFATE 0.1 MG/ML
1 INJECTION INTRAVENOUS
Status: DISCONTINUED | OUTPATIENT
Start: 2023-09-07 | End: 2023-09-07 | Stop reason: HOSPADM

## 2023-09-07 RX ORDER — DIPHENHYDRAMINE HYDROCHLORIDE 50 MG/ML
25-50 INJECTION INTRAMUSCULAR; INTRAVENOUS
Status: DISCONTINUED | OUTPATIENT
Start: 2023-09-07 | End: 2023-09-07 | Stop reason: HOSPADM

## 2023-09-07 RX ORDER — ONDANSETRON 4 MG/1
4 TABLET, ORALLY DISINTEGRATING ORAL EVERY 6 HOURS PRN
Status: DISCONTINUED | OUTPATIENT
Start: 2023-09-07 | End: 2023-09-07 | Stop reason: HOSPADM

## 2023-09-07 RX ORDER — NALOXONE HYDROCHLORIDE 0.4 MG/ML
0.2 INJECTION, SOLUTION INTRAMUSCULAR; INTRAVENOUS; SUBCUTANEOUS
Status: DISCONTINUED | OUTPATIENT
Start: 2023-09-07 | End: 2023-09-07 | Stop reason: HOSPADM

## 2023-09-07 RX ORDER — LIDOCAINE 40 MG/G
CREAM TOPICAL
Status: DISCONTINUED | OUTPATIENT
Start: 2023-09-07 | End: 2023-09-07 | Stop reason: HOSPADM

## 2023-09-07 RX ADMIN — TOPICAL ANESTHETIC 0.5 ML: 200 SPRAY DENTAL; PERIODONTAL at 10:47

## 2023-09-07 RX ADMIN — MIDAZOLAM 1 MG: 1 INJECTION INTRAMUSCULAR; INTRAVENOUS at 10:47

## 2023-09-07 RX ADMIN — FENTANYL CITRATE 50 MCG: 0.05 INJECTION, SOLUTION INTRAMUSCULAR; INTRAVENOUS at 10:49

## 2023-09-07 ASSESSMENT — ACTIVITIES OF DAILY LIVING (ADL): ADLS_ACUITY_SCORE: 35

## 2023-09-07 NOTE — H&P
Pre-Endoscopy History and Physical     Alexander Alfred MRN# [id number]   YOB: 1945 Age: 78 year old     Date of Procedure: 9/7/2023  Primary care provider: Messi Saldana  Type of Endoscopy: Gastroscopy with possible biopsy, possible dilation  Reason for Procedure: Verde's  Type of Anesthesia Anticipated: Conscious Sedation    HPI:    Alexander is a 78 year old male who will be undergoing the above procedure.      A history and physical has been performed. The patient's medications and allergies have been reviewed. The risks and benefits of the procedure and the sedation options and risks were discussed with the patient.  All questions were answered and informed consent was obtained.      He denies a personal or family history of anesthesia complications or bleeding disorders.     Patient Active Problem List   Diagnosis    Nasal polyps    Asthma, mild intermittent    Sleep apnea    Hyperlipidemia LDL goal <100    Hypertension goal BP (blood pressure) < 140/90    EE (eosinophilic esophagitis)    Osteoarthritis of left hip    Lumbar facet arthropathy    Lumbar degenerative disc disease    Lumbar pain    Left lumbosacral radiculopathy    Spondylolisthesis of lumbar region    Lumbar foraminal stenosis    Central spinal stenosis    Spinal stenosis of lumbar region at multiple levels    Acquired spondylolisthesis    Closed traumatic nondisplaced fracture of distal end of left fibula    Left ankle pain    Ankle fracture    Advanced directives, counseling/discussion    Lumbar stenosis    Abnormal joint on examination    Allergic rhinitis    Carpal tunnel syndrome    HNP (herniated nucleus pulposus), thoracic    Memory loss    Ostium secundum type atrial septal defect    Verde's esophagus        Past Medical History:   Diagnosis Date    Asthma, mild intermittent     Park nicollet asthma    Verde's esophagus 06/2020    Dr Ramirez, EGD due 1 yr    EE (eosinophilic esophagitis) 08/2013    Dr Ramirez     Hyperlipidemia LDL goal <130     Hypertension goal BP (blood pressure) < 140/90     Lumbar degenerative disc disease     Lumbar stenosis     Dr Knight - Calf weakness/atrophy    Nasal polyps 2004    approx 2/year - increased sx from nasal polyps - prednisone 40 mg x 2 days, 20 mg x 2 days, 10mg x 10 days with excellent results    Osteoarthritis of left hip     Other chronic pain     Sensorineural hearing loss (SNHL) of both ears     Dr Hidalgo - R>L    Sleep apnea 2006    moderate - CPAP - 5-15 cm        Past Surgical History:   Procedure Laterality Date    BUNIONECTOMY LAPIDUS WITH TARSAL METATARSAL (TMT) FUSION Right 2/16/2022    Procedure: 1.  Cheilectomy, right foot. 2.  [place]-Green first metatarsal bunionectomy, right foot. 3.  Debridement of joint, right first metatarsophalangeal joint.;  Surgeon: Chato Jeong DPM;  Location: [facility] OR    COLONOSCOPY  2010    normal - due 2020    COLONOSCOPY N/A 10/21/2020    normal, consider 10 yrs    DECOMPRESSION, FUSION LUMBAR POSTERIOR THREE + LEVELS, COMBINED N/A 10/07/2014    Dr Knight - COMBINED DECOMPRESSION, FUSION LUMBAR POSTERIOR THREE + LEVELS    ESOPHAGOSCOPY, GASTROSCOPY, DUODENOSCOPY (EGD), COMBINED  08/07/2013    Dr Ramirez - EE, non erosive gastropathy    ESOPHAGOSCOPY, GASTROSCOPY, DUODENOSCOPY (EGD), COMBINED N/A 06/02/2021    Verde's due 1 yr    ESOPHAGOSCOPY, GASTROSCOPY, DUODENOSCOPY (EGD), COMBINED N/A 7/27/2022    Procedure: ESOPHAGOGASTRODUODENOSCOPY (EGD) (fv) with biopsy by using cold forcep;  Surgeon: Teo Ramirez MD;  Location:  GI    ROTATOR CUFF REPAIR RT/LT Left 2005    Lt shoulder - dr armaan lazar    ROTATOR CUFF REPAIR RT/LT Left 05/2009    Lt shoulder - dr armaan lazar    stress echo  07/2011    normal    STRESS ECHO ([facility]RO)  06/2015    normal stress echo    SURGICAL HISTORY OF -   1982    Lt ankle CRIF    SURGICAL HISTORY OF -   04/2008    blephoplasty    SURGICAL HISTORY OF -   11/2016    T12-L1 - Decompression -   [place] -     TONSILLECTOMY  1950       Social History     Tobacco Use    Smoking status: Never    Smokeless tobacco: Never   Substance Use Topics    Alcohol use: Yes     Comment: 1 drinks per day avg       Family History   Problem Relation Age of Onset    Hypertension Father     C.A.D. Father         smoker    Other Cancer Father         life long smoker    C.A.D. Cousin         cousin - at 45    Hypertension Maternal Grandfather     C.A.D. Maternal Grandfather     Coronary Artery Disease Maternal Uncle     Coronary Artery Disease Maternal Uncle     Colon Cancer No family hx of        Prior to Admission medications    Medication Sig Start Date End Date Taking? Authorizing Provider   albuterol (PROAIR HFA/PROVENTIL HFA/VENTOLIN HFA) 108 (90 Base) MCG/ACT inhaler INHALE 2 PUFFS INTO THE LUNGS EVERY 6 HOURS AS NEEDED FOR SHORTNESS OF BREATH OR DIFFICULT BREATHING OR WHEEZING 9/27/22   Messi Saldana MD   aspirin 81 MG chewable tablet Take 1 tablet (81 mg) by mouth daily 7/1/16   Messi Saldana MD   Coenzyme Q10 (COQ-10) 100 MG CAPS Take 100 mg by mouth daily    Unknown, Entered By History   fluticasone (FLONASE) 50 MCG/ACT nasal spray Spray 2 sprays into both nostrils daily 4/12/23   Messi Saldana MD   ibuprofen (ADVIL/MOTRIN) 600 MG tablet Take 600mg of ibuprofen every 6 hours x 7 days to assist in pain control.  If you are not tolerating the medication, you are ok to stop. 2/16/22   Chato Jeong DPM   KRILL OIL PO Take 1 capsule by mouth daily Taking MegaRed OTC. Strength unknown.    Unknown, Entered By History   losartan-hydrochlorothiazide (HYZAAR) 50-12.5 MG tablet Take 1 tablet by mouth daily 4/12/23   Messi Saldana MD   Multiple Vitamin (MULTIVITAMIN OR) Take 1 tablet by mouth daily     Reported, Patient   omeprazole (PRILOSEC) 20 MG DR capsule Take 1 capsule (20 mg) by mouth 2 times daily 4/12/23   Messi Saldana MD   vitamin D3 (CHOLECALCIFEROL) 50 mcg (2000 units) tablet Take 1 tablet (50 mcg) by mouth 2  "times daily 2/16/22   Chato Jeong DPM       Allergies   Allergen Reactions    Codeine GI Disturbance    Hydrocodone GI Disturbance    Oxycodone GI Disturbance        REVIEW OF SYSTEMS:   5 point ROS negative except as noted above in HPI, including Gen., Resp., CV, GI &  system review.    PHYSICAL EXAM:   BP (!) 164/73   Pulse 51   Resp 10   SpO2 96%  Estimated body mass index is 27.1 kg/m  as calculated from the following:    Height as of 8/21/23: 1.702 m (5' 7\").    Weight as of 8/22/23: 78.5 kg (173 lb).   GENERAL APPEARANCE: alert, and oriented  MENTAL STATUS: alert  AIRWAY EXAM: Mallampatti Class I (visualization of the soft palate, fauces, uvula, anterior and posterior pillars)  RESP: lungs clear to auscultation - no rales, rhonchi or wheezes  CV: regular rates and rhythm  DIAGNOSTICS:    Not indicated    IMPRESSION   ASA Class 2 - Mild systemic disease    PLAN:   Plan for Gastroscopy with possible biopsy, possible dilation. We discussed the risks, benefits and alternatives and the patient wished to proceed.    The above has been forwarded to the consulting provider.      Signed Electronically by: Teo Ramirez MD  September 7, 2023          "

## 2023-09-21 ENCOUNTER — TRANSFERRED RECORDS (OUTPATIENT)
Dept: HEALTH INFORMATION MANAGEMENT | Facility: CLINIC | Age: 78
End: 2023-09-21

## 2023-10-09 ENCOUNTER — THERAPY VISIT (OUTPATIENT)
Dept: PHYSICAL THERAPY | Facility: CLINIC | Age: 78
End: 2023-10-09
Payer: COMMERCIAL

## 2023-10-09 DIAGNOSIS — M54.16 LUMBAR BACK PAIN WITH RADICULOPATHY AFFECTING LEFT LOWER EXTREMITY: Primary | ICD-10-CM

## 2023-10-09 PROCEDURE — 97161 PT EVAL LOW COMPLEX 20 MIN: CPT | Mod: GP | Performed by: PHYSICAL THERAPIST

## 2023-10-09 PROCEDURE — 97110 THERAPEUTIC EXERCISES: CPT | Mod: GP | Performed by: PHYSICAL THERAPIST

## 2023-10-09 NOTE — PROGRESS NOTES
PHYSICAL THERAPY EVALUATION  Type of Visit: Evaluation  History of lumbar fusion of L3-L4 in . 24 pt still notes left lumbar pain and weakness and atrophy in the left gastrocnemius. Pt would like to try skating with his granddaughter and is returning to physical therapy for instruction in exercises to strengthen his back and left gastrocnemiut   See electronic medical record for Abuse and Falls Screening details.    Subjective       Presenting condition or subjective complaint: Learn proper exercises/stretching for home use  Date of onset: 24    Relevant medical history: Concussions; High blood pressure   Dates & types of surgery: Carpel Tunnel:   Bunion surgeryl , L3-L4 fusion,     Prior diagnostic imaging/testing results:       Prior therapy history for the same diagnosis, illness or injury: No      Prior Level of Function  Transfers: Independent  Ambulation: Independent  ADL: Independent  IADL: Driving, Housekeeping    Living Environment  Social support: With a significant other or spouse   Type of home: House   Stairs to enter the home: No       Ramp: No   Stairs inside the home: Yes 14 Is there a railing: Yes   Help at home: None  Equipment owned:       Employment: Not Applicable    Hobbies/Interests: Bicycling, Swimming, Weight lifting, dirt bike riding    Patient goals for therapy: ice skate    Pain assessment: Pain present  Location: left lumbar /Ratin     Objective   LUMBAR SPINE EVALUATION  PAIN: Pain Level at Rest: 2/10  Pain Level with Use: 4/10  Pain Location: left lumbar   Pain is Exacerbated By: standing and walking  Pain is Relieved By: rest  INTEGUMENTARY (edema, incisions):   POSTURE:  increased lordosis in standing  GAIT:   Weightbearing Status:   Assistive Device(s):   Gait Deviations:   BALANCE/PROPRIOCEPTION:  fair  WEIGHTBEARING ALIGNMENT:   NON-WEIGHTBEARING ALIGNMENT:    ROM:  moderate loss of motion flexion and bilateral lateral flexion. Maximum loss of motion  lumbar extension  PELVIC/SI SCREEN:   STRENGTH:  weak lower abdominals, pelvic stabilizers and left gastrocnemius    MYOTOMES:  decreased left S1 myotome  DTR S:   CORD SIGNS:   DERMATOMES:   NEURAL TENSION: Lumbar WNL  FLEXIBILITY:  decreased hamstrings and hip flexors   LUMBAR/HIP Special Tests:    PELVIS/SI SPECIAL TESTS:   FUNCTIONAL TESTS:   PALPATION: WNL  SPINAL SEGMENTAL CONCLUSIONS:       Assessment & Plan   CLINICAL IMPRESSIONS  Medical Diagnosis:      Treatment Diagnosis:     Impression/Assessment: Patient is a 78 year old male with lumbar  complaints.  The following significant findings have been identified: Pain, Decreased ROM/flexibility, and Decreased strength. These impairments interfere with their ability to perform self care tasks, recreational activities, household chores, driving , household mobility, and community mobility as compared to previous level of function.     Clinical Decision Making (Complexity):  Clinical Presentation:   Clinical Presentation Rationale: based on medical and personal factors listed in PT evaluation  Clinical Decision Making (Complexity):     PLAN OF CARE  Treatment Interventions:  Interventions: Therapeutic Exercise    Long Term Goals     PT Goal 1  Goal Identifier: standing  Goal Description: pt able to stand for 15 minutes pain level 1  Rationale: to maximize safety and independence with performance of ADLs and functional tasks;to maximize safety and independence within the home;to maximize safety and independence within the community;to maximize safety and independence with transportation;to maximize safety and independence with self cares  Target Date: 01/01/24      Frequency of Treatment: 2x/mongh  Duration of Treatment: 3 months    Recommended Referrals to Other Professionals:   Education Assessment:   Learner/Method: No Barriers to Learning    Risks and benefits of evaluation/treatment have been explained.   Patient/Family/caregiver agrees with Plan of Care.      Evaluation Time:             Signing Clinician: Jarrett Mccullough, PT      [facility]                                                                                   OUTPATIENT PHYSICAL THERAPY      PLAN OF TREATMENT FOR OUTPATIENT REHABILITATION   Patient's Last Name, First Name, Alexander Doll YOB: 1945   Provider's Name   [facility]   Medical Record No.  [id number]     Onset Date: 09/01/23  Start of Care Date: 10/09/23     Medical Diagnosis:         PT Treatment Diagnosis:    Plan of Treatment  Frequency/Duration: 2x/mongh/ 3 months    Certification date from 10/09/23 to 01/01/24         See note for plan of treatment details and functional goals     Jarrett Mccullough, PT                         I CERTIFY THE NEED FOR THESE SERVICES FURNISHED UNDER        THIS PLAN OF TREATMENT AND WHILE UNDER MY CARE .             Physician Signature               Date    X_____________________________________________________                    Referring Provider:  Referred Self      Initial Assessment  See Epic Evaluation- Start of Care Date: 10/09/23

## 2023-10-18 ENCOUNTER — TRANSFERRED RECORDS (OUTPATIENT)
Dept: HEALTH INFORMATION MANAGEMENT | Facility: CLINIC | Age: 78
End: 2023-10-18
Payer: COMMERCIAL

## 2023-10-24 ASSESSMENT — ENCOUNTER SYMPTOMS: FREQUENCY: 1

## 2023-10-24 ASSESSMENT — ACTIVITIES OF DAILY LIVING (ADL): CURRENT_FUNCTION: NO ASSISTANCE NEEDED

## 2023-10-31 ENCOUNTER — OFFICE VISIT (OUTPATIENT)
Dept: FAMILY MEDICINE | Facility: CLINIC | Age: 78
End: 2023-10-31
Payer: COMMERCIAL

## 2023-10-31 VITALS
BODY MASS INDEX: 28.41 KG/M2 | TEMPERATURE: 97.4 F | RESPIRATION RATE: 16 BRPM | SYSTOLIC BLOOD PRESSURE: 132 MMHG | OXYGEN SATURATION: 97 % | HEIGHT: 67 IN | DIASTOLIC BLOOD PRESSURE: 78 MMHG | WEIGHT: 181 LBS | HEART RATE: 65 BPM

## 2023-10-31 DIAGNOSIS — J45.20 MILD INTERMITTENT ASTHMA WITHOUT COMPLICATION: ICD-10-CM

## 2023-10-31 DIAGNOSIS — M54.50 LUMBAR PAIN: ICD-10-CM

## 2023-10-31 DIAGNOSIS — Z00.00 ENCOUNTER FOR MEDICARE ANNUAL WELLNESS EXAM: ICD-10-CM

## 2023-10-31 DIAGNOSIS — N40.1 BENIGN PROSTATIC HYPERPLASIA WITH WEAK URINARY STREAM: ICD-10-CM

## 2023-10-31 DIAGNOSIS — Z00.00 MEDICARE ANNUAL WELLNESS VISIT, SUBSEQUENT: ICD-10-CM

## 2023-10-31 DIAGNOSIS — Z23 NEED FOR INFLUENZA VACCINATION: ICD-10-CM

## 2023-10-31 DIAGNOSIS — M48.00 CENTRAL SPINAL STENOSIS: ICD-10-CM

## 2023-10-31 DIAGNOSIS — J33.9 NASAL POLYP: ICD-10-CM

## 2023-10-31 DIAGNOSIS — Z12.11 SCREEN FOR COLON CANCER: ICD-10-CM

## 2023-10-31 DIAGNOSIS — M48.062 SPINAL STENOSIS OF LUMBAR REGION WITH NEUROGENIC CLAUDICATION: ICD-10-CM

## 2023-10-31 DIAGNOSIS — M54.16 LUMBAR BACK PAIN WITH RADICULOPATHY AFFECTING LEFT LOWER EXTREMITY: ICD-10-CM

## 2023-10-31 DIAGNOSIS — Z00.00 ROUTINE GENERAL MEDICAL EXAMINATION AT A HEALTH CARE FACILITY: Primary | ICD-10-CM

## 2023-10-31 DIAGNOSIS — M47.816 LUMBAR FACET ARTHROPATHY: ICD-10-CM

## 2023-10-31 DIAGNOSIS — G47.33 OBSTRUCTIVE SLEEP APNEA SYNDROME: ICD-10-CM

## 2023-10-31 DIAGNOSIS — Z12.5 SCREENING FOR PROSTATE CANCER: ICD-10-CM

## 2023-10-31 DIAGNOSIS — M54.17 LEFT LUMBOSACRAL RADICULOPATHY: ICD-10-CM

## 2023-10-31 DIAGNOSIS — F51.01 PRIMARY INSOMNIA: ICD-10-CM

## 2023-10-31 DIAGNOSIS — Z23 NEED FOR COVID-19 VACCINE: ICD-10-CM

## 2023-10-31 DIAGNOSIS — K20.0 EE (EOSINOPHILIC ESOPHAGITIS): ICD-10-CM

## 2023-10-31 DIAGNOSIS — K22.70 BARRETT'S ESOPHAGUS WITHOUT DYSPLASIA: ICD-10-CM

## 2023-10-31 DIAGNOSIS — M48.061 SPINAL STENOSIS OF LUMBAR REGION AT MULTIPLE LEVELS: ICD-10-CM

## 2023-10-31 DIAGNOSIS — I10 HYPERTENSION GOAL BP (BLOOD PRESSURE) < 140/90: ICD-10-CM

## 2023-10-31 DIAGNOSIS — M51.369 LUMBAR DEGENERATIVE DISC DISEASE: ICD-10-CM

## 2023-10-31 DIAGNOSIS — E78.5 HYPERLIPIDEMIA LDL GOAL <100: ICD-10-CM

## 2023-10-31 DIAGNOSIS — Z51.81 MEDICATION MONITORING ENCOUNTER: ICD-10-CM

## 2023-10-31 DIAGNOSIS — M48.061 LUMBAR FORAMINAL STENOSIS: ICD-10-CM

## 2023-10-31 DIAGNOSIS — M43.10 ACQUIRED SPONDYLOLISTHESIS: ICD-10-CM

## 2023-10-31 DIAGNOSIS — R73.03 PREDIABETES: ICD-10-CM

## 2023-10-31 DIAGNOSIS — M51.24 HNP (HERNIATED NUCLEUS PULPOSUS), THORACIC: ICD-10-CM

## 2023-10-31 DIAGNOSIS — R39.12 BENIGN PROSTATIC HYPERPLASIA WITH WEAK URINARY STREAM: ICD-10-CM

## 2023-10-31 LAB
ALBUMIN SERPL BCG-MCNC: 4.3 G/DL (ref 3.5–5.2)
ALBUMIN UR-MCNC: NEGATIVE MG/DL
ALP SERPL-CCNC: 88 U/L (ref 40–129)
ALT SERPL W P-5'-P-CCNC: 38 U/L (ref 0–70)
ANION GAP SERPL CALCULATED.3IONS-SCNC: 10 MMOL/L (ref 7–15)
APPEARANCE UR: CLEAR
AST SERPL W P-5'-P-CCNC: 34 U/L (ref 0–45)
BILIRUB SERPL-MCNC: 0.4 MG/DL
BILIRUB UR QL STRIP: NEGATIVE
BUN SERPL-MCNC: 18.5 MG/DL (ref 8–23)
CALCIUM SERPL-MCNC: 9.4 MG/DL (ref 8.8–10.2)
CHLORIDE SERPL-SCNC: 103 MMOL/L (ref 98–107)
CHOLEST SERPL-MCNC: 173 MG/DL
CK SERPL-CCNC: 304 U/L (ref 39–308)
COLOR UR AUTO: YELLOW
CREAT SERPL-MCNC: 1.12 MG/DL (ref 0.67–1.17)
CREAT UR-MCNC: 96.8 MG/DL
DEPRECATED HCO3 PLAS-SCNC: 28 MMOL/L (ref 22–29)
EGFRCR SERPLBLD CKD-EPI 2021: 68 ML/MIN/1.73M2
ERYTHROCYTE [DISTWIDTH] IN BLOOD BY AUTOMATED COUNT: 12.7 % (ref 10–15)
GLUCOSE SERPL-MCNC: 96 MG/DL (ref 70–99)
GLUCOSE UR STRIP-MCNC: NEGATIVE MG/DL
HBA1C MFR BLD: 5.4 % (ref 0–5.6)
HCT VFR BLD AUTO: 41 % (ref 40–53)
HDLC SERPL-MCNC: 53 MG/DL
HGB BLD-MCNC: 14 G/DL (ref 13.3–17.7)
HGB UR QL STRIP: NEGATIVE
KETONES UR STRIP-MCNC: NEGATIVE MG/DL
LDLC SERPL CALC-MCNC: 92 MG/DL
LEUKOCYTE ESTERASE UR QL STRIP: NEGATIVE
MCH RBC QN AUTO: 32.7 PG (ref 26.5–33)
MCHC RBC AUTO-ENTMCNC: 34.1 G/DL (ref 31.5–36.5)
MCV RBC AUTO: 96 FL (ref 78–100)
MICROALBUMIN UR-MCNC: <12 MG/L
MICROALBUMIN/CREAT UR: NORMAL MG/G{CREAT}
NITRATE UR QL: NEGATIVE
NONHDLC SERPL-MCNC: 120 MG/DL
PH UR STRIP: 6 [PH] (ref 5–7)
PLATELET # BLD AUTO: 266 10E3/UL (ref 150–450)
POTASSIUM SERPL-SCNC: 4.1 MMOL/L (ref 3.4–5.3)
PROT SERPL-MCNC: 7 G/DL (ref 6.4–8.3)
PSA SERPL DL<=0.01 NG/ML-MCNC: 0.94 NG/ML (ref 0–6.5)
RBC # BLD AUTO: 4.28 10E6/UL (ref 4.4–5.9)
SODIUM SERPL-SCNC: 141 MMOL/L (ref 135–145)
SP GR UR STRIP: 1.02 (ref 1–1.03)
TRIGL SERPL-MCNC: 138 MG/DL
TSH SERPL DL<=0.005 MIU/L-ACNC: 2.46 UIU/ML (ref 0.3–4.2)
UROBILINOGEN UR STRIP-ACNC: 0.2 E.U./DL
WBC # BLD AUTO: 6.2 10E3/UL (ref 4–11)

## 2023-10-31 PROCEDURE — G0103 PSA SCREENING: HCPCS | Performed by: FAMILY MEDICINE

## 2023-10-31 PROCEDURE — 90662 IIV NO PRSV INCREASED AG IM: CPT | Performed by: FAMILY MEDICINE

## 2023-10-31 PROCEDURE — 90471 IMMUNIZATION ADMIN: CPT | Performed by: FAMILY MEDICINE

## 2023-10-31 PROCEDURE — 99213 OFFICE O/P EST LOW 20 MIN: CPT | Mod: 25 | Performed by: FAMILY MEDICINE

## 2023-10-31 PROCEDURE — 36415 COLL VENOUS BLD VENIPUNCTURE: CPT | Performed by: FAMILY MEDICINE

## 2023-10-31 PROCEDURE — 81003 URINALYSIS AUTO W/O SCOPE: CPT | Performed by: FAMILY MEDICINE

## 2023-10-31 PROCEDURE — 90480 ADMN SARSCOV2 VAC 1/ONLY CMP: CPT | Performed by: FAMILY MEDICINE

## 2023-10-31 PROCEDURE — G0439 PPPS, SUBSEQ VISIT: HCPCS | Performed by: FAMILY MEDICINE

## 2023-10-31 PROCEDURE — 80053 COMPREHEN METABOLIC PANEL: CPT | Performed by: FAMILY MEDICINE

## 2023-10-31 PROCEDURE — 91320 SARSCV2 VAC 30MCG TRS-SUC IM: CPT | Performed by: FAMILY MEDICINE

## 2023-10-31 PROCEDURE — 85027 COMPLETE CBC AUTOMATED: CPT | Performed by: FAMILY MEDICINE

## 2023-10-31 PROCEDURE — 82043 UR ALBUMIN QUANTITATIVE: CPT | Performed by: FAMILY MEDICINE

## 2023-10-31 PROCEDURE — 80061 LIPID PANEL: CPT | Performed by: FAMILY MEDICINE

## 2023-10-31 PROCEDURE — 82570 ASSAY OF URINE CREATININE: CPT | Performed by: FAMILY MEDICINE

## 2023-10-31 PROCEDURE — 84443 ASSAY THYROID STIM HORMONE: CPT | Performed by: FAMILY MEDICINE

## 2023-10-31 PROCEDURE — 83036 HEMOGLOBIN GLYCOSYLATED A1C: CPT | Performed by: FAMILY MEDICINE

## 2023-10-31 PROCEDURE — 82550 ASSAY OF CK (CPK): CPT | Performed by: FAMILY MEDICINE

## 2023-10-31 RX ORDER — FLUTICASONE PROPIONATE 50 MCG
2 SPRAY, SUSPENSION (ML) NASAL DAILY
Qty: 48 G | Refills: 3 | Status: SHIPPED | OUTPATIENT
Start: 2023-10-31

## 2023-10-31 RX ORDER — TAMSULOSIN HYDROCHLORIDE 0.4 MG/1
0.4 CAPSULE ORAL DAILY
Qty: 90 CAPSULE | Refills: 3 | Status: SHIPPED | OUTPATIENT
Start: 2023-10-31

## 2023-10-31 RX ORDER — LOSARTAN POTASSIUM AND HYDROCHLOROTHIAZIDE 12.5; 5 MG/1; MG/1
1 TABLET ORAL DAILY
Qty: 90 TABLET | Refills: 3 | Status: SHIPPED | OUTPATIENT
Start: 2023-10-31

## 2023-10-31 ASSESSMENT — ACTIVITIES OF DAILY LIVING (ADL): CURRENT_FUNCTION: NO ASSISTANCE NEEDED

## 2023-10-31 ASSESSMENT — ASTHMA QUESTIONNAIRES: ACT_TOTALSCORE: 25

## 2023-10-31 ASSESSMENT — ENCOUNTER SYMPTOMS: FREQUENCY: 1

## 2023-10-31 NOTE — PROGRESS NOTES
"[facility]    KUMAR Emery is a 78 year old who presents for Preventive Visit.      10/31/2023     7:51 AM   Additional Questions   Roomed by Stephnaie       Are you in the first 12 months of your Medicare coverage?  No    Healthy Habits:     In general, how would you rate your overall health?  Excellent    Frequency of exercise:  2-3 days/week    Duration of exercise:  15-30 minutes    Do you usually eat at least 4 servings of fruit and vegetables a day, include whole grains    & fiber and avoid regularly eating high fat or \"junk\" foods?  No    Taking medications regularly:  Yes    Medication side effects:  Not applicable    Ability to successfully perform activities of daily living:  No assistance needed    Home Safety:  No safety concerns identified    Hearing Impairment:  Need to ask people to speak up or repeat themselves    In the past 6 months, have you been bothered by leaking of urine?  No    In general, how would you rate your overall mental or emotional health?  Good    Additional concerns today:  Yes      Today's PHQ-2 Score:       10/31/2023     7:48 AM   PHQ-2 ( 1999 Pfizer)   Q1: Little interest or pleasure in doing things 0   Q2: Feeling down, depressed or hopeless 0   PHQ-2 Score 0   Q1: Little interest or pleasure in doing things Not at all   Q2: Feeling down, depressed or hopeless Not at all   PHQ-2 Score 0     Have you ever done Advance Care Planning? (For example, a Health Directive, POLST, or a discussion with a medical provider or your loved ones about your wishes): Yes, advance care planning is on file.    Fall risk  Fallen 2 or more times in the past year?: Yes  Any fall with injury in the past year?: Yes    Cognitive Screening   1) Repeat 3 items (Leader, Season, Table)    2) Clock draw: NORMAL  3) 3 item recall: Recalls 2 objects   Results: NORMAL clock, 1-2 items recalled: COGNITIVE IMPAIRMENT LESS LIKELY    Mini-CogTM Copyright S Carlie. Licensed by the author for " use in [facility]; reprinted with permission (jean paul@[facility]). All rights reserved.      Do you have sleep apnea, excessive snoring or daytime drowsiness? : no    Reviewed and updated as needed this visit by clinical staff   Tobacco  Allergies  Meds  Problems  Med Hx  Surg Hx  Fam Hx          Reviewed and updated as needed this visit by Provider                 Social History     Tobacco Use    Smoking status: Never    Smokeless tobacco: Never   Substance Use Topics    Alcohol use: Yes     Alcohol/week: 7.0 standard drinks of alcohol     Types: 7 Standard drinks or equivalent per week     Comment: 1 drinks per day avg             10/24/2023    12:26 PM   Alcohol Use   Prescreen: >3 drinks/day or >7 drinks/week? No     Do you have a current opioid prescription? No  Do you use any other controlled substances or medications that are not prescribed by a provider? None      Prediabetes    Lab Results   Component Value Date    A1C 5.4 10/31/2023    A1C 5.3 09/29/2022    A1C 5.5 12/24/2021    A1C 5.2 08/16/2021    A1C 5.3 09/09/2020    A1C 5.3 06/15/2015     Hypertension Follow-up    Do you check your blood pressure regularly outside of the clinic? Yes   Are you following a low salt diet? Yes  Are your blood pressures ever more than 140 on the top number (systolic) OR more   than 90 on the bottom number (diastolic), for example 140/90? No    BP Readings from Last 3 Encounters:   10/31/23 132/78   09/07/23 (!) 155/72   08/22/23 136/76     Creatinine   Date Value Ref Range Status   09/29/2022 0.99 0.66 - 1.25 mg/dL Final   09/09/2020 1.15 0.66 - 1.25 mg/dL Final     GFR Estimate   Date Value Ref Range Status   09/29/2022 79 >60 mL/min/1.73m2 Final     Comment:     Effective December 21, 2021 eGFRcr in adults is calculated using the 2021 CKD-EPI creatinine equation which includes age and gender (Paulie et al., NEJM, DOI: 10.1056/ZRMPdg5556488)   09/09/2020 62 >60 mL/min/[1.73_m2] Final     Comment:     Non   GFR Calc  Starting 12/18/2018, serum creatinine based estimated GFR (eGFR) will be   calculated using the Chronic Kidney Disease Epidemiology Collaboration   (CKD-EPI) equation.       Lipids    Recent Labs   Lab Test 09/29/22  0854 08/16/21  0919   CHOL 145 159   HDL 48 55   LDL 65 82   TRIG 162* 111     Asthma        9/26/2022     3:27 PM 8/14/2023     1:16 PM 10/31/2023     9:09 AM   ACT Total Scores   ACT TOTAL SCORE (Goal Greater than or Equal to 20) 25 25 25   In the past 12 months, how many times did you visit the emergency room for your asthma without being admitted to the hospital? 0 0 0   In the past 12 months, how many times were you hospitalized overnight because of your asthma? 0 0 0     AMBER / Insomnia - getting 7 hrs / night - using CPAP    GERD / Verde's / EE - recent visit with Dr Cormier - long term Omeprazole - Last EGD 9/2023    Chronic LBP - difficult with prolonged standing - chronic LLE weakness    Bowels / Bladder - bowels improving, difficulty starting flow, not steady stream, no current meds, am coffee x 2 cups - nocturia 1-2 times per week    Current providers sharing in care for this patient include:     Patient Care Team:  Messi Saldana MD as PCP - General  Messi Saldana MD as Assigned PCP  Jose Carlos Roach MD as Assigned Neuroscience Provider  Toribio Alfaro MD as Assigned Musculoskeletal Provider    The following health maintenance items are reviewed in Epic and correct as of today:  Health Maintenance   Topic Date Due    RSV VACCINE 60+ (1 - 1-dose 60+ series) Never done    ASTHMA ACTION PLAN  09/26/2023    BMP  09/29/2023    LIPID  09/29/2023    MICROALBUMIN  09/29/2023    PSA  09/29/2023    ASTHMA CONTROL TEST  04/30/2024    MEDICARE ANNUAL WELLNESS VISIT  10/31/2024    ANNUAL REVIEW OF HM ORDERS  10/31/2024    FALL RISK ASSESSMENT  10/31/2024    ADVANCE CARE PLANNING  10/31/2028    COLORECTAL CANCER SCREENING  10/21/2030    DTAP/TDAP/TD IMMUNIZATION (5 - Td or Tdap)  08/22/2033    HEPATITIS C SCREENING  Completed    PHQ-2 (once per calendar year)  Completed    INFLUENZA VACCINE  Completed    Pneumococcal Vaccine: 65+ Years  Completed    ZOSTER IMMUNIZATION  Completed    COVID-19 Vaccine  Completed    IPV IMMUNIZATION  Aged Out    HPV IMMUNIZATION  Aged Out    MENINGITIS IMMUNIZATION  Aged Out     Review of Systems   Genitourinary:  Positive for frequency and urgency.     Health Maintenance     Colonoscopy:  last 10/2020 - negative   FIT:  desires              PSA:  desires   DEXA:  NA    Health Maintenance Due   Topic Date Due    RSV VACCINE 60+ (1 - 1-dose 60+ series) Never done    ASTHMA ACTION PLAN  09/26/2023    BMP  09/29/2023    LIPID  09/29/2023    MICROALBUMIN  09/29/2023    PSA  09/29/2023       Current Problem List    Patient Active Problem List   Diagnosis    Nasal polyps    Asthma, mild intermittent    Sleep apnea    Hyperlipidemia LDL goal <100    Hypertension goal BP (blood pressure) < 140/90    EE (eosinophilic esophagitis)    Osteoarthritis of left hip    Lumbar facet arthropathy    Lumbar degenerative disc disease    Lumbar pain    Lumbar back pain with radiculopathy affecting left lower extremity    Spondylolisthesis of lumbar region    Lumbar foraminal stenosis    Central spinal stenosis    Spinal stenosis of lumbar region at multiple levels    Acquired spondylolisthesis    Closed traumatic nondisplaced fracture of distal end of left fibula    Left ankle pain    Ankle fracture    Advanced directives, counseling/discussion    Lumbar stenosis    Abnormal joint on examination    Allergic rhinitis    Carpal tunnel syndrome    HNP (herniated nucleus pulposus), thoracic    Memory loss    Ostium secundum type atrial septal defect    Verde's esophagus       Past Medical History    Past Medical History:   Diagnosis Date    Asthma, mild intermittent     [place] asthma    Verde's esophagus 06/2020    Dr Ramirez, EGD due 1 yr    EE (eosinophilic esophagitis)  08/2013    Dr Ramirez    Hyperlipidemia LDL goal <130     Hypertension goal BP (blood pressure) < 140/90     Lumbar degenerative disc disease     Lumbar stenosis     Dr Knight - Calf weakness/atrophy    Nasal polyps 2004    approx 2/year - increased sx from nasal polyps - prednisone 40 mg x 2 days, 20 mg x 2 days, 10mg x 10 days with excellent results    Osteoarthritis of left hip     Other chronic pain     Sensorineural hearing loss (SNHL) of both ears     Dr Hidalgo - R>L    Sleep apnea 2006    moderate - CPAP - 5-15 cm       Past Surgical History    Past Surgical History:   Procedure Laterality Date    BUNIONECTOMY LAPIDUS WITH TARSAL METATARSAL (TMT) FUSION Right 02/16/2022    Procedure: 1.  Cheilectomy, right foot. 2.  [place] first metatarsal bunionectomy, right foot. 3.  Debridement of joint, right first metatarsophalangeal joint.;  Surgeon: Chato Jeong DPM;  Location: [facility] OR    COLONOSCOPY  2010    normal - due 2020    COLONOSCOPY N/A 10/21/2020    normal, consider 10 yrs    DECOMPRESSION, FUSION LUMBAR POSTERIOR THREE + LEVELS, COMBINED N/A 10/07/2014    Dr Knight - COMBINED DECOMPRESSION, FUSION LUMBAR POSTERIOR THREE + LEVELS    ESOPHAGOSCOPY, GASTROSCOPY, DUODENOSCOPY (EGD), COMBINED  08/07/2013    Dr Ramirez - EE, non erosive gastropathy    ESOPHAGOSCOPY, GASTROSCOPY, DUODENOSCOPY (EGD), COMBINED N/A 06/02/2021    Verde's due 1 yr    ESOPHAGOSCOPY, GASTROSCOPY, DUODENOSCOPY (EGD), COMBINED N/A 07/27/2022    Procedure: ESOPHAGOGASTRODUODENOSCOPY (EGD) (fv) with biopsy by using cold forcep;  Surgeon: Teo Ramirez MD;  Location: [facility]    ESOPHAGOSCOPY, GASTROSCOPY, DUODENOSCOPY (EGD), COMBINED N/A 09/07/2023    Procedure: Esophagoscopy, gastroscopy, duodenoscopy (EGD), combined;  Surgeon: Teo Ramirez MD;  Location: [facility] GI    ROTATOR CUFF REPAIR RT/LT Left 2005    Lt shoulder - dr armaan lazar    ROTATOR CUFF REPAIR RT/LT Left 05/2009    Lt shoulder - dr armaan lazar     stress echo  07/2011    normal    STRESS ECHO (METRO)  06/2015    normal stress echo    SURGICAL HISTORY OF -   1982    Lt ankle CRIF    SURGICAL HISTORY OF -   04/2008    blephoplasty    SURGICAL HISTORY OF -   11/2016    T12-L1 - Decompression - Dr Knight -     SURGICAL HISTORY OF -  Right 2022    Rt CTS - [facility]    TONSILLECTOMY  1950       Current Medications    Current Outpatient Medications   Medication Sig Dispense Refill    aspirin 81 MG chewable tablet Take 1 tablet (81 mg) by mouth daily 108 tablet 3    Coenzyme Q10 (COQ-10) 100 MG CAPS Take 100 mg by mouth daily      fluticasone (FLONASE) 50 MCG/ACT nasal spray Spray 2 sprays into both nostrils daily 48 g 3    KRILL OIL PO Take 1 capsule by mouth daily Taking MegaRed OTC. Strength unknown.      losartan-hydrochlorothiazide (HYZAAR) 50-12.5 MG tablet Take 1 tablet by mouth daily 90 tablet 3    Multiple Vitamin (MULTIVITAMIN OR) Take 1 tablet by mouth daily       omeprazole (PRILOSEC) 20 MG DR capsule Take 1 capsule (20 mg) by mouth 2 times daily 180 capsule 3    tamsulosin (FLOMAX) 0.4 MG capsule Take 1 capsule (0.4 mg) by mouth daily 90 capsule 3    vitamin D3 (CHOLECALCIFEROL) 50 mcg (2000 units) tablet Take 1 tablet (50 mcg) by mouth 2 times daily 180 tablet 0    albuterol (PROAIR HFA/PROVENTIL HFA/VENTOLIN HFA) 108 (90 Base) MCG/ACT inhaler INHALE 2 PUFFS INTO THE LUNGS EVERY 6 HOURS AS NEEDED FOR SHORTNESS OF BREATH OR DIFFICULT BREATHING OR WHEEZING 54 g 0    ibuprofen (ADVIL/MOTRIN) 600 MG tablet Take 600mg of ibuprofen every 6 hours x 7 days to assist in pain control.  If you are not tolerating the medication, you are ok to stop. 28 tablet 0       Allergies    Allergies   Allergen Reactions    Codeine GI Disturbance    Hydrocodone GI Disturbance    Oxycodone GI Disturbance       Immunizations    Immunization History   Administered Date(s) Administered    COVID-19 12+ (2023-24) (Pfizer) 10/31/2023    COVID-19 Bivalent 12+ (Pfizer) 12/16/2022     COVID-19 Monovalent 18+ (Moderna) 07/25/2022    COVID-19 Vaccine (Mahi) 03/11/2021, 12/03/2021    DT (PEDS <7y) 04/03/1998    Flu, Unspecified 11/29/1999, 12/31/2001, 10/23/2006    Influenza (H1N1) 12/07/2009    Influenza (High Dose) 3 valent vaccine 10/31/2011, 01/08/2013, 09/24/2014, 12/03/2015, 10/11/2016, 11/09/2017    Influenza (IIV3) PF 11/01/2013    Influenza (intradermal) 10/10/2018    Influenza Vaccine 65+ (FLUAD) 12/03/2021    Influenza Vaccine 65+ (Fluzone HD) 09/09/2020, 12/16/2022, 10/31/2023    Influenza Vaccine >6 months (Alfuria,Fluzone) 10/12/2013, 09/24/2014    Influenza Vaccine IM Ages 6-35 Months 4 Valent (PF) 10/12/2013    Pneumo Conj 13-V (2010&after) 06/15/2015    Pneumococcal 23 valent 10/31/2011    TD,PF 7+ (Tenivac) 01/01/2004, 06/21/2012    TDAP (Adacel,Boostrix) 06/12/2006, 08/22/2023    TDAP Vaccine (Boostrix) 06/21/2012    Zoster recombinant adjuvanted (SHINGRIX) 11/25/2020, 02/25/2021    Zoster vaccine, live 12/03/2015       Family History    Family History   Problem Relation Age of Onset    Hypertension Father     C.A.D. Father         smoker    Other Cancer Father         life long smoker    C.A.D. Cousin         cousin - at 45    Hypertension Maternal Grandfather     C.A.D. Maternal Grandfather     Coronary Artery Disease Maternal Uncle     Coronary Artery Disease Maternal Uncle     Colon Cancer No family hx of        Social History    Social History     Socioeconomic History    Marital status:      Spouse name: Valeria    Number of children: 3    Years of education: 16    Highest education level: Not on file   Occupational History     Employer: [employer]   Tobacco Use    Smoking status: Never    Smokeless tobacco: Never   Vaping Use    Vaping Use: Never used   Substance and Sexual Activity    Alcohol use: Yes     Alcohol/week: 7.0 standard drinks of alcohol     Types: 7 Standard drinks or equivalent per week     Comment: 1 drinks per day avg    Drug use: No    Sexual activity:  Yes     Partners: Female     Birth control/protection: Male Surgical   Other Topics Concern    Parent/sibling w/ CABG, MI or angioplasty before 65F 55M? No     Service Not Asked    Blood Transfusions Not Asked    Caffeine Concern Yes     Comment: 2-3 cups daily    Occupational Exposure Not Asked    Hobby Hazards Not Asked    Sleep Concern Not Asked    Stress Concern Not Asked    Weight Concern Not Asked    Special Diet Not Asked    Back Care Not Asked    Exercise Yes     Comment: 3+/wk, landscaping, Mtn Bike riding    Bike Helmet Not Asked    Seat Belt Yes    Self-Exams Not Asked   Social History Narrative    Not on file     Social Determinants of Health     Financial Resource Strain: Low Risk  (10/24/2023)    Financial Resource Strain     Within the past 12 months, have you or your family members you live with been unable to get utilities (heat, electricity) when it was really needed?: No   Food Insecurity: Low Risk  (10/24/2023)    Food Insecurity     Within the past 12 months, did you worry that your food would run out before you got money to buy more?: No     Within the past 12 months, did the food you bought just not last and you didn t have money to get more?: No   Transportation Needs: Low Risk  (10/24/2023)    Transportation Needs     Within the past 12 months, has lack of transportation kept you from medical appointments, getting your medicines, non-medical meetings or appointments, work, or from getting things that you need?: No   Physical Activity: Not on file   Stress: Not on file   Social Connections: Not on file   Interpersonal Safety: Low Risk  (10/31/2023)    Interpersonal Safety     Do you feel physically and emotionally safe where you currently live?: Yes     Within the past 12 months, have you been hit, slapped, kicked or otherwise physically hurt by someone?: No     Within the past 12 months, have you been humiliated or emotionally abused in other ways by your partner or ex-partner?: No  "  Housing Stability: Low Risk  (10/24/2023)    Housing Stability     Do you have housing? : Yes     Are you worried about losing your housing?: No       ROS    CONSTITUTIONAL: NEGATIVE for fever, chills, change in weight  INTEGUMENTARY/SKIN: NEGATIVE for worrisome rashes, moles or lesions  EYES: NEGATIVE for vision changes or irritation  ENT/MOUTH: NEGATIVE for ear, mouth and throat problems  RESP: NEGATIVE for significant cough or SOB  CV: NEGATIVE for chest pain, palpitations or peripheral edema  GI: NEGATIVE for nausea, abdominal pain, heartburn, or change in bowel habits  : NEGATIVE for frequency, dysuria, or hematuria  MUSCULOSKELETAL: NEGATIVE for significant arthralgias or myalgia  NEURO: NEGATIVE for weakness, dizziness or paresthesias  ENDOCRINE: NEGATIVE for temperature intolerance, skin/hair changes  HEME: NEGATIVE for bleeding problems  PSYCHIATRIC: NEGATIVE for changes in mood or affect    OBJECTIVE    /78   Pulse 65   Temp 97.4  F (36.3  C) (Tympanic)   Resp 16   Ht 1.702 m (5' 7\")   Wt 82.1 kg (181 lb)   SpO2 97%   BMI 28.35 kg/m      EXAM:    GENERAL: healthy, alert and no distress  EYES: Eyes grossly normal to inspection, PERRL and conjunctivae and sclerae normal  HENT: ear canals and TM's normal, nose and mouth without ulcers or lesions  NECK: no adenopathy, no asymmetry, masses, or scars and thyroid normal to palpation  RESP: lungs clear to auscultation - no rales, rhonchi or wheezes  CV: regular rate and rhythm, normal S1 S2, no S3 or S4, no murmur, click or rub, no peripheral edema and peripheral pulses strong  ABDOMEN: soft, nontender, no hepatosplenomegaly, no masses and bowel sounds normal   (male): testicles normal without atrophy or masses, no hernias, and penis normal without urethral discharge  RECTAL: normal sphincter tone, no rectal masses, prostate smooth, nontender without masses/nodules, and prostate 1-2+ enlarged, nontender  MS: no gross musculoskeletal defects " noted, no edema - left calf decreased size/strength  SKIN: no suspicious lesions or rashes  NEURO: Normal strength and tone, mentation intact and speech normal  PSYCH: mentation appears normal, affect normal/bright  LYMPH: no cervical, supraclavicular, axillary, or inguinal adenopathy    DIAGNOSTICS/PROCEDURES    Pending    ASSESSMENT      ICD-10-CM    1. Routine general medical examination at a health care facility  Z00.00 Comprehensive metabolic panel     Lipid panel reflex to direct LDL Fasting     CBC with platelets     CK total     UA Macroscopic with reflex to Microscopic and Culture     Albumin Random Urine Quantitative with Creat Ratio     TSH with free T4 reflex     Prostate Specific Antigen Screen     Fecal colorectal cancer screen FIT     Hemoglobin A1c     REVIEW OF HEALTH MAINTENANCE PROTOCOL ORDERS     Comprehensive metabolic panel     Lipid panel reflex to direct LDL Fasting     CBC with platelets     CK total     UA Macroscopic with reflex to Microscopic and Culture     Albumin Random Urine Quantitative with Creat Ratio     TSH with free T4 reflex     Prostate Specific Antigen Screen     Fecal colorectal cancer screen FIT     Hemoglobin A1c     fluticasone (FLONASE) 50 MCG/ACT nasal spray     losartan-hydrochlorothiazide (HYZAAR) 50-12.5 MG tablet     omeprazole (PRILOSEC) 20 MG DR capsule     PRIMARY CARE FOLLOW-UP SCHEDULING      2. Medicare annual wellness visit, subsequent  Z00.00 Comprehensive metabolic panel     Lipid panel reflex to direct LDL Fasting     CBC with platelets     CK total     UA Macroscopic with reflex to Microscopic and Culture     Albumin Random Urine Quantitative with Creat Ratio     TSH with free T4 reflex     Prostate Specific Antigen Screen     Fecal colorectal cancer screen FIT     Hemoglobin A1c     REVIEW OF HEALTH MAINTENANCE PROTOCOL ORDERS     Comprehensive metabolic panel     Lipid panel reflex to direct LDL Fasting     CBC with platelets     CK total     UA  Macroscopic with reflex to Microscopic and Culture     Albumin Random Urine Quantitative with Creat Ratio     TSH with free T4 reflex     Prostate Specific Antigen Screen     Fecal colorectal cancer screen FIT     Hemoglobin A1c     PRIMARY CARE FOLLOW-UP SCHEDULING      3. Prediabetes  R73.03 Comprehensive metabolic panel     Lipid panel reflex to direct LDL Fasting     UA Macroscopic with reflex to Microscopic and Culture     Albumin Random Urine Quantitative with Creat Ratio     Hemoglobin A1c     REVIEW OF HEALTH MAINTENANCE PROTOCOL ORDERS     Comprehensive metabolic panel     Lipid panel reflex to direct LDL Fasting     UA Macroscopic with reflex to Microscopic and Culture     Albumin Random Urine Quantitative with Creat Ratio     Hemoglobin A1c     losartan-hydrochlorothiazide (HYZAAR) 50-12.5 MG tablet     PRIMARY CARE FOLLOW-UP SCHEDULING     OFFICE/OUTPT VISIT,EST,LEVL IV      4. Hypertension goal BP (blood pressure) < 140/90  I10 Comprehensive metabolic panel     UA Macroscopic with reflex to Microscopic and Culture     Albumin Random Urine Quantitative with Creat Ratio     REVIEW OF HEALTH MAINTENANCE PROTOCOL ORDERS     Comprehensive metabolic panel     UA Macroscopic with reflex to Microscopic and Culture     Albumin Random Urine Quantitative with Creat Ratio     losartan-hydrochlorothiazide (HYZAAR) 50-12.5 MG tablet     PRIMARY CARE FOLLOW-UP SCHEDULING     OFFICE/OUTPT VISIT,EST,LEVL IV      5. Hyperlipidemia LDL goal <100  E78.5 Comprehensive metabolic panel     Lipid panel reflex to direct LDL Fasting     CK total     REVIEW OF HEALTH MAINTENANCE PROTOCOL ORDERS     Comprehensive metabolic panel     Lipid panel reflex to direct LDL Fasting     CK total     PRIMARY CARE FOLLOW-UP SCHEDULING     OFFICE/OUTPT VISIT,EST,LEVL IV      6. Mild intermittent asthma without complication  J45.20 REVIEW OF HEALTH MAINTENANCE PROTOCOL ORDERS     PRIMARY CARE FOLLOW-UP SCHEDULING     OFFICE/OUTPT VISIT,EST,LEVL  IV      7. Obstructive sleep apnea syndrome  G47.33 TSH with free T4 reflex     REVIEW OF HEALTH MAINTENANCE PROTOCOL ORDERS     TSH with free T4 reflex     fluticasone (FLONASE) 50 MCG/ACT nasal spray     PRIMARY CARE FOLLOW-UP SCHEDULING     OFFICE/OUTPT VISIT,EST,LEVL IV      8. Nasal polyps  J33.9 REVIEW OF HEALTH MAINTENANCE PROTOCOL ORDERS     fluticasone (FLONASE) 50 MCG/ACT nasal spray     PRIMARY CARE FOLLOW-UP SCHEDULING     OFFICE/OUTPT VISIT,EST,LEVL IV      9. Vedre's esophagus without dysplasia  K22.70 CBC with platelets     REVIEW OF HEALTH MAINTENANCE PROTOCOL ORDERS     CBC with platelets     omeprazole (PRILOSEC) 20 MG DR capsule     PRIMARY CARE FOLLOW-UP SCHEDULING     OFFICE/OUTPT VISIT,EST,LEVL IV      10. EE (eosinophilic esophagitis)  K20.0 CBC with platelets     REVIEW OF HEALTH MAINTENANCE PROTOCOL ORDERS     CBC with platelets     omeprazole (PRILOSEC) 20 MG DR capsule     PRIMARY CARE FOLLOW-UP SCHEDULING     OFFICE/OUTPT VISIT,EST,LEVL IV      11. Spinal stenosis of lumbar region with neurogenic claudication  M48.062 REVIEW OF HEALTH MAINTENANCE PROTOCOL ORDERS     PRIMARY CARE FOLLOW-UP SCHEDULING     OFFICE/OUTPT VISIT,EST,LEVL IV      12. Lumbar foraminal stenosis  M48.061 REVIEW OF HEALTH MAINTENANCE PROTOCOL ORDERS     PRIMARY CARE FOLLOW-UP SCHEDULING     OFFICE/OUTPT VISIT,EST,LEVL IV      13. Central spinal stenosis  M48.00 REVIEW OF HEALTH MAINTENANCE PROTOCOL ORDERS     PRIMARY CARE FOLLOW-UP SCHEDULING     OFFICE/OUTPT VISIT,EST,LEVL IV      14. Spinal stenosis of lumbar region at multiple levels  M48.061 REVIEW OF HEALTH MAINTENANCE PROTOCOL ORDERS     PRIMARY CARE FOLLOW-UP SCHEDULING     OFFICE/OUTPT VISIT,EST,LEVL IV      15. Lumbar back pain with radiculopathy affecting left lower extremity  M54.16 REVIEW OF HEALTH MAINTENANCE PROTOCOL ORDERS     PRIMARY CARE FOLLOW-UP SCHEDULING     OFFICE/OUTPT VISIT,EST,LEVL IV      16. Left lumbosacral radiculopathy  M54.17 REVIEW  OF HEALTH MAINTENANCE PROTOCOL ORDERS     PRIMARY CARE FOLLOW-UP SCHEDULING     OFFICE/OUTPT VISIT,EST,LEVL IV      17. Lumbar facet arthropathy  M47.816 REVIEW OF HEALTH MAINTENANCE PROTOCOL ORDERS     PRIMARY CARE FOLLOW-UP SCHEDULING     OFFICE/OUTPT VISIT,EST,LEVL IV      18. Acquired spondylolisthesis  M43.10 REVIEW OF HEALTH MAINTENANCE PROTOCOL ORDERS     PRIMARY CARE FOLLOW-UP SCHEDULING     OFFICE/OUTPT VISIT,EST,LEVL IV      19. Lumbar degenerative disc disease  M51.36 REVIEW OF HEALTH MAINTENANCE PROTOCOL ORDERS     PRIMARY CARE FOLLOW-UP SCHEDULING     OFFICE/OUTPT VISIT,EST,LEVL IV      20. HNP (herniated nucleus pulposus), thoracic  M51.24 REVIEW OF HEALTH MAINTENANCE PROTOCOL ORDERS     PRIMARY CARE FOLLOW-UP SCHEDULING     OFFICE/OUTPT VISIT,EST,LEVL IV      21. Lumbar pain  M54.50 REVIEW OF HEALTH MAINTENANCE PROTOCOL ORDERS     PRIMARY CARE FOLLOW-UP SCHEDULING     OFFICE/OUTPT VISIT,EST,LEVL IV      22. Primary insomnia  F51.01 REVIEW OF HEALTH MAINTENANCE PROTOCOL ORDERS     PRIMARY CARE FOLLOW-UP SCHEDULING     OFFICE/OUTPT VISIT,EST,LEVL IV      23. Screening for prostate cancer  Z12.5 Hemoglobin A1c     REVIEW OF HEALTH MAINTENANCE PROTOCOL ORDERS     Hemoglobin A1c     PRIMARY CARE FOLLOW-UP SCHEDULING     OFFICE/OUTPT VISIT,EST,LEVL IV      24. Benign prostatic hyperplasia with weak urinary stream  N40.1 REVIEW OF HEALTH MAINTENANCE PROTOCOL ORDERS    R39.12 tamsulosin (FLOMAX) 0.4 MG capsule     PRIMARY CARE FOLLOW-UP SCHEDULING     OFFICE/OUTPT VISIT,EST,LEVL IV      25. Screen for colon cancer  Z12.11 Fecal colorectal cancer screen FIT     REVIEW OF HEALTH MAINTENANCE PROTOCOL ORDERS     Fecal colorectal cancer screen FIT     PRIMARY CARE FOLLOW-UP SCHEDULING     OFFICE/OUTPT VISIT,EST,LEVL IV      26. Medication monitoring encounter  Z51.81 Comprehensive metabolic panel     Lipid panel reflex to direct LDL Fasting     CBC with platelets     CK total     UA Macroscopic with reflex to  Microscopic and Culture     Albumin Random Urine Quantitative with Creat Ratio     TSH with free T4 reflex     Prostate Specific Antigen Screen     REVIEW OF HEALTH MAINTENANCE PROTOCOL ORDERS     Comprehensive metabolic panel     Lipid panel reflex to direct LDL Fasting     CBC with platelets     CK total     UA Macroscopic with reflex to Microscopic and Culture     Albumin Random Urine Quantitative with Creat Ratio     TSH with free T4 reflex     Prostate Specific Antigen Screen     PRIMARY CARE FOLLOW-UP SCHEDULING     OFFICE/OUTPT VISIT,EST,LEVL IV      27. Need for COVID-19 vaccine  Z23 COVID-19 12+ (2023-24) (PFIZER)     REVIEW OF HEALTH MAINTENANCE PROTOCOL ORDERS     PRIMARY CARE FOLLOW-UP SCHEDULING     OFFICE/OUTPT VISIT,EST,LEVL IV      28. Need for influenza vaccination  Z23 INFLUENZA VACCINE 65+ (FLUZONE HD)     REVIEW OF HEALTH MAINTENANCE PROTOCOL ORDERS     PRIMARY CARE FOLLOW-UP SCHEDULING     OFFICE/OUTPT VISIT,EST,LEVL IV      29. Encounter for Medicare annual wellness exam  Z00.00 OFFICE/OUTPT VISIT,EST,LEVL IV          PLAN    Discussed treatment/modality options, including risk and benefits, he desires:    advised alcohol consumption 1oz per day or less, advised aspirin 81 mg po daily, advised 1 multivitamin per day, advised calcium 8800-0611 mg/d and Vitamin D 800-1200 IU/d, advised dentist every 6 months, advised diet and exercise, advised opthalmologist every 1-2 years, advised self testicular exam q month, further health care maintenance, further lab(s), immunization(s), medication refill(s), new medication(s), and observation    Discussed controversies surrounding PSA. Specifically reviewed 2017 USPSTF findings recommending discussion of PSA testing for men ages 55-69.  Reviewed findings of the  Randomized Study of Screening for Prostate Cancer which showed a 30% reduction in advanced stage prostate cancer and a 20% reduction in death rate from prostate cancer in this age group.  "PSA-based screening may prevent up to 2 deaths and up to 3 cases of metastatic disease per 1,000 men screened over 13 years.    We've elected to do PSA this year after discussing these controversies.    All diagnosis above reviewed and noted above, otherwise stable.      [facility]      1) med refills, add flomax    2) labs pending    3) immunizations - Covid/flu today, advise prevnar 20 / RSV    4) upcoming IOL - Dr Canas    5) recent visit with Dr Knight, MRI pending    Return in about 2 weeks (around 11/14/2023) for Pre Op.    Health Maintenance Due   Topic Date Due    RSV VACCINE 60+ (1 - 1-dose 60+ series) Never done    ASTHMA ACTION PLAN  09/26/2023    BMP  09/29/2023    LIPID  09/29/2023    MICROALBUMIN  09/29/2023    PSA  09/29/2023       COUNSELING    Reviewed preventive health counseling, as reflected in patient instructions    BP Readings from Last 1 Encounters:   10/31/23 132/78     Estimated body mass index is 28.35 kg/m  as calculated from the following:    Height as of this encounter: 1.702 m (5' 7\").    Weight as of this encounter: 82.1 kg (181 lb).           reports that he has never smoked. He has never used smokeless tobacco.      Counseling Resources:    ATP IV Guidelines  Pooled Cohorts Equation Calculator  FRAX Risk Assessment  ICSI Preventive Guidelines  Dietary Guidelines for Americans, 2010  USDA's MyPlate  ASA Prophylaxis  Lung CA Screening           Messi Saldana MD, FAAFP     [facility]  [address] [place] [address]  [email or web address]   Office: [phone]  Fax: [phone]  Pager: [phone]     Identified Health Risks:  I have reviewed Opioid Use Disorder and Substance Use Disorder risk factors and made any needed referrals.   The patient was counseled and encouraged to consider modifying their diet and eating habits. He was provided with information on " recommended healthy diet options.  The patient was provided with written information regarding signs of hearing loss.

## 2023-10-31 NOTE — PATIENT INSTRUCTIONS
Patient Education   Personalized Prevention Plan  You are due for the preventive services outlined below.  Your care team is available to assist you in scheduling these services.  If you have already completed any of these items, please share that information with your care team to update in your medical record.  Health Maintenance Due   Topic Date Due     RSV VACCINE 60+ (1 - 1-dose 60+ series) Never done     Flu Vaccine (1) 09/01/2023     COVID-19 Vaccine (5 - 2023-24 season) 09/01/2023     Asthma Action Plan - yearly  09/26/2023     Basic Metabolic Panel  09/29/2023     Cholesterol Lab  09/29/2023     Kidney Microalbumin Urine Test  09/29/2023     Prostate Test  09/29/2023     Learning About Dietary Guidelines  What are the Dietary Guidelines for Americans?     Dietary Guidelines for Americans provide tips for eating well and staying healthy. This helps reduce the risk for long-term (chronic) diseases.  These guidelines recommend that you:  Eat and drink the right amount for you. The U.S. government's food guide is called MyPlate. It can help you make your own well-balanced eating plan.  Try to balance your eating with your activity. This helps you stay at a healthy weight.  Drink alcohol in moderation, if at all.  Limit foods high in salt, saturated fat, trans fat, and added sugar.  These guidelines are from the U.S. Department of Agriculture and the U.S. Department of Health and Human Services. They are updated every 5 years.  What is MyPlate?  MyPlate is the U.S. government's food guide. It can help you make your own well-balanced eating plan. A balanced eating plan means that you eat enough, but not too much, and that your food gives you the nutrients you need to stay healthy.  MyPlate focuses on eating plenty of whole grains, fruits, and vegetables, and on limiting fat and sugar. It is available online at www.ChooseMyPlate.gov.  How can you get started?  If you're trying to eat healthier, you can slowly  "change your eating habits over time. You don't have to make big changes all at once. Start by adding one or two healthy foods to your meals each day.  Grains  Choose whole-grain breads and cereals and whole-wheat pasta and whole-grain crackers.  Vegetables  Eat a variety of vegetables every day. They have lots of nutrients and are part of a heart-healthy diet.  Fruits  Eat a variety of fruits every day. Fruits contain lots of nutrients. Choose fresh fruit instead of fruit juice.  Protein foods  Choose fish and lean poultry more often. Eat red meat and fried meats less often. Dried beans, tofu, and nuts are also good sources of protein.  Dairy  Choose low-fat or fat-free products from this food group. If you have problems digesting milk, try eating cheese or yogurt instead.  Fats and oils  Limit fats and oils if you're trying to cut calories. Choose healthy fats when you cook. These include canola oil and olive oil.  Where can you learn more?  Go to https://www.[employer].net/patiented  Enter D676 in the search box to learn more about \"Learning About Dietary Guidelines.\"  Current as of: March 1, 2023               Content Version: 13.7    5645-4162 [employer].   Care instructions adapted under license by your healthcare professional. If you have questions about a medical condition or this instruction, always ask your healthcare professional. [employer] disclaims any warranty or liability for your use of this information.      Hearing Loss: Care Instructions  Overview     Hearing loss is a sudden or slow decrease in how well you hear. It can range from slight to profound. Permanent hearing loss can occur with aging. It also can happen when you are exposed long-term to loud noise. Examples include listening to loud music, riding motorcycles, or being around other loud machines.  Hearing loss can affect your work and home life. It can make you feel lonely or depressed. You may feel that you " have lost your independence. But hearing aids and other devices can help you hear better and feel connected to others.  Follow-up care is a key part of your treatment and safety. Be sure to make and go to all appointments, and call your doctor if you are having problems. It's also a good idea to know your test results and keep a list of the medicines you take.  How can you care for yourself at home?  Avoid loud noises whenever possible. This helps keep your hearing from getting worse.  Always wear hearing protection around loud noises.  Wear a hearing aid as directed.  A professional can help you pick a hearing aid that will work best for you.  You can also get hearing aids over the counter for mild to moderate hearing loss.  Have hearing tests as your doctor suggests. They can show whether your hearing has changed. Your hearing aid may need to be adjusted.  Use other devices as needed. These may include:  Telephone amplifiers and hearing aids that can connect to a television, stereo, radio, or microphone.  Devices that use lights or vibrations. These alert you to the doorbell, a ringing telephone, or a baby monitor.  Television closed-captioning. This shows the words at the bottom of the screen. Most new TVs can do this.  TTY (text telephone). This lets you type messages back and forth on the telephone instead of talking or listening. These devices are also called TDD. When messages are typed on the keyboard, they are sent over the phone line to a receiving TTY. The message is shown on a monitor.  Use text messaging, social media, and email if it is hard for you to communicate by telephone.  Try to learn a listening technique called speechreading. It is not lipreading. You pay attention to people's gestures, expressions, posture, and tone of voice. These clues can help you understand what a person is saying. Face the person you are talking to, and have them face you. Make sure the lighting is good. You need to see  "the other person's face clearly.  Think about counseling if you need help to adjust to your hearing loss.  When should you call for help?  Watch closely for changes in your health, and be sure to contact your doctor if:    You think your hearing is getting worse.     You have new symptoms, such as dizziness or nausea.   Where can you learn more?  Go to https://www.[employer].net/patiented  Enter R798 in the search box to learn more about \"Hearing Loss: Care Instructions.\"  Current as of: March 1, 2023               Content Version: 13.7    8020-0075 [employer].   Care instructions adapted under license by your healthcare professional. If you have questions about a medical condition or this instruction, always ask your healthcare professional. [employer] disclaims any warranty or liability for your use of this information.         "

## 2023-10-31 NOTE — LETTER
November 3, 2023      Rupert Alfred  [address]  [place] [address]        Dear ,    We are writing to inform you of your test results.    Labs are overall quite good     We advise:     Continue current cares.   Balanced low cholesterol diet.   Regular exercise.     For additional lab test information, [email or web address] is an excellent reference.     Resulted Orders   Comprehensive metabolic panel   Result Value Ref Range    Sodium 141 135 - 145 mmol/L      Comment:      Reference intervals for this test were updated on 09/26/2023 to more accurately reflect our healthy population. There may be differences in the flagging of prior results with similar values performed with this method. Interpretation of those prior results can be made in the context of the updated reference intervals.     Potassium 4.1 3.4 - 5.3 mmol/L    Carbon Dioxide (CO2) 28 22 - 29 mmol/L    Anion Gap 10 7 - 15 mmol/L    Urea Nitrogen 18.5 8.0 - 23.0 mg/dL    Creatinine 1.12 0.67 - 1.17 mg/dL    GFR Estimate 68 >60 mL/min/1.73m2    Calcium 9.4 8.8 - 10.2 mg/dL    Chloride 103 98 - 107 mmol/L    Glucose 96 70 - 99 mg/dL    Alkaline Phosphatase 88 40 - 129 U/L    AST 34 0 - 45 U/L      Comment:      Reference intervals for this test were updated on 6/12/2023 to more accurately reflect our healthy population. There may be differences in the flagging of prior results with similar values performed with this method. Interpretation of those prior results can be made in the context of the updated reference intervals.    ALT 38 0 - 70 U/L      Comment:      Reference intervals for this test were updated on 6/12/2023 to more accurately reflect our healthy population. There may be differences in the flagging of prior results with similar values performed with this method. Interpretation of those prior results can be made in the context of the updated reference intervals.      Protein Total 7.0 6.4 - 8.3 g/dL    Albumin 4.3 3.5 - 5.2 g/dL     Bilirubin Total 0.4 <=1.2 mg/dL   Lipid panel reflex to direct LDL Fasting   Result Value Ref Range    Cholesterol 173 <200 mg/dL    Triglycerides 138 <150 mg/dL    Direct Measure HDL 53 >=40 mg/dL    LDL Cholesterol Calculated 92 <=100 mg/dL    Non HDL Cholesterol 120 <130 mg/dL    Narrative    Cholesterol  Desirable:  <200 mg/dL    Triglycerides  Normal:  Less than 150 mg/dL  Borderline High:  150-199 mg/dL  High:  200-499 mg/dL  Very High:  Greater than or equal to 500 mg/dL    Direct Measure HDL  Female:  Greater than or equal to 50 mg/dL   Male:  Greater than or equal to 40 mg/dL    LDL Cholesterol  Desirable:  <100mg/dL  Above Desirable:  100-129 mg/dL   Borderline High:  130-159 mg/dL   High:  160-189 mg/dL   Very High:  >= 190 mg/dL    Non HDL Cholesterol  Desirable:  130 mg/dL  Above Desirable:  130-159 mg/dL  Borderline High:  160-189 mg/dL  High:  190-219 mg/dL  Very High:  Greater than or equal to 220 mg/dL   CBC with platelets   Result Value Ref Range    WBC Count 6.2 4.0 - 11.0 10e3/uL    RBC Count 4.28 (L) 4.40 - 5.90 10e6/uL    Hemoglobin 14.0 13.3 - 17.7 g/dL    Hematocrit 41.0 40.0 - 53.0 %    MCV 96 78 - 100 fL    MCH 32.7 26.5 - 33.0 pg    MCHC 34.1 31.5 - 36.5 g/dL    RDW 12.7 10.0 - 15.0 %    Platelet Count 266 150 - 450 10e3/uL   CK total   Result Value Ref Range     39 - 308 U/L   UA Macroscopic with reflex to Microscopic and Culture   Result Value Ref Range    Color Urine Yellow Colorless, Straw, Light Yellow, Yellow    Appearance Urine Clear Clear    Glucose Urine Negative Negative mg/dL    Bilirubin Urine Negative Negative    Ketones Urine Negative Negative mg/dL    Specific Gravity Urine 1.025 1.003 - 1.035    Blood Urine Negative Negative    pH Urine 6.0 5.0 - 7.0    Protein Albumin Urine Negative Negative mg/dL    Urobilinogen Urine 0.2 0.2, 1.0 E.U./dL    Nitrite Urine Negative Negative    Leukocyte Esterase Urine Negative Negative    Narrative    Microscopic not indicated    Albumin Random Urine Quantitative with Creat Ratio   Result Value Ref Range    Creatinine Urine mg/dL 96.8 mg/dL      Comment:      The reference ranges have not been established in urine creatinine. The results should be integrated into the clinical context for interpretation.    Albumin Urine mg/L <12.0 mg/L      Comment:      The reference ranges have not been established in urine albumin. The results should be integrated into the clinical context for interpretation.    Albumin Urine mg/g Cr        Comment:      Unable to calculate, urine albumin and/or urine creatinine is outside detectable limits.  Microalbuminuria is defined as an albumin:creatinine ratio of 17 to 299 for males and 25 to 299 for females. A ratio of albumin:creatinine of 300 or higher is indicative of overt proteinuria.  Due to biologic variability, positive results should be confirmed by a second, first-morning random or 24-hour timed urine specimen. If there is discrepancy, a third specimen is recommended. When 2 out of 3 results are in the microalbuminuria range, this is evidence for incipient nephropathy and warrants increased efforts at glucose control, blood pressure control, and institution of therapy with an angiotensin-converting-enzyme (ACE) inhibitor (if the patient can tolerate it).     TSH with free T4 reflex   Result Value Ref Range    TSH 2.46 0.30 - 4.20 uIU/mL   Prostate Specific Antigen Screen   Result Value Ref Range    Prostate Specific Antigen Screen 0.94 0.00 - 6.50 ng/mL    Narrative    This result is obtained using the Roche Elecsys total PSA method on the tiffanie e801 immunoassay analyzer. Results obtained with different assay methods or kits cannot be used interchangeably.   Hemoglobin A1c   Result Value Ref Range    Hemoglobin A1C 5.4 0.0 - 5.6 %      Comment:      Normal <5.7%   Prediabetes 5.7-6.4%    Diabetes 6.5% or higher     Note: Adopted from ADA consensus guidelines.       If you have any questions or  concerns, please call the clinic at the number listed above.       Sincerely,            Messi Saldana M.D.

## 2023-11-15 ENCOUNTER — OFFICE VISIT (OUTPATIENT)
Dept: FAMILY MEDICINE | Facility: CLINIC | Age: 78
End: 2023-11-15
Payer: COMMERCIAL

## 2023-11-15 VITALS
SYSTOLIC BLOOD PRESSURE: 128 MMHG | TEMPERATURE: 97.7 F | HEIGHT: 67 IN | HEART RATE: 72 BPM | WEIGHT: 173 LBS | BODY MASS INDEX: 27.15 KG/M2 | RESPIRATION RATE: 16 BRPM | DIASTOLIC BLOOD PRESSURE: 70 MMHG | OXYGEN SATURATION: 94 %

## 2023-11-15 DIAGNOSIS — M54.16 LUMBAR BACK PAIN WITH RADICULOPATHY AFFECTING LEFT LOWER EXTREMITY: ICD-10-CM

## 2023-11-15 DIAGNOSIS — M48.00 CENTRAL SPINAL STENOSIS: ICD-10-CM

## 2023-11-15 DIAGNOSIS — M51.24 HNP (HERNIATED NUCLEUS PULPOSUS), THORACIC: ICD-10-CM

## 2023-11-15 DIAGNOSIS — Z51.81 MEDICATION MONITORING ENCOUNTER: ICD-10-CM

## 2023-11-15 DIAGNOSIS — K22.70 BARRETT'S ESOPHAGUS WITHOUT DYSPLASIA: ICD-10-CM

## 2023-11-15 DIAGNOSIS — R73.03 PREDIABETES: ICD-10-CM

## 2023-11-15 DIAGNOSIS — M48.061 LUMBAR FORAMINAL STENOSIS: ICD-10-CM

## 2023-11-15 DIAGNOSIS — M54.50 LUMBAR PAIN: ICD-10-CM

## 2023-11-15 DIAGNOSIS — M54.17 LEFT LUMBOSACRAL RADICULOPATHY: ICD-10-CM

## 2023-11-15 DIAGNOSIS — Z01.818 PREOPERATIVE EXAMINATION: Primary | ICD-10-CM

## 2023-11-15 DIAGNOSIS — G47.33 OBSTRUCTIVE SLEEP APNEA SYNDROME: ICD-10-CM

## 2023-11-15 DIAGNOSIS — M48.061 SPINAL STENOSIS OF LUMBAR REGION AT MULTIPLE LEVELS: ICD-10-CM

## 2023-11-15 DIAGNOSIS — M47.816 LUMBAR FACET ARTHROPATHY: ICD-10-CM

## 2023-11-15 DIAGNOSIS — K20.0 EE (EOSINOPHILIC ESOPHAGITIS): ICD-10-CM

## 2023-11-15 DIAGNOSIS — M51.369 LUMBAR DEGENERATIVE DISC DISEASE: ICD-10-CM

## 2023-11-15 DIAGNOSIS — M43.10 ACQUIRED SPONDYLOLISTHESIS: ICD-10-CM

## 2023-11-15 DIAGNOSIS — I10 HYPERTENSION GOAL BP (BLOOD PRESSURE) < 140/90: ICD-10-CM

## 2023-11-15 DIAGNOSIS — F51.01 PRIMARY INSOMNIA: ICD-10-CM

## 2023-11-15 DIAGNOSIS — J45.20 MILD INTERMITTENT ASTHMA WITHOUT COMPLICATION: ICD-10-CM

## 2023-11-15 DIAGNOSIS — M48.062 SPINAL STENOSIS OF LUMBAR REGION WITH NEUROGENIC CLAUDICATION: ICD-10-CM

## 2023-11-15 DIAGNOSIS — E78.5 HYPERLIPIDEMIA LDL GOAL <100: ICD-10-CM

## 2023-11-15 PROCEDURE — 93000 ELECTROCARDIOGRAM COMPLETE: CPT | Performed by: FAMILY MEDICINE

## 2023-11-15 PROCEDURE — 99214 OFFICE O/P EST MOD 30 MIN: CPT | Mod: 25 | Performed by: FAMILY MEDICINE

## 2023-11-15 RX ORDER — KETOROLAC TROMETHAMINE 5 MG/ML
SOLUTION OPHTHALMIC
COMMUNITY
Start: 2023-10-25

## 2023-11-15 RX ORDER — PREDNISOLONE ACETATE 10 MG/ML
SUSPENSION/ DROPS OPHTHALMIC
COMMUNITY
Start: 2023-10-26

## 2023-11-15 RX ORDER — MOXIFLOXACIN 5 MG/ML
SOLUTION/ DROPS OPHTHALMIC
COMMUNITY
Start: 2023-10-25

## 2023-11-15 NOTE — PROGRESS NOTES
[address] [address]  Phone: [phone]  Primary Provider: Messi Saldana  Pre-op Performing Provider: MESSI SALDANA        PREOPERATIVE EVALUATION:  Today's date: 11/15/2023    Rupert is a 78 year old, presenting for the following:    Pre-Op Exam        11/15/2023     9:16 AM   Additional Questions   Roomed by Agnes TEJEDA       Surgical Information:  Surgery/Procedure: Cataract surgery   Surgery Location: [facility] in [place]   Surgeon: Dr. Canas   Surgery Date: 11/21/2023 (left), 11/29/2023 (right)  Time of Surgery: TBD  Where patient plans to recover: At home with family  Fax number for surgical facility: [phone]    Assessment & Plan     The proposed surgical procedure is considered LOW risk.    Preoperative examination    - EKG 12-lead complete w/read - Clinics    Prediabetes    - EKG 12-lead complete w/read - Clinics    Hypertension goal BP (blood pressure) < 140/90    - EKG 12-lead complete w/read - Clinics    Hyperlipidemia LDL goal <100    - EKG 12-lead complete w/read - Clinics    Mild intermittent asthma without complication      Obstructive sleep apnea syndrome      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      EE (eosinophilic esophagitis)      Spinal stenosis of lumbar region with neurogenic claudication      Lumbar foraminal stenosis      Central spinal stenosis      Spinal stenosis of lumbar region at multiple levels      Lumbar back pain with radiculopathy affecting left lower extremity      Left lumbosacral radiculopathy      Lumbar facet arthropathy      Acquired spondylolisthesis      Lumbar degenerative disc disease      HNP (herniated nucleus pulposus), thoracic      Lumbar pain      Primary insomnia      Medication monitoring encounter    - EKG 12-lead complete w/read - Clinics    Check with Ophthalmology - flomax    Using CPAP    Implanted Device: None     - No identified additional risk factors other than previously  addressed    Antiplatelet or Anticoagulation Medication Instructions:   - Patient is on no antiplatelet or anticoagulation medications.    Additional Medication Instructions:  No NSAID's, vitamins, supplements  Losartan / hydrochlorothiazide until day prior to surgery  Omeprazole ok day of surgery  Tamsulosin per ophthalmology    RECOMMENDATION:  APPROVAL GIVEN to proceed with proposed procedure, without further diagnostic evaluation.    Independent interpretation of a test performed by another physician/other qualified health care professional (not separately reported) - ECG    24 minutes spent by me on the date of the encounter doing chart review, review of outside records, review of test results, interpretation of tests, and patient visit       Subjective     HPI related to upcoming procedure:     Bilateral decreased vision - cataracts present - Dr Canas          1/25/2022     8:34 AM   Preop Questions   1. Have you ever had a heart attack or stroke? No   2. Have you ever had surgery on your heart or blood vessels, such as a stent placement, a coronary artery bypass, or surgery on an artery in your head, neck, heart, or legs? No   3. Do you have chest pain with activity? No   4. Do you have a history of  heart failure? No   5. Do you currently have a cold, bronchitis or symptoms of other infection? No   6. Do you have a cough, shortness of breath, or wheezing? No   7. Do you or anyone in your family have previous history of blood clots? No   8. Do you or does anyone in your family have a serious bleeding problem such as prolonged bleeding following surgeries or cuts? No   9. Have you ever had problems with anemia or been told to take iron pills? No   10. Have you had any abnormal blood loss such as black, tarry or bloody stools? No   11. Have you ever had a blood transfusion? No   12. Are you willing to have a blood transfusion if it is medically needed before, during, or after your surgery? Yes   13. Have you  or any of your relatives ever had problems with anesthesia? No   14. Do you have sleep apnea, excessive snoring or daytime drowsiness? YES - using   14a. Do you have a CPAP machine? Yes   15. Do you have any artifical heart valves or other implanted medical devices like a pacemaker, defibrillator, or continuous glucose monitor? No   16. Do you have artificial joints? No   17. Are you allergic to latex? No     Health Care Directive:  Patient does not have a Health Care Directive or Living Will: will check    Preoperative Review of :   reviewed - no record of controlled substances prescribed.      Prediabetes    Lab Results   Component Value Date    A1C 5.4 10/31/2023    A1C 5.3 09/29/2022    A1C 5.5 12/24/2021    A1C 5.2 08/16/2021    A1C 5.3 09/09/2020    A1C 5.3 06/15/2015     Htn    BP Readings from Last 3 Encounters:   11/15/23 128/70   10/31/23 132/78   09/07/23 (!) 155/72     Creatinine   Date Value Ref Range Status   10/31/2023 1.12 0.67 - 1.17 mg/dL Final   09/09/2020 1.15 0.66 - 1.25 mg/dL Final     GFR Estimate   Date Value Ref Range Status   10/31/2023 68 >60 mL/min/1.73m2 Final   09/09/2020 62 >60 mL/min/[1.73_m2] Final     Comment:     Non  GFR Calc  Starting 12/18/2018, serum creatinine based estimated GFR (eGFR) will be   calculated using the Chronic Kidney Disease Epidemiology Collaboration   (CKD-EPI) equation.       GERD - no issues    Low back - Dr Knight - recent MRI    AMBER - using CPAP every night    Review of Systems  Constitutional, neuro, ENT, endocrine, pulmonary, cardiac, gastrointestinal, genitourinary, musculoskeletal, integument and psychiatric systems are negative, except as otherwise noted.    Patient Active Problem List    Diagnosis Date Noted    EE (eosinophilic esophagitis)      Priority: High     Dr Ramirez      Hypertension goal BP (blood pressure) < 140/90      Priority: High    Hyperlipidemia LDL goal <100      Priority: High    Sleep apnea      Priority:  High     moderate      Asthma, mild intermittent      Priority: High     Park nicollet asthma      Verde's esophagus      Priority: Medium    Abnormal joint on examination 11/20/2019     Priority: Medium    Lumbar stenosis      Priority: Medium     Dr Knight - Calf weakness/atrophy      HNP (herniated nucleus pulposus), thoracic 10/25/2016     Priority: Medium    Advanced directives, counseling/discussion 08/22/2016     Priority: Medium     Advance Care Planning 8/22/2016: ACP Review of Chart / Resources Provided:  Reviewed chart for advance care plan.  Alexander Alfred has been provided information and resources to begin or update their advance care plan.  Added by Hali Pérez          Left ankle pain 04/13/2016     Priority: Medium    Ankle fracture 04/13/2016     Priority: Medium    Closed traumatic nondisplaced fracture of distal end of left fibula 02/26/2016     Priority: Medium    Acquired spondylolisthesis 10/08/2014     Priority: Medium    Spinal stenosis of lumbar region at multiple levels 10/07/2014     Priority: Medium    Spondylolisthesis of lumbar region 06/18/2014     Priority: Medium    Lumbar foraminal stenosis 06/18/2014     Priority: Medium    Central spinal stenosis 06/18/2014     Priority: Medium    Lumbar back pain with radiculopathy affecting left lower extremity 06/10/2014     Priority: Medium    Lumbar pain 04/02/2014     Priority: Medium    Lumbar facet arthropathy 03/29/2014     Priority: Medium    Lumbar degenerative disc disease 03/29/2014     Priority: Medium    Osteoarthritis of left hip 02/24/2014     Priority: Medium    Carpal tunnel syndrome 09/04/2013     Priority: Medium    Memory loss 09/04/2013     Priority: Medium    Nasal polyps      Priority: Medium     IMO update changed this record. Please review for accuracy      Ostium secundum type atrial septal defect 05/26/2004     Priority: Medium     Overview:   LW Onset:  12Fwv30  ; Patent Foramen Ovale      Allergic rhinitis  06/07/2003     Priority: Medium     Overview:   Rhinitis Allergic  NOS        Past Medical History:   Diagnosis Date    Asthma, mild intermittent     Park nic[place]let asthma    Verde's esophagus 06/2020    Dr Ramirez, EGD due 1 yr    EE (eosinophilic esophagitis) 08/2013    Dr Ramirez    Hyperlipidemia LDL goal <130     Hypertension goal BP (blood pressure) < 140/90     Lumbar degenerative disc disease     Lumbar stenosis     Dr Knight - Calf weakness/atrophy    Nasal polyps 2004    approx 2/year - increased sx from nasal polyps - prednisone 40 mg x 2 days, 20 mg x 2 days, 10mg x 10 days with excellent results    Osteoarthritis of left hip     Other chronic pain     Sensorineural hearing loss (SNHL) of both ears     Dr Hidalgo - R>L    Sleep apnea 2006    moderate - CPAP - 5-15 cm     Past Surgical History:   Procedure Laterality Date    BUNIONECTOMY LAPIDUS WITH TARSAL METATARSAL (TMT) FUSION Right 02/16/2022    Procedure: 1.  Cheilectomy, right foot. 2.  [place] first metatarsal bunionectomy, right foot. 3.  Debridement of joint, right first metatarsophalangeal joint.;  Surgeon: Chato Jeong DPM;  Location: [facility] OR    COLONOSCOPY  2010    normal - due 2020    COLONOSCOPY N/A 10/21/2020    normal, consider 10 yrs    DECOMPRESSION, FUSION LUMBAR POSTERIOR THREE + LEVELS, COMBINED N/A 10/07/2014    Dr Knight - COMBINED DECOMPRESSION, FUSION LUMBAR POSTERIOR THREE + LEVELS    ESOPHAGOSCOPY, GASTROSCOPY, DUODENOSCOPY (EGD), COMBINED  08/07/2013    Dr Ramirez - EE, non erosive gastropathy    ESOPHAGOSCOPY, GASTROSCOPY, DUODENOSCOPY (EGD), COMBINED N/A 06/02/2021    Verde's due 1 yr    ESOPHAGOSCOPY, GASTROSCOPY, DUODENOSCOPY (EGD), COMBINED N/A 07/27/2022    Procedure: ESOPHAGOGASTRODUODENOSCOPY (EGD) (fv) with biopsy by using cold forcep;  Surgeon: Teo Ramirez MD;  Location:  GI    ESOPHAGOSCOPY, GASTROSCOPY, DUODENOSCOPY (EGD), COMBINED N/A 09/07/2023    Procedure: Esophagoscopy, gastroscopy,  duodenoscopy (EGD), combined;  Surgeon: Teo Ramirez MD;  Location: [facility]    ROTATOR CUFF REPAIR RT/LT Left 2005    Lt shoulder - dr armaan lazar    ROTATOR CUFF REPAIR RT/LT Left 05/2009    Lt shoulder - dr armaan lazar    stress echo  07/2011    normal    STRESS ECHO (METRO)  06/2015    normal stress echo    SURGICAL HISTORY OF -   1982    Lt ankle CRIF    SURGICAL HISTORY OF -   04/2008    blephoplasty    SURGICAL HISTORY OF -   11/2016    T12-L1 - Decompression - Dr Knight -     SURGICAL HISTORY OF -  Right 2022    Rt CTS - [facility]    TONSILLECTOMY  1950     Current Outpatient Medications   Medication Sig Dispense Refill    aspirin 81 MG chewable tablet Take 1 tablet (81 mg) by mouth daily 108 tablet 3    Coenzyme Q10 (COQ-10) 100 MG CAPS Take 100 mg by mouth daily      fluticasone (FLONASE) 50 MCG/ACT nasal spray Spray 2 sprays into both nostrils daily 48 g 3    ibuprofen (ADVIL/MOTRIN) 600 MG tablet Take 600mg of ibuprofen every 6 hours x 7 days to assist in pain control.  If you are not tolerating the medication, you are ok to stop. 28 tablet 0    ketorolac (ACULAR) 0.5 % ophthalmic solution       KRILL OIL PO Take 1 capsule by mouth daily Taking MegaRed OTC. Strength unknown.      losartan-hydrochlorothiazide (HYZAAR) 50-12.5 MG tablet Take 1 tablet by mouth daily 90 tablet 3    moxifloxacin (VIGAMOX) 0.5 % ophthalmic solution       Multiple Vitamin (MULTIVITAMIN OR) Take 1 tablet by mouth daily       omeprazole (PRILOSEC) 20 MG DR capsule Take 1 capsule (20 mg) by mouth 2 times daily 180 capsule 3    prednisoLONE acetate (PRED FORTE) 1 % ophthalmic suspension       tamsulosin (FLOMAX) 0.4 MG capsule Take 1 capsule (0.4 mg) by mouth daily 90 capsule 3    vitamin D3 (CHOLECALCIFEROL) 50 mcg (2000 units) tablet Take 1 tablet (50 mcg) by mouth 2 times daily 180 tablet 0    albuterol (PROAIR HFA/PROVENTIL HFA/VENTOLIN HFA) 108 (90 Base) MCG/ACT inhaler INHALE 2 PUFFS INTO THE LUNGS EVERY 6 HOURS AS NEEDED  "FOR SHORTNESS OF BREATH OR DIFFICULT BREATHING OR WHEEZING (Patient not taking: Reported on 11/15/2023) 54 g 0       Allergies   Allergen Reactions    Codeine GI Disturbance    Hydrocodone GI Disturbance    Oxycodone GI Disturbance        Social History     Tobacco Use    Smoking status: Never    Smokeless tobacco: Never   Substance Use Topics    Alcohol use: Yes     Alcohol/week: 7.0 standard drinks of alcohol     Types: 7 Standard drinks or equivalent per week     Comment: 1 drinks per day avg     Family History   Problem Relation Age of Onset    Hypertension Father     C.A.D. Father         smoker    Other Cancer Father         life long smoker    C.A.D. Cousin         cousin - at 45    Hypertension Maternal Grandfather     C.A.D. Maternal Grandfather     Coronary Artery Disease Maternal Uncle     Coronary Artery Disease Maternal Uncle     Colon Cancer No family hx of      History   Drug Use No         Objective     /70   Pulse 72   Temp 97.7  F (36.5  C) (Tympanic)   Resp 16   Ht 1.702 m (5' 7\")   Wt 78.5 kg (173 lb)   SpO2 94%   BMI 27.10 kg/m      Physical Exam    GENERAL APPEARANCE: healthy, alert and no distress     EYES: EOMI,  PERRL     HENT: ear canals and TM's normal and nose and mouth without ulcers or lesions     NECK: no adenopathy, no asymmetry, masses, or scars and thyroid normal to palpation     RESP: lungs clear to auscultation - no rales, rhonchi or wheezes     CV: regular rates and rhythm, normal S1 S2, no S3 or S4 and no murmur, click or rub     ABDOMEN:  soft, nontender, no HSM or masses and bowel sounds normal     MS: extremities normal- no gross deformities noted, no evidence of inflammation in joints, FROM in all extremities.     SKIN: no suspicious lesions or rashes     NEURO: Normal strength and tone, sensory exam grossly normal, mentation intact and speech normal     PSYCH: mentation appears normal. and affect normal/bright     LYMPHATICS: No cervical adenopathy    Recent " Labs   Lab Test 10/31/23  0809 09/29/22  0854   HGB 14.0 14.0    257    142   POTASSIUM 4.1 4.0   CR 1.12 0.99   A1C 5.4 5.3        Diagnostics:  Labs reviewed and attached   EKG: appears normal, NSR, normal axis, normal intervals, no acute ST/T changes c/w ischemia, no LVH by voltage criteria, Right Bundle Branch Block    Revised Cardiac Risk Index (RCRI):  The patient has the following serious cardiovascular risks for perioperative complications:   - No serious cardiac risks = 0 points     RCRI Interpretation: 0 points: Class I (very low risk - 0.4% complication rate)    Signed Electronically by:            Messi Saldana MD, FAAFP     [facility]  [address] [place] [id number]  [email or web address]   Office: [phone]  Fax: [phone]     Copy of this evaluation report is provided to requesting physician.

## 2023-11-29 ENCOUNTER — TRANSFERRED RECORDS (OUTPATIENT)
Dept: HEALTH INFORMATION MANAGEMENT | Facility: CLINIC | Age: 78
End: 2023-11-29
Payer: COMMERCIAL

## 2023-12-11 ENCOUNTER — TRANSFERRED RECORDS (OUTPATIENT)
Dept: HEALTH INFORMATION MANAGEMENT | Facility: CLINIC | Age: 78
End: 2023-12-11
Payer: COMMERCIAL

## 2023-12-14 NOTE — PROGRESS NOTES
DISCHARGE  Reason for Discharge: Patient has failed to schedule further appointments.    Equipment Issued: none    Discharge Plan: Patient to continue home program.    Referring Provider:  Referred Self

## 2024-02-22 ENCOUNTER — TRANSFERRED RECORDS (OUTPATIENT)
Dept: HEALTH INFORMATION MANAGEMENT | Facility: CLINIC | Age: 79
End: 2024-02-22
Payer: COMMERCIAL

## 2024-04-18 ENCOUNTER — TRANSFERRED RECORDS (OUTPATIENT)
Dept: HEALTH INFORMATION MANAGEMENT | Facility: CLINIC | Age: 79
End: 2024-04-18
Payer: COMMERCIAL

## 2024-06-14 ENCOUNTER — TRANSFERRED RECORDS (OUTPATIENT)
Dept: HEALTH INFORMATION MANAGEMENT | Facility: CLINIC | Age: 79
End: 2024-06-14
Payer: COMMERCIAL

## 2024-09-23 NOTE — DISCHARGE INSTRUCTIONS
The patient has received a copy of the Provation report the doctor has written and discharge instructions have been discussed with the patient and responsible adult.  All questions were addressed and answered prior to patient discharge.     done

## 2024-09-26 ENCOUNTER — TRANSFERRED RECORDS (OUTPATIENT)
Dept: HEALTH INFORMATION MANAGEMENT | Facility: CLINIC | Age: 79
End: 2024-09-26
Payer: COMMERCIAL

## 2024-10-25 ENCOUNTER — TRANSFERRED RECORDS (OUTPATIENT)
Dept: HEALTH INFORMATION MANAGEMENT | Facility: CLINIC | Age: 79
End: 2024-10-25
Payer: COMMERCIAL

## 2024-10-27 DIAGNOSIS — R73.03 PREDIABETES: ICD-10-CM

## 2024-10-27 DIAGNOSIS — I10 HYPERTENSION GOAL BP (BLOOD PRESSURE) < 140/90: ICD-10-CM

## 2024-10-27 DIAGNOSIS — K22.70 BARRETT'S ESOPHAGUS WITHOUT DYSPLASIA: ICD-10-CM

## 2024-10-27 DIAGNOSIS — R39.12 BENIGN PROSTATIC HYPERPLASIA WITH WEAK URINARY STREAM: ICD-10-CM

## 2024-10-27 DIAGNOSIS — K20.0 EE (EOSINOPHILIC ESOPHAGITIS): ICD-10-CM

## 2024-10-27 DIAGNOSIS — Z00.00 ROUTINE GENERAL MEDICAL EXAMINATION AT A HEALTH CARE FACILITY: ICD-10-CM

## 2024-10-27 DIAGNOSIS — N40.1 BENIGN PROSTATIC HYPERPLASIA WITH WEAK URINARY STREAM: ICD-10-CM

## 2024-10-28 RX ORDER — TAMSULOSIN HYDROCHLORIDE 0.4 MG/1
0.4 CAPSULE ORAL DAILY
Qty: 90 CAPSULE | Refills: 0 | Status: SHIPPED | OUTPATIENT
Start: 2024-10-28 | End: 2024-11-06

## 2024-10-28 RX ORDER — LOSARTAN POTASSIUM AND HYDROCHLOROTHIAZIDE 12.5; 5 MG/1; MG/1
1 TABLET ORAL DAILY
Qty: 90 TABLET | Refills: 0 | Status: SHIPPED | OUTPATIENT
Start: 2024-10-28 | End: 2024-11-06

## 2024-11-06 ENCOUNTER — OFFICE VISIT (OUTPATIENT)
Dept: FAMILY MEDICINE | Facility: CLINIC | Age: 79
End: 2024-11-06
Payer: COMMERCIAL

## 2024-11-06 VITALS
OXYGEN SATURATION: 96 % | WEIGHT: 182.9 LBS | BODY MASS INDEX: 28.71 KG/M2 | TEMPERATURE: 98.2 F | RESPIRATION RATE: 18 BRPM | DIASTOLIC BLOOD PRESSURE: 64 MMHG | SYSTOLIC BLOOD PRESSURE: 138 MMHG | HEIGHT: 67 IN | HEART RATE: 59 BPM

## 2024-11-06 DIAGNOSIS — M48.061 SPINAL STENOSIS OF LUMBAR REGION AT MULTIPLE LEVELS: ICD-10-CM

## 2024-11-06 DIAGNOSIS — M43.16 SPONDYLOLISTHESIS OF LUMBAR REGION: ICD-10-CM

## 2024-11-06 DIAGNOSIS — I10 HYPERTENSION GOAL BP (BLOOD PRESSURE) < 140/90: ICD-10-CM

## 2024-11-06 DIAGNOSIS — Z51.81 MEDICATION MONITORING ENCOUNTER: ICD-10-CM

## 2024-11-06 DIAGNOSIS — Z12.11 SCREEN FOR COLON CANCER: ICD-10-CM

## 2024-11-06 DIAGNOSIS — M54.50 LUMBAR PAIN: ICD-10-CM

## 2024-11-06 DIAGNOSIS — M48.061 LUMBAR FORAMINAL STENOSIS: ICD-10-CM

## 2024-11-06 DIAGNOSIS — N40.1 BENIGN PROSTATIC HYPERPLASIA WITH WEAK URINARY STREAM: ICD-10-CM

## 2024-11-06 DIAGNOSIS — R39.12 BENIGN PROSTATIC HYPERPLASIA WITH WEAK URINARY STREAM: ICD-10-CM

## 2024-11-06 DIAGNOSIS — K20.0 EE (EOSINOPHILIC ESOPHAGITIS): ICD-10-CM

## 2024-11-06 DIAGNOSIS — G47.33 OBSTRUCTIVE SLEEP APNEA SYNDROME: ICD-10-CM

## 2024-11-06 DIAGNOSIS — Z12.5 SCREENING FOR PROSTATE CANCER: ICD-10-CM

## 2024-11-06 DIAGNOSIS — M47.816 LUMBAR FACET ARTHROPATHY: ICD-10-CM

## 2024-11-06 DIAGNOSIS — Z23 NEED FOR COVID-19 VACCINE: ICD-10-CM

## 2024-11-06 DIAGNOSIS — Z23 NEED FOR INFLUENZA VACCINATION: ICD-10-CM

## 2024-11-06 DIAGNOSIS — J45.20 MILD INTERMITTENT ASTHMA WITHOUT COMPLICATION: ICD-10-CM

## 2024-11-06 DIAGNOSIS — M54.16 LUMBAR BACK PAIN WITH RADICULOPATHY AFFECTING LEFT LOWER EXTREMITY: ICD-10-CM

## 2024-11-06 DIAGNOSIS — E78.5 HYPERLIPIDEMIA LDL GOAL <100: ICD-10-CM

## 2024-11-06 DIAGNOSIS — J33.9 NASAL POLYP: ICD-10-CM

## 2024-11-06 DIAGNOSIS — R73.03 PREDIABETES: ICD-10-CM

## 2024-11-06 DIAGNOSIS — Z00.00 ROUTINE GENERAL MEDICAL EXAMINATION AT A HEALTH CARE FACILITY: Primary | ICD-10-CM

## 2024-11-06 DIAGNOSIS — K22.70 BARRETT'S ESOPHAGUS WITHOUT DYSPLASIA: ICD-10-CM

## 2024-11-06 DIAGNOSIS — M48.062 SPINAL STENOSIS OF LUMBAR REGION WITH NEUROGENIC CLAUDICATION: ICD-10-CM

## 2024-11-06 DIAGNOSIS — M43.10 ACQUIRED SPONDYLOLISTHESIS: ICD-10-CM

## 2024-11-06 DIAGNOSIS — Z00.00 MEDICARE ANNUAL WELLNESS VISIT, SUBSEQUENT: ICD-10-CM

## 2024-11-06 DIAGNOSIS — M48.00 CENTRAL SPINAL STENOSIS: ICD-10-CM

## 2024-11-06 DIAGNOSIS — M51.362 DEGENERATION OF INTERVERTEBRAL DISC OF LUMBAR REGION WITH DISCOGENIC BACK PAIN AND LOWER EXTREMITY PAIN: ICD-10-CM

## 2024-11-06 PROCEDURE — 90662 IIV NO PRSV INCREASED AG IM: CPT | Performed by: FAMILY MEDICINE

## 2024-11-06 PROCEDURE — 90480 ADMN SARSCOV2 VAC 1/ONLY CMP: CPT | Performed by: FAMILY MEDICINE

## 2024-11-06 PROCEDURE — 90471 IMMUNIZATION ADMIN: CPT | Performed by: FAMILY MEDICINE

## 2024-11-06 PROCEDURE — G0439 PPPS, SUBSEQ VISIT: HCPCS | Mod: 25 | Performed by: FAMILY MEDICINE

## 2024-11-06 PROCEDURE — 99214 OFFICE O/P EST MOD 30 MIN: CPT | Mod: 25 | Performed by: FAMILY MEDICINE

## 2024-11-06 PROCEDURE — 91320 SARSCV2 VAC 30MCG TRS-SUC IM: CPT | Performed by: FAMILY MEDICINE

## 2024-11-06 RX ORDER — LOSARTAN POTASSIUM AND HYDROCHLOROTHIAZIDE 12.5; 5 MG/1; MG/1
1 TABLET ORAL DAILY
Qty: 90 TABLET | Refills: 3 | Status: SHIPPED | OUTPATIENT
Start: 2024-11-06

## 2024-11-06 RX ORDER — FLUTICASONE PROPIONATE 50 MCG
2 SPRAY, SUSPENSION (ML) NASAL DAILY
Qty: 48 G | Refills: 3 | Status: SHIPPED | OUTPATIENT
Start: 2024-11-06

## 2024-11-06 RX ORDER — TAMSULOSIN HYDROCHLORIDE 0.4 MG/1
0.4 CAPSULE ORAL DAILY
Qty: 90 CAPSULE | Refills: 3 | Status: SHIPPED | OUTPATIENT
Start: 2024-11-06

## 2024-11-06 SDOH — HEALTH STABILITY: PHYSICAL HEALTH: ON AVERAGE, HOW MANY DAYS PER WEEK DO YOU ENGAGE IN MODERATE TO STRENUOUS EXERCISE (LIKE A BRISK WALK)?: 2 DAYS

## 2024-11-06 ASSESSMENT — ASTHMA QUESTIONNAIRES
ACT_TOTALSCORE: 24
ACT_TOTALSCORE: 24
QUESTION_5 LAST FOUR WEEKS HOW WOULD YOU RATE YOUR ASTHMA CONTROL: COMPLETELY CONTROLLED
QUESTION_2 LAST FOUR WEEKS HOW OFTEN HAVE YOU HAD SHORTNESS OF BREATH: NOT AT ALL
ACT_TOTALSCORE: 24
QUESTION_1 LAST FOUR WEEKS HOW MUCH OF THE TIME DID YOUR ASTHMA KEEP YOU FROM GETTING AS MUCH DONE AT WORK, SCHOOL OR AT HOME: NONE OF THE TIME
QUESTION_2 LAST FOUR WEEKS HOW OFTEN HAVE YOU HAD SHORTNESS OF BREATH: ONCE OR TWICE A WEEK
QUESTION_1 LAST FOUR WEEKS HOW MUCH OF THE TIME DID YOUR ASTHMA KEEP YOU FROM GETTING AS MUCH DONE AT WORK, SCHOOL OR AT HOME: NONE OF THE TIME
QUESTION_4 LAST FOUR WEEKS HOW OFTEN HAVE YOU USED YOUR RESCUE INHALER OR NEBULIZER MEDICATION (SUCH AS ALBUTEROL): NOT AT ALL
QUESTION_4 LAST FOUR WEEKS HOW OFTEN HAVE YOU USED YOUR RESCUE INHALER OR NEBULIZER MEDICATION (SUCH AS ALBUTEROL): ONCE A WEEK OR LESS
QUESTION_3 LAST FOUR WEEKS HOW OFTEN DID YOUR ASTHMA SYMPTOMS (WHEEZING, COUGHING, SHORTNESS OF BREATH, CHEST TIGHTNESS OR PAIN) WAKE YOU UP AT NIGHT OR EARLIER THAN USUAL IN THE MORNING: NOT AT ALL
QUESTION_3 LAST FOUR WEEKS HOW OFTEN DID YOUR ASTHMA SYMPTOMS (WHEEZING, COUGHING, SHORTNESS OF BREATH, CHEST TIGHTNESS OR PAIN) WAKE YOU UP AT NIGHT OR EARLIER THAN USUAL IN THE MORNING: NOT AT ALL
QUESTION_5 LAST FOUR WEEKS HOW WOULD YOU RATE YOUR ASTHMA CONTROL: COMPLETELY CONTROLLED

## 2024-11-06 ASSESSMENT — SOCIAL DETERMINANTS OF HEALTH (SDOH): HOW OFTEN DO YOU GET TOGETHER WITH FRIENDS OR RELATIVES?: ONCE A WEEK

## 2024-11-06 NOTE — PROGRESS NOTES
Preventive Care Visit  [facility] [place]  Messi Saldana MD, Family Medicine  Nov 6, 2024      Assessment & Plan     Routine general medical examination at a health care facility    - Comprehensive metabolic panel  - Lipid panel reflex to direct LDL Fasting  - CBC with platelets  - CK total  - UA Macroscopic with reflex to Microscopic and Culture  - Albumin Random Urine Quantitative with Creat Ratio  - TSH with free T4 reflex  - Prostate Specific Antigen Screen  - Fecal colorectal cancer screen FIT  - Hemoglobin A1c  - REVIEW OF HEALTH MAINTENANCE PROTOCOL ORDERS  - PRIMARY CARE FOLLOW-UP SCHEDULING  - REVIEW OF HEALTH MAINTENANCE PROTOCOL ORDERS  - fluticasone (FLONASE) 50 MCG/ACT nasal spray  Dispense: 48 g; Refill: 3  - losartan-hydrochlorothiazide (HYZAAR) 50-12.5 MG tablet  Dispense: 90 tablet; Refill: 3  - omeprazole (PRILOSEC) 20 MG DR capsule  Dispense: 180 capsule; Refill: 3    Medicare annual wellness visit, subsequent    - Comprehensive metabolic panel  - Lipid panel reflex to direct LDL Fasting  - CBC with platelets  - CK total  - UA Macroscopic with reflex to Microscopic and Culture  - Albumin Random Urine Quantitative with Creat Ratio  - TSH with free T4 reflex  - Prostate Specific Antigen Screen  - Fecal colorectal cancer screen FIT  - Hemoglobin A1c  - REVIEW OF HEALTH MAINTENANCE PROTOCOL ORDERS  - PRIMARY CARE FOLLOW-UP SCHEDULING  - REVIEW OF HEALTH MAINTENANCE PROTOCOL ORDERS    Prediabetes    - Comprehensive metabolic panel  - Lipid panel reflex to direct LDL Fasting  - UA Macroscopic with reflex to Microscopic and Culture  - Albumin Random Urine Quantitative with Creat Ratio  - TSH with free T4 reflex  - Hemoglobin A1c  - REVIEW OF HEALTH MAINTENANCE PROTOCOL ORDERS  - PRIMARY CARE FOLLOW-UP SCHEDULING  - REVIEW OF HEALTH MAINTENANCE PROTOCOL ORDERS  - losartan-hydrochlorothiazide (HYZAAR) 50-12.5 MG tablet  Dispense: 90 tablet; Refill: 3    Hypertension goal BP (blood pressure)  < 140/90    - Comprehensive metabolic panel  - UA Macroscopic with reflex to Microscopic and Culture  - Albumin Random Urine Quantitative with Creat Ratio  - TSH with free T4 reflex  - REVIEW OF HEALTH MAINTENANCE PROTOCOL ORDERS  - PRIMARY CARE FOLLOW-UP SCHEDULING  - REVIEW OF HEALTH MAINTENANCE PROTOCOL ORDERS  - losartan-hydrochlorothiazide (HYZAAR) 50-12.5 MG tablet  Dispense: 90 tablet; Refill: 3    Hyperlipidemia LDL goal <100    - Comprehensive metabolic panel  - Lipid panel reflex to direct LDL Fasting  - CK total  - TSH with free T4 reflex  - REVIEW OF HEALTH MAINTENANCE PROTOCOL ORDERS  - PRIMARY CARE FOLLOW-UP SCHEDULING  - REVIEW OF HEALTH MAINTENANCE PROTOCOL ORDERS    Obstructive sleep apnea syndrome    - TSH with free T4 reflex  - REVIEW OF HEALTH MAINTENANCE PROTOCOL ORDERS  - PRIMARY CARE FOLLOW-UP SCHEDULING  - REVIEW OF HEALTH MAINTENANCE PROTOCOL ORDERS  - fluticasone (FLONASE) 50 MCG/ACT nasal spray  Dispense: 48 g; Refill: 3    Mild intermittent asthma without complication    - TSH with free T4 reflex  - REVIEW OF HEALTH MAINTENANCE PROTOCOL ORDERS  - PRIMARY CARE FOLLOW-UP SCHEDULING  - REVIEW OF HEALTH MAINTENANCE PROTOCOL ORDERS    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    - CBC with platelets  - REVIEW OF HEALTH MAINTENANCE PROTOCOL ORDERS  - PRIMARY CARE FOLLOW-UP SCHEDULING  - REVIEW OF HEALTH MAINTENANCE PROTOCOL ORDERS  - omeprazole (PRILOSEC) 20 MG DR capsule  Dispense: 180 capsule; Refill: 3    EE (eosinophilic esophagitis)    - CBC with platelets  - REVIEW OF HEALTH MAINTENANCE PROTOCOL ORDERS  - PRIMARY CARE FOLLOW-UP SCHEDULING  - REVIEW OF HEALTH MAINTENANCE PROTOCOL ORDERS  - omeprazole (PRILOSEC) 20 MG DR capsule  Dispense: 180 capsule; Refill: 3    Lumbar back pain with radiculopathy affecting left lower extremity    - REVIEW OF HEALTH MAINTENANCE PROTOCOL ORDERS  - PRIMARY CARE FOLLOW-UP SCHEDULING  - REVIEW OF HEALTH MAINTENANCE PROTOCOL ORDERS    Lumbar pain    - REVIEW OF  HEALTH MAINTENANCE PROTOCOL ORDERS  - PRIMARY CARE FOLLOW-UP SCHEDULING  - REVIEW OF HEALTH MAINTENANCE PROTOCOL ORDERS    Degeneration of intervertebral disc of lumbar region with discogenic back pain and lower extremity pain    - REVIEW OF HEALTH MAINTENANCE PROTOCOL ORDERS  - PRIMARY CARE FOLLOW-UP SCHEDULING  - REVIEW OF HEALTH MAINTENANCE PROTOCOL ORDERS    Acquired spondylolisthesis    - REVIEW OF HEALTH MAINTENANCE PROTOCOL ORDERS  - PRIMARY CARE FOLLOW-UP SCHEDULING  - REVIEW OF HEALTH MAINTENANCE PROTOCOL ORDERS    Central spinal stenosis    - REVIEW OF HEALTH MAINTENANCE PROTOCOL ORDERS  - PRIMARY CARE FOLLOW-UP SCHEDULING  - REVIEW OF HEALTH MAINTENANCE PROTOCOL ORDERS    Lumbar facet arthropathy    - REVIEW OF HEALTH MAINTENANCE PROTOCOL ORDERS  - PRIMARY CARE FOLLOW-UP SCHEDULING  - REVIEW OF HEALTH MAINTENANCE PROTOCOL ORDERS    Lumbar foraminal stenosis    - REVIEW OF HEALTH MAINTENANCE PROTOCOL ORDERS  - PRIMARY CARE FOLLOW-UP SCHEDULING  - REVIEW OF HEALTH MAINTENANCE PROTOCOL ORDERS    Spinal stenosis of lumbar region with neurogenic claudication    - REVIEW OF HEALTH MAINTENANCE PROTOCOL ORDERS  - PRIMARY CARE FOLLOW-UP SCHEDULING  - REVIEW OF HEALTH MAINTENANCE PROTOCOL ORDERS    Spinal stenosis of lumbar region at multiple levels    - REVIEW OF HEALTH MAINTENANCE PROTOCOL ORDERS  - PRIMARY CARE FOLLOW-UP SCHEDULING  - REVIEW OF HEALTH MAINTENANCE PROTOCOL ORDERS    Spondylolisthesis of lumbar region    - REVIEW OF HEALTH MAINTENANCE PROTOCOL ORDERS  - PRIMARY CARE FOLLOW-UP SCHEDULING  - REVIEW OF HEALTH MAINTENANCE PROTOCOL ORDERS    Benign prostatic hyperplasia with weak urinary stream    - Prostate Specific Antigen Screen  - REVIEW OF HEALTH MAINTENANCE PROTOCOL ORDERS  - PRIMARY CARE FOLLOW-UP SCHEDULING  - REVIEW OF HEALTH MAINTENANCE PROTOCOL ORDERS  - tamsulosin (FLOMAX) 0.4 MG capsule  Dispense: 90 capsule; Refill: 3    Nasal polyps    - fluticasone (FLONASE) 50 MCG/ACT nasal spray   "Dispense: 48 g; Refill: 3    Screening for prostate cancer    - Prostate Specific Antigen Screen  - REVIEW OF HEALTH MAINTENANCE PROTOCOL ORDERS  - PRIMARY CARE FOLLOW-UP SCHEDULING  - REVIEW OF HEALTH MAINTENANCE PROTOCOL ORDERS    Screen for colon cancer    - Fecal colorectal cancer screen FIT  - REVIEW OF HEALTH MAINTENANCE PROTOCOL ORDERS  - PRIMARY CARE FOLLOW-UP SCHEDULING  - REVIEW OF HEALTH MAINTENANCE PROTOCOL ORDERS    Medication monitoring encounter    - Comprehensive metabolic panel  - Lipid panel reflex to direct LDL Fasting  - CBC with platelets  - CK total  - UA Macroscopic with reflex to Microscopic and Culture  - Albumin Random Urine Quantitative with Creat Ratio  - TSH with free T4 reflex  - Prostate Specific Antigen Screen  - Fecal colorectal cancer screen FIT  - Hemoglobin A1c  - REVIEW OF HEALTH MAINTENANCE PROTOCOL ORDERS  - PRIMARY CARE FOLLOW-UP SCHEDULING  - REVIEW OF HEALTH MAINTENANCE PROTOCOL ORDERS    Need for influenza vaccination    - INFLUENZA HIGH DOSE, TRIVALENT, PF (FLUZONE)    Need for COVID-19 vaccine    - COVID-19 12+ (PFIZER)    Patient has been advised of split billing requirements and indicates understanding: Yes    BMI  Estimated body mass index is 28.86 kg/m  as calculated from the following:    Height as of this encounter: 1.695 m (5' 6.75\").    Weight as of this encounter: 83 kg (182 lb 14.4 oz).       Counseling  Appropriate preventive services were addressed with this patient via screening, questionnaire, or discussion as appropriate for fall prevention, nutrition, physical activity, Tobacco-use cessation, social engagement, weight loss and cognition.  Checklist reviewing preventive services available has been given to the patient.  Reviewed patient's diet, addressing concerns and/or questions.   He is at risk for lack of exercise and has been provided with information to increase physical activity for the benefit of his well-being.   Discussed possible causes of " fatigue. The patient was provided with written information regarding signs of hearing loss.     Regular exercise    Plan:    1) Medications: reviewed / refilled    2) Labs: pending    3) Immunizations: reviewed    4) Imaging/Diagnostics: NA    5) Consults: Spine prn    Return in about 1 year (around 11/6/2025) for Complete Physical, Medication Recheck Visit, Follow Up Chronic.    42 minutes spent by myself on the date of the encounter doing chart review, history and exam, documentation and further activities per the note.    The longitudinal plan of care for the diagnosis(es)/condition(s) as documented were addressed during this visit. Due to the added complexity in care, I will continue to support Pat in the subsequent management and with ongoing continuity of care.    Aurora Emery is a 79 year old, presenting for the following:  Medicare Visit        11/6/2024     2:33 PM   Additional Questions   Roomed by Mely WHITEHEAD         Patient is not fasting.    Prediabetes    Lab Results   Component Value Date    A1C 5.4 10/31/2023    A1C 5.3 09/29/2022    A1C 5.5 12/24/2021    A1C 5.2 08/16/2021    A1C 5.3 09/09/2020    A1C 5.3 06/15/2015     Hypertension Follow-up    Do you check your blood pressure regularly outside of the clinic? No   Are you following a low salt diet? Yes  Are your blood pressures ever more than 140 on the top number (systolic) OR more   than 90 on the bottom number (diastolic), for example 140/90? No    BP Readings from Last 3 Encounters:   11/06/24 138/64   11/15/23 128/70   10/31/23 132/78       Creatinine   Date Value Ref Range Status   10/31/2023 1.12 0.67 - 1.17 mg/dL Final   09/09/2020 1.15 0.66 - 1.25 mg/dL Final       GFR Estimate   Date Value Ref Range Status   10/31/2023 68 >60 mL/min/1.73m2 Final   09/09/2020 62 >60 mL/min/[1.73_m2] Final     Comment:     Non  GFR Calc  Starting 12/18/2018, serum creatinine based estimated GFR (eGFR) will be   calculated using the Chronic  Kidney Disease Epidemiology Collaboration   (CKD-EPI) equation.       AMBER - using cpap every night    Asthma        10/31/2023     9:09 AM 11/6/2024     2:30 PM 11/6/2024     2:38 PM   ACT Total Scores   ACT TOTAL SCORE (Goal Greater than or Equal to 20) 25 24  24   In the past 12 months, how many times did you visit the emergency room for your asthma without being admitted to the hospital? 0 0  0   In the past 12 months, how many times were you hospitalized overnight because of your asthma? 0 0  0       Patient-reported     GERD / EoE / Verde's - controlled with omeprazole - last EGD 9/2023, next 3 yrs    LBP - residual Left calf weakness and dystrophy - Dr Knight - no further intervention - has seen [facility] /  neurology    BPH - no issues    Health Care Directive  Patient does not have a Health Care Directive: Discussed advance care planning with patient; information given to patient to review.      11/6/2024   General Health   How would you rate your overall physical health? Excellent   Feel stress (tense, anxious, or unable to sleep) Not at all            11/6/2024   Nutrition   Diet: Regular (no restrictions)            11/6/2024   Exercise   Days per week of moderate/strenous exercise 2 days      (!) EXERCISE CONCERN      11/6/2024   Social Factors   Frequency of gathering with friends or relatives Once a week   Worry food won't last until get money to buy more No   Food not last or not have enough money for food? No   Do you have housing? (Housing is defined as stable permanent housing and does not include staying ouside in a car, in a tent, in an abandoned building, in an overnight shelter, or couch-surfing.) Yes   Are you worried about losing your housing? No   Lack of transportation? No   Unable to get utilities (heat,electricity)? No            11/6/2024   Fall Risk   Fallen 2 or more times in the past year? No     No    Trouble with walking or balance? Yes     No         Patient-reported    Multiple values from one day are sorted in reverse-chronological order           11/6/2024   Activities of Daily Living- Home Safety   Needs help with the following daily activites None of the above   Safety concerns in the home None of the above            11/6/2024   Dental   Dentist two times every year? Yes            11/6/2024   Hearing Screening   Hearing concerns? (!) I NEED TO ASK PEOPLE TO SPEAK UP OR REPEAT THEMSELVES.    (!) TROUBLE UNDERSTANDING SOFT OR WHISPERED SPEECH.       Multiple values from one day are sorted in reverse-chronological order         11/6/2024   Driving Risk Screening   Patient/family members have concerns about driving No            11/6/2024   General Alertness/Fatigue Screening   Have you been more tired than usual lately? (!) YES            11/6/2024   Urinary Incontinence Screening   Bothered by leaking urine in past 6 months No            11/6/2024   TB Screening   Were you born outside of the [place]? No            Today's PHQ-2 Score:       11/6/2024     2:29 PM   PHQ-2 ( 1999 [employer])   Q1: Little interest or pleasure in doing things 0    Q2: Feeling down, depressed or hopeless 0    PHQ-2 Score 0    Q1: Little interest or pleasure in doing things Not at all   Q2: Feeling down, depressed or hopeless Not at all   PHQ-2 Score 0       Patient-reported           11/6/2024   Substance Use   Alcohol more than 3/day or more than 7/wk No   Do you have a current opioid prescription? No   How severe/bad is pain from 1 to 10? 0/10 (No Pain)   Do you use any other substances recreationally? (!) CANNABIS PRODUCTS for sleep        Social History     Tobacco Use    Smoking status: Never    Smokeless tobacco: Never   Vaping Use    Vaping status: Never Used   Substance Use Topics    Alcohol use: Yes     Alcohol/week: 7.0 standard drinks of alcohol     Types: 7 Standard drinks or equivalent per week     Comment: 1 drinks per day avg    Drug use: No         ASCVD Risk    The 10-year ASCVD risk score (Isabella INIGUEZ, et al., 2019) is: 35.8%    Values used to calculate the score:      Age: 79 years      Sex: Male      Is Non- : No      Diabetic: No      Tobacco smoker: No      Systolic Blood Pressure: 138 mmHg      Is BP treated: Yes      HDL Cholesterol: 53 mg/dL      Total Cholesterol: 173 mg/dL    Fracture Risk Assessment Tool  Link to Frax Calculator  Use the information below to complete the Frax calculator  : 1945  Sex: male  Weight (kg): 83 kg (actual weight)  Height (cm): 169.5 cm  Previous Fragility Fracture:  Yes  History of parent with fractured hip:  No  Current Smoking:  No  Patient has been on glucocorticoids for more than 3 months (5mg/day or more): No  Rheumatoid Arthritis on Problem List:  No  Secondary Osteoporosis on Problem List:  No  Consumes 3 or more units of alcohol per day: No  Femoral Neck BMD (g/cm2)            Reviewed and updated as needed this visit by Provider                  Current providers sharing in care for this patient include:  Patient Care Team:  Messi Saldana MD as PCP - General  Messi Saldana MD as Assigned PCP    The following health maintenance items are reviewed in Epic and correct as of today:  Health Maintenance   Topic Date Due    RSV VACCINE (1 - 1-dose 75+ series) Never done    ASTHMA ACTION PLAN  2023    BMP  10/31/2024    LIPID  10/31/2024    MICROALBUMIN  10/31/2024    PSA  10/31/2024    ASTHMA CONTROL TEST  2025    MEDICARE ANNUAL WELLNESS VISIT  2025    ANNUAL REVIEW OF HM ORDERS  2025    FALL RISK ASSESSMENT  2025    GLUCOSE  10/31/2026    ADVANCE CARE PLANNING  2029    COLORECTAL CANCER SCREENING  10/21/2030    DTAP/TDAP/TD IMMUNIZATION (5 - Td or Tdap) 2033    HEPATITIS C SCREENING  Completed    PHQ-2 (once per calendar year)  Completed    INFLUENZA VACCINE  Completed    Pneumococcal Vaccine: 65+ Years  Completed    ZOSTER IMMUNIZATION  Completed     COVID-19 Vaccine  Completed    HPV IMMUNIZATION  Aged Out    MENINGITIS IMMUNIZATION  Aged Out    RSV MONOCLONAL ANTIBODY  Aged Out     Patient Active Problem List   Diagnosis    Nasal polyps    Asthma, mild intermittent    Sleep apnea    Hyperlipidemia LDL goal <100    Hypertension goal BP (blood pressure) < 140/90    EE (eosinophilic esophagitis)    Osteoarthritis of left hip    Lumbar facet arthropathy    Lumbar degenerative disc disease    Lumbar pain    Lumbar back pain with radiculopathy affecting left lower extremity    Spondylolisthesis of lumbar region    Lumbar foraminal stenosis    Central spinal stenosis    Spinal stenosis of lumbar region at multiple levels    Acquired spondylolisthesis    Closed traumatic nondisplaced fracture of distal end of left fibula    Left ankle pain    Ankle fracture    Lumbar stenosis    Abnormal joint on examination    Allergic rhinitis    Carpal tunnel syndrome    HNP (herniated nucleus pulposus), thoracic    Memory loss    Ostium secundum type atrial septal defect    Verde's esophagus       Past Medical History:   Diagnosis Date    Asthma, mild intermittent     [place] nic[place]let asthma    Verde's esophagus 06/2020    Dr Ramirez, EGD due 1 yr    EE (eosinophilic esophagitis) 08/2013    Dr Ramirez    Hyperlipidemia LDL goal <130     Hypertension goal BP (blood pressure) < 140/90     Lumbar degenerative disc disease     Lumbar stenosis     Dr Knight - Calf weakness/atrophy    Nasal polyps 2004    approx 2/year - increased sx from nasal polyps - prednisone 40 mg x 2 days, 20 mg x 2 days, 10mg x 10 days with excellent results    Osteoarthritis of left hip     Other chronic pain     Sensorineural hearing loss (SNHL) of both ears     Dr Hidalgo - R>L    Sleep apnea 2006    moderate - CPAP - 5-15 cm       Past Surgical History:   Procedure Laterality Date    BUNIONECTOMY LAPIDUS WITH TARSAL METATARSAL (TMT) FUSION Right 02/16/2022    Procedure: 1.  Cheilectomy, right foot. 2.   [place] first metatarsal bunionectomy, right foot. 3.  Debridement of joint, right first metatarsophalangeal joint.;  Surgeon: Chato Jeong DPM;  Location: [facility] OR    COLONOSCOPY  2010    normal - due 2020    COLONOSCOPY N/A 10/21/2020    normal, consider 10 yrs    DECOMPRESSION, FUSION LUMBAR POSTERIOR THREE + LEVELS, COMBINED N/A 10/07/2014    Dr Knight - COMBINED DECOMPRESSION, FUSION LUMBAR POSTERIOR THREE + LEVELS    ESOPHAGOSCOPY, GASTROSCOPY, DUODENOSCOPY (EGD), COMBINED  08/07/2013    Dr Ramirez - EE, non erosive gastropathy    ESOPHAGOSCOPY, GASTROSCOPY, DUODENOSCOPY (EGD), COMBINED N/A 06/02/2021    Verde's due 1 yr    ESOPHAGOSCOPY, GASTROSCOPY, DUODENOSCOPY (EGD), COMBINED N/A 07/27/2022    Procedure: ESOPHAGOGASTRODUODENOSCOPY (EGD) (fv) with biopsy by using cold forcep;  Surgeon: Teo Ramirez MD;  Location: [facility] GI    ESOPHAGOSCOPY, GASTROSCOPY, DUODENOSCOPY (EGD), COMBINED N/A 09/07/2023    Procedure: Esophagoscopy, gastroscopy, duodenoscopy (EGD), combined;  Surgeon: Teo Ramirez MD;  Location: [facility] GI    ROTATOR CUFF REPAIR RT/LT Left 2005    Lt shoulder - dr armaan lazar    ROTATOR CUFF REPAIR RT/LT Left 05/2009    Lt shoulder - dr armaan lazar    stress echo  07/2011    normal    STRESS ECHO (METRO)  06/2015    normal stress echo    SURGICAL HISTORY OF -   1982    Lt ankle CRIF    SURGICAL HISTORY OF -   04/2008    blephoplasty    SURGICAL HISTORY OF -   11/2016    T12-L1 - Decompression - Dr Knight -     SURGICAL HISTORY OF -  Right 2022    Rt CTS - [facility]    TONSILLECTOMY  1950       Current Outpatient Medications   Medication Sig Dispense Refill    aspirin 81 MG chewable tablet Take 1 tablet (81 mg) by mouth daily 108 tablet 3    Coenzyme Q10 (COQ-10) 100 MG CAPS Take 100 mg by mouth daily      fluticasone (FLONASE) 50 MCG/ACT nasal spray Spray 2 sprays into both nostrils daily. 48 g 3    ibuprofen (ADVIL/MOTRIN) 600 MG tablet Take 600mg of ibuprofen every 6 hours x 7  days to assist in pain control.  If you are not tolerating the medication, you are ok to stop. 28 tablet 0    ketorolac (ACULAR) 0.5 % ophthalmic solution       KRILL OIL PO Take 1 capsule by mouth daily Taking MegaRed OTC. Strength unknown.      losartan-hydrochlorothiazide (HYZAAR) 50-12.5 MG tablet Take 1 tablet by mouth daily. 90 tablet 3    moxifloxacin (VIGAMOX) 0.5 % ophthalmic solution       Multiple Vitamin (MULTIVITAMIN OR) Take 1 tablet by mouth daily       omeprazole (PRILOSEC) 20 MG DR capsule Take 1 capsule (20 mg) by mouth 2 times daily. 180 capsule 3    prednisoLONE acetate (PRED FORTE) 1 % ophthalmic suspension       tamsulosin (FLOMAX) 0.4 MG capsule Take 1 capsule (0.4 mg) by mouth daily. 90 capsule 3    vitamin D3 (CHOLECALCIFEROL) 50 mcg (2000 units) tablet Take 1 tablet (50 mcg) by mouth 2 times daily 180 tablet 0    albuterol (PROAIR HFA/PROVENTIL HFA/VENTOLIN HFA) 108 (90 Base) MCG/ACT inhaler INHALE 2 PUFFS INTO THE LUNGS EVERY 6 HOURS AS NEEDED FOR SHORTNESS OF BREATH OR DIFFICULT BREATHING OR WHEEZING (Patient not taking: Reported on 11/15/2023) 54 g 0       Allergies   Allergen Reactions    Codeine GI Disturbance    Hydrocodone GI Disturbance    Oxycodone GI Disturbance       Family History   Problem Relation Age of Onset    Hypertension Father     C.A.D. Father         smoker    Other Cancer Father         life long smoker    C.A.D. Cousin         cousin - at 45    Hypertension Maternal Grandfather     C.A.D. Maternal Grandfather     Coronary Artery Disease Maternal Uncle     Coronary Artery Disease Maternal Uncle     Colon Cancer No family hx of        Social History     Socioeconomic History    Marital status:      Spouse name: Valeria    Number of children: 3    Years of education: 16    Highest education level: None   Occupational History     Employer: [employer]   Tobacco Use    Smoking status: Never    Smokeless tobacco: Never   Vaping Use    Vaping status: Never Used   Substance  and Sexual Activity    Alcohol use: Yes     Alcohol/week: 7.0 standard drinks of alcohol     Types: 7 Standard drinks or equivalent per week     Comment: 1 drinks per day avg    Drug use: No    Sexual activity: Yes     Partners: Female     Birth control/protection: Male Surgical   Other Topics Concern    Parent/sibling w/ CABG, MI or angioplasty before 65F 55M? No    Caffeine Concern Yes     Comment: 2-3 cups daily    Exercise Yes     Comment: 3+/wk, landscaping, Mtn Bike riding    Seat Belt Yes     Social Drivers of Health     Financial Resource Strain: Low Risk  (11/6/2024)    Financial Resource Strain     Within the past 12 months, have you or your family members you live with been unable to get utilities (heat, electricity) when it was really needed?: No   Food Insecurity: Low Risk  (11/6/2024)    Food Insecurity     Within the past 12 months, did you worry that your food would run out before you got money to buy more?: No     Within the past 12 months, did the food you bought just not last and you didn t have money to get more?: No   Transportation Needs: Low Risk  (11/6/2024)    Transportation Needs     Within the past 12 months, has lack of transportation kept you from medical appointments, getting your medicines, non-medical meetings or appointments, work, or from getting things that you need?: No   Physical Activity: Unknown (11/6/2024)    Exercise Vital Sign     Days of Exercise per Week: 2 days   Stress: No Stress Concern Present (11/6/2024)    [employer] - Occupational Stress Questionnaire     Feeling of Stress : Not at all   Social Connections: Unknown (11/6/2024)    Social Connection and Isolation Panel [NHANES]     Frequency of Social Gatherings with Friends and Family: Once a week   Interpersonal Safety: Low Risk  (11/6/2024)    Interpersonal Safety     Do you feel physically and emotionally safe where you currently live?: Yes     Within the past 12 months, have you been  "hit, slapped, kicked or otherwise physically hurt by someone?: No     Within the past 12 months, have you been humiliated or emotionally abused in other ways by your partner or ex-partner?: No   Housing Stability: Low Risk  (11/6/2024)    Housing Stability     Do you have housing? : Yes     Are you worried about losing your housing?: No     Colonoscopy:  consider 10/2030  FIT / Cologuard: consider  PSA: pending    Review of Systems  CONSTITUTIONAL: NEGATIVE for fever, chills, change in weightconsider  INTEGUMENTARY/SKIN: NEGATIVE for worrisome rashes, moles or lesions  EYES: NEGATIVE for vision changes or irritation  ENT/MOUTH: NEGATIVE for ear, mouth and throat problems  RESP: NEGATIVE for significant cough or SOB  CV: NEGATIVE for chest pain, palpitations or peripheral edema  GI: NEGATIVE for nausea, abdominal pain, heartburn, or change in bowel habits  : NEGATIVE for frequency, dysuria, or hematuria  MUSCULOSKELETAL: NEGATIVE for significant arthralgias or myalgia  NEURO: NEGATIVE for weakness, dizziness or paresthesias  ENDOCRINE: NEGATIVE for temperature intolerance, skin/hair changes  HEME: NEGATIVE for bleeding problems  PSYCHIATRIC: NEGATIVE for changes in mood or affect     Objective    Exam  /64   Pulse 59   Temp 98.2  F (36.8  C) (Tympanic)   Resp 18   Ht 1.695 m (5' 6.75\")   Wt 83 kg (182 lb 14.4 oz)   SpO2 96%   BMI 28.86 kg/m     Estimated body mass index is 28.86 kg/m  as calculated from the following:    Height as of this encounter: 1.695 m (5' 6.75\").    Weight as of this encounter: 83 kg (182 lb 14.4 oz).    Physical Exam  GENERAL: alert and no distress  EYES: Eyes grossly normal to inspection, PERRL and conjunctivae and sclerae normal  HENT: ear canals and TM's normal, nose and mouth without ulcers or lesions  NECK: no adenopathy, no asymmetry, masses, or scars  RESP: lungs clear to auscultation - no rales, rhonchi or wheezes  CV: regular rate and rhythm, normal S1 S2, no S3 or S4, " no murmur, click or rub, no peripheral edema  ABDOMEN: soft, nontender, no hepatosplenomegaly, no masses and bowel sounds normal  MS: no gross musculoskeletal defects noted, no edema  SKIN: no suspicious lesions or rashes  NEURO: Normal strength and tone, mentation intact and speech normal  PSYCH: mentation appears normal, affect normal/bright   / Rectal: Pt declines        11/6/2024   Mini Cog   Clock Draw Score 2 Normal   3 Item Recall 3 objects recalled   Mini Cog Total Score 5                 Signed Electronically by: Messi Saldana MD

## 2024-11-11 ENCOUNTER — LAB (OUTPATIENT)
Dept: LAB | Facility: CLINIC | Age: 79
End: 2024-11-11
Payer: COMMERCIAL

## 2024-11-11 DIAGNOSIS — Z12.11 SCREEN FOR COLON CANCER: ICD-10-CM

## 2024-11-11 DIAGNOSIS — Z51.81 MEDICATION MONITORING ENCOUNTER: ICD-10-CM

## 2024-11-11 DIAGNOSIS — Z00.00 MEDICARE ANNUAL WELLNESS VISIT, SUBSEQUENT: ICD-10-CM

## 2024-11-11 DIAGNOSIS — G47.33 OBSTRUCTIVE SLEEP APNEA SYNDROME: ICD-10-CM

## 2024-11-11 DIAGNOSIS — I10 HYPERTENSION GOAL BP (BLOOD PRESSURE) < 140/90: ICD-10-CM

## 2024-11-11 DIAGNOSIS — E78.5 HYPERLIPIDEMIA LDL GOAL <100: ICD-10-CM

## 2024-11-11 DIAGNOSIS — Z00.00 ROUTINE GENERAL MEDICAL EXAMINATION AT A HEALTH CARE FACILITY: ICD-10-CM

## 2024-11-11 DIAGNOSIS — K22.70 BARRETT'S ESOPHAGUS WITHOUT DYSPLASIA: ICD-10-CM

## 2024-11-11 DIAGNOSIS — Z12.5 SCREENING FOR PROSTATE CANCER: ICD-10-CM

## 2024-11-11 DIAGNOSIS — R39.12 BENIGN PROSTATIC HYPERPLASIA WITH WEAK URINARY STREAM: ICD-10-CM

## 2024-11-11 DIAGNOSIS — J45.20 MILD INTERMITTENT ASTHMA WITHOUT COMPLICATION: ICD-10-CM

## 2024-11-11 DIAGNOSIS — K20.0 EE (EOSINOPHILIC ESOPHAGITIS): ICD-10-CM

## 2024-11-11 DIAGNOSIS — N40.1 BENIGN PROSTATIC HYPERPLASIA WITH WEAK URINARY STREAM: ICD-10-CM

## 2024-11-11 DIAGNOSIS — R73.03 PREDIABETES: ICD-10-CM

## 2024-11-11 LAB
ALBUMIN UR-MCNC: NEGATIVE MG/DL
APPEARANCE UR: CLEAR
BILIRUB UR QL STRIP: NEGATIVE
COLOR UR AUTO: YELLOW
CREAT UR-MCNC: 176 MG/DL
ERYTHROCYTE [DISTWIDTH] IN BLOOD BY AUTOMATED COUNT: 13 % (ref 10–15)
EST. AVERAGE GLUCOSE BLD GHB EST-MCNC: 105 MG/DL
GLUCOSE UR STRIP-MCNC: NEGATIVE MG/DL
HBA1C MFR BLD: 5.3 % (ref 0–5.6)
HCT VFR BLD AUTO: 43.7 % (ref 40–53)
HGB BLD-MCNC: 15.1 G/DL (ref 13.3–17.7)
HGB UR QL STRIP: NEGATIVE
KETONES UR STRIP-MCNC: NEGATIVE MG/DL
LEUKOCYTE ESTERASE UR QL STRIP: NEGATIVE
MCH RBC QN AUTO: 32.4 PG (ref 26.5–33)
MCHC RBC AUTO-ENTMCNC: 34.6 G/DL (ref 31.5–36.5)
MCV RBC AUTO: 94 FL (ref 78–100)
MICROALBUMIN UR-MCNC: <12 MG/L
MICROALBUMIN/CREAT UR: NORMAL MG/G{CREAT}
NITRATE UR QL: NEGATIVE
PH UR STRIP: 5.5 [PH] (ref 5–7)
PLATELET # BLD AUTO: 280 10E3/UL (ref 150–450)
RBC # BLD AUTO: 4.66 10E6/UL (ref 4.4–5.9)
SP GR UR STRIP: 1.02 (ref 1–1.03)
UROBILINOGEN UR STRIP-ACNC: 0.2 E.U./DL
WBC # BLD AUTO: 6.7 10E3/UL (ref 4–11)

## 2024-11-11 PROCEDURE — 84443 ASSAY THYROID STIM HORMONE: CPT

## 2024-11-11 PROCEDURE — G0103 PSA SCREENING: HCPCS

## 2024-11-11 PROCEDURE — 85027 COMPLETE CBC AUTOMATED: CPT

## 2024-11-11 PROCEDURE — 36415 COLL VENOUS BLD VENIPUNCTURE: CPT

## 2024-11-11 PROCEDURE — 80053 COMPREHEN METABOLIC PANEL: CPT

## 2024-11-11 PROCEDURE — 81003 URINALYSIS AUTO W/O SCOPE: CPT

## 2024-11-11 PROCEDURE — 82043 UR ALBUMIN QUANTITATIVE: CPT

## 2024-11-11 PROCEDURE — 82570 ASSAY OF URINE CREATININE: CPT

## 2024-11-11 PROCEDURE — 82550 ASSAY OF CK (CPK): CPT

## 2024-11-11 PROCEDURE — 80061 LIPID PANEL: CPT

## 2024-11-11 PROCEDURE — 83036 HEMOGLOBIN GLYCOSYLATED A1C: CPT

## 2024-11-12 LAB
ALBUMIN SERPL BCG-MCNC: 4.5 G/DL (ref 3.5–5.2)
ALP SERPL-CCNC: 90 U/L (ref 40–150)
ALT SERPL W P-5'-P-CCNC: 64 U/L (ref 0–70)
ANION GAP SERPL CALCULATED.3IONS-SCNC: 13 MMOL/L (ref 7–15)
AST SERPL W P-5'-P-CCNC: 34 U/L (ref 0–45)
BILIRUB SERPL-MCNC: 0.6 MG/DL
BUN SERPL-MCNC: 19.2 MG/DL (ref 8–23)
CALCIUM SERPL-MCNC: 9.8 MG/DL (ref 8.8–10.4)
CHLORIDE SERPL-SCNC: 102 MMOL/L (ref 98–107)
CHOLEST SERPL-MCNC: 192 MG/DL
CK SERPL-CCNC: 332 U/L (ref 39–308)
CREAT SERPL-MCNC: 1.15 MG/DL (ref 0.67–1.17)
EGFRCR SERPLBLD CKD-EPI 2021: 65 ML/MIN/1.73M2
FASTING STATUS PATIENT QL REPORTED: YES
FASTING STATUS PATIENT QL REPORTED: YES
GLUCOSE SERPL-MCNC: 105 MG/DL (ref 70–99)
HCO3 SERPL-SCNC: 26 MMOL/L (ref 22–29)
HDLC SERPL-MCNC: 52 MG/DL
LDLC SERPL CALC-MCNC: 103 MG/DL
NONHDLC SERPL-MCNC: 140 MG/DL
POTASSIUM SERPL-SCNC: 4 MMOL/L (ref 3.4–5.3)
PROT SERPL-MCNC: 7.5 G/DL (ref 6.4–8.3)
PSA SERPL DL<=0.01 NG/ML-MCNC: 1.2 NG/ML (ref 0–6.5)
SODIUM SERPL-SCNC: 141 MMOL/L (ref 135–145)
TRIGL SERPL-MCNC: 185 MG/DL
TSH SERPL DL<=0.005 MIU/L-ACNC: 1.87 UIU/ML (ref 0.3–4.2)

## 2024-11-17 DIAGNOSIS — R73.09 ABNORMAL GLUCOSE: Primary | ICD-10-CM

## 2024-11-17 DIAGNOSIS — R74.8 ELEVATED CK: ICD-10-CM

## 2024-12-10 LAB — HEMOCCULT STL QL IA: NEGATIVE

## 2025-03-04 ENCOUNTER — ALLIED HEALTH/NURSE VISIT (OUTPATIENT)
Dept: NURSING | Facility: CLINIC | Age: 80
End: 2025-03-04
Payer: COMMERCIAL

## 2025-03-04 ENCOUNTER — OFFICE VISIT (OUTPATIENT)
Dept: FAMILY MEDICINE | Facility: CLINIC | Age: 80
End: 2025-03-04
Payer: COMMERCIAL

## 2025-03-04 VITALS — DIASTOLIC BLOOD PRESSURE: 106 MMHG | SYSTOLIC BLOOD PRESSURE: 176 MMHG | HEART RATE: 63 BPM | OXYGEN SATURATION: 95 %

## 2025-03-04 DIAGNOSIS — E78.5 HYPERLIPIDEMIA LDL GOAL <100: ICD-10-CM

## 2025-03-04 DIAGNOSIS — M48.062 SPINAL STENOSIS OF LUMBAR REGION WITH NEUROGENIC CLAUDICATION: ICD-10-CM

## 2025-03-04 DIAGNOSIS — M43.16 SPONDYLOLISTHESIS OF LUMBAR REGION: ICD-10-CM

## 2025-03-04 DIAGNOSIS — M51.362 DEGENERATION OF INTERVERTEBRAL DISC OF LUMBAR REGION WITH DISCOGENIC BACK PAIN AND LOWER EXTREMITY PAIN: ICD-10-CM

## 2025-03-04 DIAGNOSIS — M48.061 LUMBAR FORAMINAL STENOSIS: ICD-10-CM

## 2025-03-04 DIAGNOSIS — M54.50 LUMBAR PAIN: ICD-10-CM

## 2025-03-04 DIAGNOSIS — I10 HIGH BLOOD PRESSURE: Primary | ICD-10-CM

## 2025-03-04 DIAGNOSIS — M54.16 LUMBAR BACK PAIN WITH RADICULOPATHY AFFECTING LEFT LOWER EXTREMITY: ICD-10-CM

## 2025-03-04 DIAGNOSIS — Z51.81 MEDICATION MONITORING ENCOUNTER: ICD-10-CM

## 2025-03-04 DIAGNOSIS — I10 HYPERTENSION GOAL BP (BLOOD PRESSURE) < 140/90: Primary | ICD-10-CM

## 2025-03-04 DIAGNOSIS — M47.816 LUMBAR FACET ARTHROPATHY: ICD-10-CM

## 2025-03-04 DIAGNOSIS — R73.03 PREDIABETES: ICD-10-CM

## 2025-03-04 PROCEDURE — G2211 COMPLEX E/M VISIT ADD ON: HCPCS | Performed by: FAMILY MEDICINE

## 2025-03-04 PROCEDURE — 80048 BASIC METABOLIC PNL TOTAL CA: CPT

## 2025-03-04 PROCEDURE — 99207 PR NO CHARGE NURSE ONLY: CPT

## 2025-03-04 PROCEDURE — 36415 COLL VENOUS BLD VENIPUNCTURE: CPT

## 2025-03-04 PROCEDURE — 3077F SYST BP >= 140 MM HG: CPT

## 2025-03-04 PROCEDURE — 3078F DIAST BP <80 MM HG: CPT

## 2025-03-04 PROCEDURE — 99214 OFFICE O/P EST MOD 30 MIN: CPT | Performed by: FAMILY MEDICINE

## 2025-03-04 RX ORDER — AMLODIPINE BESYLATE 5 MG/1
5 TABLET ORAL DAILY
Qty: 30 TABLET | Refills: 2 | Status: SHIPPED | OUTPATIENT
Start: 2025-03-04

## 2025-03-04 NOTE — PROGRESS NOTES
RN WALK IN FOR ELEVATED BP      0630 BP was 154/88    Denies chest pain and SOB    Denies headaches, lightheadedness, vision changes, numbness or tingling    Huddled with Dr. Saldana, reviewed BP.     Ordered amlodipine 5 mg and BMP for today's visit. Verbal from Dr. Saldana.  Reviewed red flag symptoms with patient and Dr. Saldana    Scheduled for one month follow up    Future Appointments 3/4/2025 - 8/31/2025        Date Visit Type Length Department Provider     4/7/2025 11:00 AM OFFICE VISIT 30 min [facility] Messi Saldana MD    Location Instructions:     [facility] is located at [address] [place] [address], [address] Free parking is available; access the lot by turning [address] from [address] onto [address], then west onto [facility] [address] [facility] [address].                     Alexander Alfred is being followed for Blood Pressure management.      BP Readings from Last 3 Encounters:   11/06/24 138/64   11/15/23 128/70   10/31/23 132/78   Checked multiple times within walk in visit       Wt Readings from Last 3 Encounters:   11/06/24 83 kg (182 lb 14.4 oz)   11/15/23 78.5 kg (173 lb)   10/31/23 82.1 kg (181 lb)       Is pulse 55 or greater? - Yes    Pulse Readings from Last 3 Encounters:   11/06/24 59   11/15/23 72   10/31/23 65       Current blood pressure medication(s):  Current Outpatient Medications   Medication Sig Dispense Refill    albuterol (PROAIR HFA/PROVENTIL HFA/VENTOLIN HFA) 108 (90 Base) MCG/ACT inhaler INHALE 2 PUFFS INTO THE LUNGS EVERY 6 HOURS AS NEEDED FOR SHORTNESS OF BREATH OR DIFFICULT BREATHING OR WHEEZING (Patient not taking: Reported on 11/15/2023) 54 g 0    aspirin 81 MG chewable tablet Take 1 tablet (81 mg) by mouth daily 108 tablet 3    Coenzyme Q10 (COQ-10) 100 MG CAPS Take 100 mg by mouth daily      fluticasone (FLONASE) 50 MCG/ACT nasal spray Spray 2 sprays into both nostrils daily. 48 g 3    ibuprofen (ADVIL/MOTRIN) 600 MG tablet Take 600mg of ibuprofen every 6  hours x 7 days to assist in pain control.  If you are not tolerating the medication, you are ok to stop. 28 tablet 0    ketorolac (ACULAR) 0.5 % ophthalmic solution       KRILL OIL PO Take 1 capsule by mouth daily Taking MegaRed OTC. Strength unknown.      losartan-hydrochlorothiazide (HYZAAR) 50-12.5 MG tablet Take 1 tablet by mouth daily. 90 tablet 3    moxifloxacin (VIGAMOX) 0.5 % ophthalmic solution       Multiple Vitamin (MULTIVITAMIN OR) Take 1 tablet by mouth daily       omeprazole (PRILOSEC) 20 MG DR capsule Take 1 capsule (20 mg) by mouth 2 times daily. 180 capsule 3    prednisoLONE acetate (PRED FORTE) 1 % ophthalmic suspension       tamsulosin (FLOMAX) 0.4 MG capsule Take 1 capsule (0.4 mg) by mouth daily. 90 capsule 3    vitamin D3 (CHOLECALCIFEROL) 50 mcg (2000 units) tablet Take 1 tablet (50 mcg) by mouth 2 times daily 180 tablet 0          1. Follow up instructions include:     Per provider.      SUBJECTIVE:                                                    The patient is taking medication as prescribed and is tolerating well.   Patient is monitoring Blood Pressure at home.   Last 3 home readings     154/88  Written down at home but unsure, doesn't have today    Out of the following complicating factors: Cough, Headache, Lightheadedness, Shortness of breath, Fatigue, Nausea, Sexual Dysfunction, New onset of swelling or edema, Weakness and New onset of Chest Pain, the patient reports:  None    OBJECTIVE:                                                      Today's BP completed using cuff size: regular on right side arm.      Potassium   Date Value Ref Range Status   11/11/2024 4.0 3.4 - 5.3 mmol/L Final   09/29/2022 4.0 3.4 - 5.3 mmol/L Final   09/09/2020 3.8 3.4 - 5.3 mmol/L Final     Creatinine   Date Value Ref Range Status   11/11/2024 1.15 0.67 - 1.17 mg/dL Final   09/09/2020 1.15 0.66 - 1.25 mg/dL Final     Urea Nitrogen   Date Value Ref Range Status   11/11/2024 19.2 8.0 - 23.0 mg/dL Final    09/29/2022 18 7 - 30 mg/dL Final   09/09/2020 20 7 - 30 mg/dL Final     GFR Estimate   Date Value Ref Range Status   11/11/2024 65 >60 mL/min/1.73m2 Final     Comment:     eGFR calculated using 2021 CKD-EPI equation.   09/09/2020 62 >60 mL/min/[1.73_m2] Final     Comment:     Non  GFR Calc  Starting 12/18/2018, serum creatinine based estimated GFR (eGFR) will be   calculated using the Chronic Kidney Disease Epidemiology Collaboration   (CKD-EPI) equation.           Education:  general discussion/verbal explanation  Ways to help improve BP/HTN:   Medications  Lose weight  Diet low in fat and rich in fruits, vegetables and low fat dairy products  Reduce salt in diet  Do something active for at least 30 minutes a day on most days of the week  Cut down on alcohol (if you drink more than 2 drinks per day)  Decrease stress (exercise, read, yoga, meditation, time for self, etc.)   Patient was given an opportunity to ask questions.    Patient verbalized understanding of this plan and is agreeable.    Scheduled with Dr Saldana at 3:30 3/4/25 for walk in coverage.   Fanny Daniels RN

## 2025-03-05 DIAGNOSIS — I10 HIGH BLOOD PRESSURE: ICD-10-CM

## 2025-03-05 LAB
ANION GAP SERPL CALCULATED.3IONS-SCNC: 12 MMOL/L (ref 7–15)
BUN SERPL-MCNC: 19.4 MG/DL (ref 8–23)
CALCIUM SERPL-MCNC: 9.8 MG/DL (ref 8.8–10.4)
CHLORIDE SERPL-SCNC: 105 MMOL/L (ref 98–107)
CREAT SERPL-MCNC: 1.16 MG/DL (ref 0.67–1.17)
EGFRCR SERPLBLD CKD-EPI 2021: 64 ML/MIN/1.73M2
GLUCOSE SERPL-MCNC: 102 MG/DL (ref 70–99)
HCO3 SERPL-SCNC: 26 MMOL/L (ref 22–29)
POTASSIUM SERPL-SCNC: 4.1 MMOL/L (ref 3.4–5.3)
SODIUM SERPL-SCNC: 143 MMOL/L (ref 135–145)

## 2025-03-05 NOTE — PROGRESS NOTES
Assessment & Plan     Hypertension goal BP (blood pressure) < 140/90    - PRIMARY CARE FOLLOW-UP SCHEDULING    Prediabetes    - PRIMARY CARE FOLLOW-UP SCHEDULING    Hyperlipidemia LDL goal <100    - PRIMARY CARE FOLLOW-UP SCHEDULING    Lumbar back pain with radiculopathy affecting left lower extremity    - PRIMARY CARE FOLLOW-UP SCHEDULING    Lumbar pain    - PRIMARY CARE FOLLOW-UP SCHEDULING    Degeneration of intervertebral disc of lumbar region with discogenic back pain and lower extremity pain    - PRIMARY CARE FOLLOW-UP SCHEDULING    Lumbar foraminal stenosis    - PRIMARY CARE FOLLOW-UP SCHEDULING    Spinal stenosis of lumbar region with neurogenic claudication    - PRIMARY CARE FOLLOW-UP SCHEDULING    Lumbar facet arthropathy    - PRIMARY CARE FOLLOW-UP SCHEDULING    Spondylolisthesis of lumbar region    - PRIMARY CARE FOLLOW-UP SCHEDULING    Medication monitoring encounter    - PRIMARY CARE FOLLOW-UP SCHEDULING      Regular exercise    Plan:    1) Medications: continue losartan / hydrochlorothiazide 50/12.5 daily, add amlodipine 5 mg daily    2) Labs: BMP pending    3) Immunizations: NA    4) Imaging/Diagnostics: NA    5) Consults: Spine - Dr Knight    Return in about 3 weeks (around 3/25/2025) for Medication Recheck Visit, BP Recheck, Lab Work.    24 minutes spent by myself on the date of the encounter doing chart review, history and exam, documentation and further activities per the note.    The longitudinal plan of care for the diagnosis(es)/condition(s) as documented were addressed during this visit. Due to the added complexity in care, I will continue to support Pat in the subsequent management and with ongoing continuity of care.    Shared Decision making completed.    Subjective   Rupert is a 79 year old, presenting for the following health issues:    Rupert presents for unscheduled BP check    Notes has been quite variable, he has no symptoms, triage completed, came in after work out at  [employer]    Triage     0630 BP was 154/88     Denies chest pain and SOB     Denies headaches, lightheadedness, vision changes, numbness or tingling     Huddled with Dr. Saldana, reviewed BP.      Ordered amlodipine 5 mg and BMP for today's visit. Verbal from Dr. Saldana.    Reviewed red flag symptoms with patient and Dr. Saldana    Patient Active Problem List   Diagnosis    Nasal polyps    Asthma, mild intermittent    Sleep apnea    Hyperlipidemia LDL goal <100    Hypertension goal BP (blood pressure) < 140/90    EE (eosinophilic esophagitis)    Osteoarthritis of left hip    Lumbar facet arthropathy    Lumbar degenerative disc disease    Lumbar pain    Lumbar back pain with radiculopathy affecting left lower extremity    Spondylolisthesis of lumbar region    Lumbar foraminal stenosis    Central spinal stenosis    Spinal stenosis of lumbar region at multiple levels    Acquired spondylolisthesis    Closed traumatic nondisplaced fracture of distal end of left fibula    Left ankle pain    Ankle fracture    Lumbar stenosis    Abnormal joint on examination    Allergic rhinitis    Carpal tunnel syndrome    HNP (herniated nucleus pulposus), thoracic    Memory loss    Ostium secundum type atrial septal defect    Verde's esophagus       Past Medical History:   Diagnosis Date    Asthma, mild intermittent     Park nicollet asthma    Verde's esophagus 06/2020    Dr Ramirez, EGD due 1 yr    EE (eosinophilic esophagitis) 08/2013    Dr Ramirez    Hyperlipidemia LDL goal <130     Hypertension goal BP (blood pressure) < 140/90     Lumbar degenerative disc disease     Lumbar stenosis     Dr Knight - Calf weakness/atrophy    Nasal polyps 2004    approx 2/year - increased sx from nasal polyps - prednisone 40 mg x 2 days, 20 mg x 2 days, 10mg x 10 days with excellent results    Osteoarthritis of left hip     Other chronic pain     Sensorineural hearing loss (SNHL) of both ears     Dr Hidalgo - R>L    Sleep apnea 2006    moderate - CPAP -  5-15 cm       Past Surgical History:   Procedure Laterality Date    BUNIONECTOMY LAPIDUS WITH TARSAL METATARSAL (TMT) FUSION Right 02/16/2022    Procedure: 1.  Cheilectomy, right foot. 2.  Zaina-Green first metatarsal bunionectomy, right foot. 3.  Debridement of joint, right first metatarsophalangeal joint.;  Surgeon: Chato Jeong DPM;  Location: [facility] OR    COLONOSCOPY  2010    normal - due 2020    COLONOSCOPY N/A 10/21/2020    normal, consider 10 yrs    DECOMPRESSION, FUSION LUMBAR POSTERIOR THREE + LEVELS, COMBINED N/A 10/07/2014    Dr Knight - COMBINED DECOMPRESSION, FUSION LUMBAR POSTERIOR THREE + LEVELS    ESOPHAGOSCOPY, GASTROSCOPY, DUODENOSCOPY (EGD), COMBINED  08/07/2013    Dr Ramirez - EE, non erosive gastropathy    ESOPHAGOSCOPY, GASTROSCOPY, DUODENOSCOPY (EGD), COMBINED N/A 06/02/2021    Verde's due 1 yr    ESOPHAGOSCOPY, GASTROSCOPY, DUODENOSCOPY (EGD), COMBINED N/A 07/27/2022    Procedure: ESOPHAGOGASTRODUODENOSCOPY (EGD) (fv) with biopsy by using cold forcep;  Surgeon: Teo Ramirez MD;  Location: [facility]    ESOPHAGOSCOPY, GASTROSCOPY, DUODENOSCOPY (EGD), COMBINED N/A 09/07/2023    Procedure: Esophagoscopy, gastroscopy, duodenoscopy (EGD), combined;  Surgeon: Teo Ramirez MD;  Location: [facility]    ROTATOR CUFF REPAIR RT/LT Left 2005    Lt shoulder - dr armaan lazar    ROTATOR CUFF REPAIR RT/LT Left 05/2009    Lt shoulder - dr armaan lazar    stress echo  07/2011    normal    STRESS ECHO (METRO)  06/2015    normal stress echo    SURGICAL HISTORY OF -   1982    Lt ankle CRIF    SURGICAL HISTORY OF -   04/2008    blephoplasty    SURGICAL HISTORY OF -   11/2016    T12-L1 - Decompression - Dr Knight -     SURGICAL HISTORY OF -  Right 2022    Rt CTS - [facility]    TONSILLECTOMY  1950       Current Outpatient Medications   Medication Sig Dispense Refill    albuterol (PROAIR HFA/PROVENTIL HFA/VENTOLIN HFA) 108 (90 Base) MCG/ACT inhaler INHALE 2 PUFFS INTO THE LUNGS EVERY 6 HOURS AS NEEDED FOR  SHORTNESS OF BREATH OR DIFFICULT BREATHING OR WHEEZING 54 g 0    amLODIPine (NORVASC) 5 MG tablet Take 1 tablet (5 mg) by mouth daily. 30 tablet 2    aspirin 81 MG chewable tablet Take 1 tablet (81 mg) by mouth daily 108 tablet 3    Coenzyme Q10 (COQ-10) 100 MG CAPS Take 100 mg by mouth daily      fluticasone (FLONASE) 50 MCG/ACT nasal spray Spray 2 sprays into both nostrils daily. 48 g 3    ibuprofen (ADVIL/MOTRIN) 600 MG tablet Take 600mg of ibuprofen every 6 hours x 7 days to assist in pain control.  If you are not tolerating the medication, you are ok to stop. 28 tablet 0    KRILL OIL PO Take 1 capsule by mouth daily Taking MegaRed OTC. Strength unknown.      losartan-hydrochlorothiazide (HYZAAR) 50-12.5 MG tablet Take 1 tablet by mouth daily. 90 tablet 3    Multiple Vitamin (MULTIVITAMIN OR) Take 1 tablet by mouth daily       omeprazole (PRILOSEC) 20 MG DR capsule Take 1 capsule (20 mg) by mouth 2 times daily. 180 capsule 3    tamsulosin (FLOMAX) 0.4 MG capsule Take 1 capsule (0.4 mg) by mouth daily. 90 capsule 3    vitamin D3 (CHOLECALCIFEROL) 50 mcg (2000 units) tablet Take 1 tablet (50 mcg) by mouth 2 times daily 180 tablet 0       Allergies   Allergen Reactions    Codeine GI Disturbance    Hydrocodone GI Disturbance    Oxycodone GI Disturbance       Family History   Problem Relation Age of Onset    Hypertension Father     C.A.D. Father         smoker    Other Cancer Father         life long smoker    C.A.D. Cousin         cousin - at 45    Hypertension Maternal Grandfather     C.A.D. Maternal Grandfather     Coronary Artery Disease Maternal Uncle     Coronary Artery Disease Maternal Uncle     Colon Cancer No family hx of        Social History     Socioeconomic History    Marital status:      Spouse name: Valeria    Number of children: 3    Years of education: 16   Occupational History     Employer: [employer]   Tobacco Use    Smoking status: Never    Smokeless tobacco: Never   Vaping Use    Vaping status:  Never Used   Substance and Sexual Activity    Alcohol use: Yes     Alcohol/week: 7.0 standard drinks of alcohol     Types: 7 Standard drinks or equivalent per week     Comment: 1 drinks per day avg    Drug use: No    Sexual activity: Yes     Partners: Female     Birth control/protection: Male Surgical   Other Topics Concern    Parent/sibling w/ CABG, MI or angioplasty before 65F 55M? No    Caffeine Concern Yes     Comment: 2-3 cups daily    Exercise Yes     Comment: 3+/wk, landscaping, Mtn Bike riding    Seat Belt Yes     Social Drivers of Health     Financial Resource Strain: Low Risk  (11/6/2024)    Financial Resource Strain     Within the past 12 months, have you or your family members you live with been unable to get utilities (heat, electricity) when it was really needed?: No   Food Insecurity: Low Risk  (11/6/2024)    Food Insecurity     Within the past 12 months, did you worry that your food would run out before you got money to buy more?: No     Within the past 12 months, did the food you bought just not last and you didn t have money to get more?: No   Transportation Needs: Low Risk  (11/6/2024)    Transportation Needs     Within the past 12 months, has lack of transportation kept you from medical appointments, getting your medicines, non-medical meetings or appointments, work, or from getting things that you need?: No   Physical Activity: Unknown (11/6/2024)    Exercise Vital Sign     Days of Exercise per Week: 2 days   Stress: No Stress Concern Present (11/6/2024)    [employer] - Occupational Stress Questionnaire     Feeling of Stress : Not at all   Social Connections: Unknown (11/6/2024)    Social Connection and Isolation Panel [NHANES]     Frequency of Social Gatherings with Friends and Family: Once a week   Interpersonal Safety: Low Risk  (11/6/2024)    Interpersonal Safety     Do you feel physically and emotionally safe where you currently live?: Yes     Within the past  12 months, have you been hit, slapped, kicked or otherwise physically hurt by someone?: No     Within the past 12 months, have you been humiliated or emotionally abused in other ways by your partner or ex-partner?: No   Housing Stability: Low Risk  (11/6/2024)    Housing Stability     Do you have housing? : Yes     Are you worried about losing your housing?: No     Review of Systems  CONSTITUTIONAL: NEGATIVE for fever, chills, change in weight  INTEGUMENTARY/SKIN: NEGATIVE for worrisome rashes, moles or lesions  EYES: NEGATIVE for vision changes or irritation  ENT/MOUTH: NEGATIVE for ear, mouth and throat problems  RESP: NEGATIVE for significant cough or SOB  CV: NEGATIVE for chest pain, palpitations or peripheral edema  GI: NEGATIVE for nausea, abdominal pain, heartburn, or change in bowel habits  : NEGATIVE for frequency, dysuria, or hematuria  MUSCULOSKELETAL: NEGATIVE for significant arthralgias or myalgia  NEURO: NEGATIVE for weakness, dizziness or paresthesias  ENDOCRINE: NEGATIVE for temperature intolerance, skin/hair changes  HEME: NEGATIVE for bleeding problems  PSYCHIATRIC: NEGATIVE for changes in mood or affect      Objective    There were no vitals taken for this visit.  There is no height or weight on file to calculate BMI.    Physical Exam   GENERAL: alert and no distress  EYES: Eyes grossly normal to inspection, PERRL and conjunctivae and sclerae normal  RESP: lungs clear to auscultation - no rales, rhonchi or wheezes  CV: regular rate and rhythm, normal S1 S2, no S3 or S4, no murmur, click or rub, no peripheral edema  ABDOMEN: soft, nontender, no hepatosplenomegaly, no masses and bowel sounds normal  MS: no gross musculoskeletal defects noted, no edema  SKIN: no suspicious lesions or rashes  NEURO: Normal strength and tone, mentation intact and speech normal  PSYCH: mentation appears normal, affect normal/bright    Labs reviewed / pending      Signed Electronically by:              Messi Saldana MD,  VIKASH     [facility]  [address] [place] [id number]  [email or web address]   Office: [phone]  Fax: [phone]

## 2025-03-06 RX ORDER — AMLODIPINE BESYLATE 5 MG/1
5 TABLET ORAL DAILY
Qty: 90 TABLET | OUTPATIENT
Start: 2025-03-06

## 2025-03-27 ENCOUNTER — TRANSFERRED RECORDS (OUTPATIENT)
Dept: HEALTH INFORMATION MANAGEMENT | Facility: CLINIC | Age: 80
End: 2025-03-27
Payer: COMMERCIAL

## 2025-04-07 ENCOUNTER — OFFICE VISIT (OUTPATIENT)
Dept: FAMILY MEDICINE | Facility: CLINIC | Age: 80
End: 2025-04-07
Payer: COMMERCIAL

## 2025-04-07 ENCOUNTER — ALLIED HEALTH/NURSE VISIT (OUTPATIENT)
Dept: NURSING | Facility: CLINIC | Age: 80
End: 2025-04-07
Payer: COMMERCIAL

## 2025-04-07 VITALS
DIASTOLIC BLOOD PRESSURE: 64 MMHG | HEART RATE: 80 BPM | OXYGEN SATURATION: 97 % | SYSTOLIC BLOOD PRESSURE: 134 MMHG | WEIGHT: 182 LBS | BODY MASS INDEX: 28.72 KG/M2

## 2025-04-07 DIAGNOSIS — E78.5 HYPERLIPIDEMIA LDL GOAL <100: ICD-10-CM

## 2025-04-07 DIAGNOSIS — I10 HYPERTENSION GOAL BP (BLOOD PRESSURE) < 140/90: Primary | ICD-10-CM

## 2025-04-07 DIAGNOSIS — R73.03 PREDIABETES: ICD-10-CM

## 2025-04-07 DIAGNOSIS — I10 HIGH BLOOD PRESSURE: Primary | ICD-10-CM

## 2025-04-07 DIAGNOSIS — Z51.81 MEDICATION MONITORING ENCOUNTER: ICD-10-CM

## 2025-04-07 PROCEDURE — 3078F DIAST BP <80 MM HG: CPT

## 2025-04-07 PROCEDURE — 99207 PR NO CHARGE NURSE ONLY: CPT

## 2025-04-07 PROCEDURE — 3075F SYST BP GE 130 - 139MM HG: CPT

## 2025-04-07 PROCEDURE — 99207 PR NO CHARGE LOS: CPT | Performed by: FAMILY MEDICINE

## 2025-04-07 NOTE — PROGRESS NOTES
Patient left, 11 am appt, would have seen at 11:20    BP Readings from Last 3 Encounters:   04/07/25 134/64   03/04/25 (!) 176/106   11/06/24 138/64       Answers submitted by the patient for this visit:  General Questionnaire (Submitted on 4/2/2025)  Chief Complaint: Chronic problems general questions HPI Form  What is the reason for your visit today? : BP management  How many days per week do you miss taking your medication?: 0  Questionnaire about: Chronic problems general questions HPI Form (Submitted on 4/2/2025)  Chief Complaint: Chronic problems general questions HPI Form

## 2025-04-07 NOTE — PROGRESS NOTES
Hypertension Follow-up    Do you check your blood pressure regularly outside of the clinic? Yes    Home BP readings 4/4/2025 -  136/80  4/5/2025 - 135/70  4/6/2025 - 141/82 and then an hour later 130/89     Are you following a low salt diet? Yes  Are your blood pressures ever more than 140 on the top number (systolic) OR more   than 90 on the bottom number (diastolic), for example 140/90? Yes    BP Readings from Last 2 Encounters:   04/07/25 134/64   03/04/25 (!) 176/106     How many servings of fruits and vegetables do you eat daily?  2-3  On average, how many sweetened beverages do you drink each day (Examples: soda, juice, sweet tea, etc.  Do NOT count diet or artificially sweetened beverages)?   0  How many days per week do you exercise enough to make your heart beat faster? 3 or less  How many minutes a day do you exercise enough to make your heart beat faster? 30 - 60  How many days per week do you miss taking your medication? 0     Creatinine   Date Value Ref Range Status   03/04/2025 1.16 0.67 - 1.17 mg/dL Final   09/09/2020 1.15 0.66 - 1.25 mg/dL Final      Sodium   Date Value Ref Range Status   03/04/2025 143 135 - 145 mmol/L Final   09/09/2020 138 133 - 144 mmol/L Final     Current Outpatient Medications   Medication Sig Dispense Refill    albuterol (PROAIR HFA/PROVENTIL HFA/VENTOLIN HFA) 108 (90 Base) MCG/ACT inhaler INHALE 2 PUFFS INTO THE LUNGS EVERY 6 HOURS AS NEEDED FOR SHORTNESS OF BREATH OR DIFFICULT BREATHING OR WHEEZING 54 g 0    amLODIPine (NORVASC) 5 MG tablet Take 1 tablet (5 mg) by mouth daily. 30 tablet 2    aspirin 81 MG chewable tablet Take 1 tablet (81 mg) by mouth daily 108 tablet 3    Coenzyme Q10 (COQ-10) 100 MG CAPS Take 100 mg by mouth daily      fluticasone (FLONASE) 50 MCG/ACT nasal spray Spray 2 sprays into both nostrils daily. 48 g 3    ibuprofen (ADVIL/MOTRIN) 600 MG tablet Take 600mg of ibuprofen every 6 hours x 7 days to assist in pain control.  If you are not tolerating the  medication, you are ok to stop. 28 tablet 0    KRILL OIL PO Take 1 capsule by mouth daily Taking MegaRed OTC. Strength unknown.      losartan-hydrochlorothiazide (HYZAAR) 50-12.5 MG tablet Take 1 tablet by mouth daily. 90 tablet 3    Multiple Vitamin (MULTIVITAMIN OR) Take 1 tablet by mouth daily       omeprazole (PRILOSEC) 20 MG DR capsule Take 1 capsule (20 mg) by mouth 2 times daily. 180 capsule 3    tamsulosin (FLOMAX) 0.4 MG capsule Take 1 capsule (0.4 mg) by mouth daily. 90 capsule 3    vitamin D3 (CHOLECALCIFEROL) 50 mcg (2000 units) tablet Take 1 tablet (50 mcg) by mouth 2 times daily 180 tablet 0     No current facility-administered medications for this visit.       Denies: CP, SOB, headaches, blurred vision, N/V, numbness or tingling.       Dianna Geronimo RN, BSN  [facility]

## 2025-04-14 ENCOUNTER — MYC MEDICAL ADVICE (OUTPATIENT)
Dept: FAMILY MEDICINE | Facility: CLINIC | Age: 80
End: 2025-04-14
Payer: COMMERCIAL

## (undated) DEVICE — ENDO FORCEP ENDOJAW BIOPSY 2.8MMX160CM FB-220K

## (undated) DEVICE — SU ETHILON 4-0 PS-2 18" BLACK 1667H

## (undated) DEVICE — BLADE KNIFE SURG 15 371115

## (undated) DEVICE — SUCTION CANISTER MEDIVAC LINER 3000ML W/LID 65651-530

## (undated) DEVICE — GLOVE PROTEXIS POWDER FREE 7.5 ORTHOPEDIC 2D73ET75

## (undated) DEVICE — Device

## (undated) DEVICE — KIT ENDO TURNOVER/PROCEDURE W/CLEAN A SCOPE LINERS 103888

## (undated) DEVICE — SU VICRYL 3-0 PS-2 18" UND J497H

## (undated) DEVICE — PREP POVIDONE-IODINE 7.5% SCRUB 4OZ BOTTLE MDS093945

## (undated) DEVICE — LINEN FULL SHEET 5511

## (undated) DEVICE — SU VICRYL 4-0 PS-2 18" UND J496H

## (undated) DEVICE — CAST PADDING 4" STERILE 9044S

## (undated) DEVICE — DRAPE C-ARM MINI 5423

## (undated) DEVICE — NDL 25GA 1.5" 305127

## (undated) DEVICE — ENDO BITE BLOCK ADULT OLYMPUS LATEX FREE MAJ-1632

## (undated) DEVICE — DRSG KERLIX FLUFFS X5

## (undated) DEVICE — CAST PADDING 4" UNSTERILE 9044

## (undated) DEVICE — BAG CLEAR TRASH 1.3M 39X33" P4040C

## (undated) DEVICE — PACK LOWER EXTREMITY RIDGES

## (undated) DEVICE — PAD CHUX UNDERPAD 23X24" 7136

## (undated) DEVICE — BLADE SAW SAGITTAL 25.5X9.5X.4MM FINE LINVATEC 5023-138

## (undated) DEVICE — PREP SKIN SCRUB TRAY 4461A

## (undated) DEVICE — LINEN HALF SHEET 5512

## (undated) DEVICE — LINEN TOWEL PACK X10 5473

## (undated) DEVICE — SU VICRYL 3-0 TIE 12X18" J904T

## (undated) DEVICE — NDL ANGIOCATH 14GA 1.25" 3068

## (undated) DEVICE — PREP POVIDONE IODINE SOLUTION 10% 4OZ BOTTLE 29906-004

## (undated) DEVICE — SYR 10ML FINGER CONTROL W/O NDL 309695

## (undated) DEVICE — LINEN TOWEL PACK X5 5464

## (undated) DEVICE — ESU GROUND PAD ADULT W/CORD E7507

## (undated) DEVICE — BLADE CLIPPER 3M 9670

## (undated) DEVICE — BNDG ELASTIC 4"X5YDS UNSTERILE 6611-40

## (undated) DEVICE — GOWN IMPERVIOUS SPECIALTY XLG/XLONG 32474

## (undated) RX ORDER — PROPOFOL 10 MG/ML
INJECTION, EMULSION INTRAVENOUS
Status: DISPENSED
Start: 2022-02-16

## (undated) RX ORDER — CEFAZOLIN SODIUM/WATER 2 G/20 ML
SYRINGE (ML) INTRAVENOUS
Status: DISPENSED
Start: 2022-02-16

## (undated) RX ORDER — LIDOCAINE HYDROCHLORIDE 10 MG/ML
INJECTION, SOLUTION EPIDURAL; INFILTRATION; INTRACAUDAL; PERINEURAL
Status: DISPENSED
Start: 2022-02-16

## (undated) RX ORDER — ONDANSETRON 2 MG/ML
INJECTION INTRAMUSCULAR; INTRAVENOUS
Status: DISPENSED
Start: 2022-02-16

## (undated) RX ORDER — BUPIVACAINE HYDROCHLORIDE 2.5 MG/ML
INJECTION, SOLUTION EPIDURAL; INFILTRATION; INTRACAUDAL
Status: DISPENSED
Start: 2022-02-16

## (undated) RX ORDER — DEXAMETHASONE SODIUM PHOSPHATE 4 MG/ML
INJECTION, SOLUTION INTRA-ARTICULAR; INTRALESIONAL; INTRAMUSCULAR; INTRAVENOUS; SOFT TISSUE
Status: DISPENSED
Start: 2022-02-16

## (undated) RX ORDER — FENTANYL CITRATE 50 UG/ML
INJECTION, SOLUTION INTRAMUSCULAR; INTRAVENOUS
Status: DISPENSED
Start: 2021-06-02

## (undated) RX ORDER — FENTANYL CITRATE 50 UG/ML
INJECTION, SOLUTION INTRAMUSCULAR; INTRAVENOUS
Status: DISPENSED
Start: 2022-02-16

## (undated) RX ORDER — FENTANYL CITRATE 50 UG/ML
INJECTION, SOLUTION INTRAMUSCULAR; INTRAVENOUS
Status: DISPENSED
Start: 2020-10-21

## (undated) RX ORDER — FENTANYL CITRATE 50 UG/ML
INJECTION, SOLUTION INTRAMUSCULAR; INTRAVENOUS
Status: DISPENSED
Start: 2023-09-07

## (undated) RX ORDER — FENTANYL CITRATE 0.05 MG/ML
INJECTION, SOLUTION INTRAMUSCULAR; INTRAVENOUS
Status: DISPENSED
Start: 2022-07-27

## (undated) RX ORDER — GLYCOPYRROLATE 0.2 MG/ML
INJECTION INTRAMUSCULAR; INTRAVENOUS
Status: DISPENSED
Start: 2022-02-16